# Patient Record
Sex: FEMALE | Race: WHITE | NOT HISPANIC OR LATINO | Employment: OTHER | URBAN - METROPOLITAN AREA
[De-identification: names, ages, dates, MRNs, and addresses within clinical notes are randomized per-mention and may not be internally consistent; named-entity substitution may affect disease eponyms.]

---

## 2023-10-17 ENCOUNTER — TELEPHONE (OUTPATIENT)
Dept: HEMATOLOGY/ONCOLOGY | Facility: CLINIC | Age: 63
End: 2023-10-17
Payer: COMMERCIAL

## 2023-10-17 DIAGNOSIS — K21.9 GASTROESOPHAGEAL REFLUX DISEASE WITHOUT ESOPHAGITIS: Primary | ICD-10-CM

## 2023-10-17 PROBLEM — Z85.3 PERSONAL HISTORY OF BREAST CANCER: Status: ACTIVE | Noted: 2023-10-17

## 2023-10-17 RX ORDER — FAMOTIDINE 20 MG/1
20 TABLET, FILM COATED ORAL 2 TIMES DAILY
Qty: 60 TABLET | Refills: 11 | Status: SHIPPED | OUTPATIENT
Start: 2023-10-17 | End: 2024-10-16

## 2023-10-25 DIAGNOSIS — G47.00 INSOMNIA, UNSPECIFIED TYPE: Primary | ICD-10-CM

## 2023-10-25 RX ORDER — TRAZODONE HYDROCHLORIDE 100 MG/1
100 TABLET ORAL NIGHTLY
Qty: 30 TABLET | Refills: 0 | Status: SHIPPED | OUTPATIENT
Start: 2023-10-25 | End: 2023-11-27

## 2023-11-26 DIAGNOSIS — G47.00 INSOMNIA, UNSPECIFIED TYPE: ICD-10-CM

## 2023-11-27 RX ORDER — TRAZODONE HYDROCHLORIDE 100 MG/1
100 TABLET ORAL NIGHTLY
Qty: 30 TABLET | Refills: 0 | Status: SHIPPED | OUTPATIENT
Start: 2023-11-27 | End: 2024-01-03

## 2023-12-07 ENCOUNTER — HOSPITAL ENCOUNTER (EMERGENCY)
Facility: HOSPITAL | Age: 63
Discharge: HOME | End: 2023-12-07
Attending: STUDENT IN AN ORGANIZED HEALTH CARE EDUCATION/TRAINING PROGRAM
Payer: COMMERCIAL

## 2023-12-07 ENCOUNTER — APPOINTMENT (OUTPATIENT)
Dept: RADIOLOGY | Facility: HOSPITAL | Age: 63
End: 2023-12-07
Payer: COMMERCIAL

## 2023-12-07 VITALS
BODY MASS INDEX: 28.71 KG/M2 | WEIGHT: 156 LBS | HEIGHT: 62 IN | DIASTOLIC BLOOD PRESSURE: 78 MMHG | HEART RATE: 85 BPM | SYSTOLIC BLOOD PRESSURE: 148 MMHG | TEMPERATURE: 98.1 F | RESPIRATION RATE: 18 BRPM | OXYGEN SATURATION: 95 %

## 2023-12-07 DIAGNOSIS — N30.00 ACUTE CYSTITIS WITHOUT HEMATURIA: ICD-10-CM

## 2023-12-07 DIAGNOSIS — R53.83 OTHER FATIGUE: Primary | ICD-10-CM

## 2023-12-07 PROBLEM — Z86.2 PERSONAL HISTORY OF DISEASES OF THE BLOOD AND BLOOD-FORMING ORGANS AND CERTAIN DISORDERS INVOLVING THE IMMUNE MECHANISM: Status: ACTIVE | Noted: 2023-12-07

## 2023-12-07 PROBLEM — E53.8 DEFICIENCY OF OTHER SPECIFIED B GROUP VITAMINS: Status: ACTIVE | Noted: 2023-12-07

## 2023-12-07 PROBLEM — Z86.73 PERSONAL HISTORY OF TRANSIENT ISCHEMIC ATTACK (TIA), AND CEREBRAL INFARCTION WITHOUT RESIDUAL DEFICITS: Status: ACTIVE | Noted: 2023-12-07

## 2023-12-07 PROBLEM — Z86.69 PERSONAL HISTORY OF OTHER DISEASES OF THE NERVOUS SYSTEM AND SENSE ORGANS: Status: ACTIVE | Noted: 2023-12-07

## 2023-12-07 PROBLEM — Z86.79 PERSONAL HISTORY OF OTHER DISEASES OF THE CIRCULATORY SYSTEM: Status: ACTIVE | Noted: 2023-12-07

## 2023-12-07 PROBLEM — Z86.39 PERSONAL HISTORY OF OTHER ENDOCRINE, NUTRITIONAL AND METABOLIC DISEASE: Status: ACTIVE | Noted: 2023-12-07

## 2023-12-07 LAB
ALBUMIN SERPL-MCNC: 3.5 G/DL (ref 3.5–5)
ALP BLD-CCNC: 133 U/L (ref 35–125)
ALT SERPL-CCNC: 19 U/L (ref 5–40)
ANION GAP SERPL CALC-SCNC: 11 MMOL/L
APPEARANCE UR: ABNORMAL
AST SERPL-CCNC: 34 U/L (ref 5–40)
BASOPHILS # BLD AUTO: 0.02 X10*3/UL (ref 0–0.1)
BASOPHILS NFR BLD AUTO: 0.3 %
BILIRUB SERPL-MCNC: 0.5 MG/DL (ref 0.1–1.2)
BILIRUB UR STRIP.AUTO-MCNC: NEGATIVE MG/DL
BUN SERPL-MCNC: 6 MG/DL (ref 8–25)
CALCIUM SERPL-MCNC: 9 MG/DL (ref 8.5–10.4)
CHLORIDE SERPL-SCNC: 95 MMOL/L (ref 97–107)
CO2 SERPL-SCNC: 32 MMOL/L (ref 24–31)
COLOR UR: YELLOW
CREAT SERPL-MCNC: 0.7 MG/DL (ref 0.4–1.6)
EOSINOPHIL # BLD AUTO: 0.09 X10*3/UL (ref 0–0.7)
EOSINOPHIL NFR BLD AUTO: 1.1 %
ERYTHROCYTE [DISTWIDTH] IN BLOOD BY AUTOMATED COUNT: 14.9 % (ref 11.5–14.5)
GFR SERPL CREATININE-BSD FRML MDRD: >90 ML/MIN/1.73M*2
GLUCOSE SERPL-MCNC: 101 MG/DL (ref 65–99)
GLUCOSE UR STRIP.AUTO-MCNC: NORMAL MG/DL
HCT VFR BLD AUTO: 46.6 % (ref 36–46)
HGB BLD-MCNC: 15.3 G/DL (ref 12–16)
HOLD SPECIMEN: NORMAL
HYALINE CASTS #/AREA URNS AUTO: ABNORMAL /LPF
IMM GRANULOCYTES # BLD AUTO: 0.04 X10*3/UL (ref 0–0.7)
IMM GRANULOCYTES NFR BLD AUTO: 0.5 % (ref 0–0.9)
KETONES UR STRIP.AUTO-MCNC: NEGATIVE MG/DL
LEUKOCYTE ESTERASE UR QL STRIP.AUTO: ABNORMAL
LYMPHOCYTES # BLD AUTO: 1.58 X10*3/UL (ref 1.2–4.8)
LYMPHOCYTES NFR BLD AUTO: 19.8 %
MAGNESIUM SERPL-MCNC: 1.9 MG/DL (ref 1.6–3.1)
MCH RBC QN AUTO: 35.5 PG (ref 26–34)
MCHC RBC AUTO-ENTMCNC: 32.8 G/DL (ref 32–36)
MCV RBC AUTO: 108 FL (ref 80–100)
MONOCYTES # BLD AUTO: 0.67 X10*3/UL (ref 0.1–1)
MONOCYTES NFR BLD AUTO: 8.4 %
MUCOUS THREADS #/AREA URNS AUTO: ABNORMAL /LPF
NEUTROPHILS # BLD AUTO: 5.57 X10*3/UL (ref 1.2–7.7)
NEUTROPHILS NFR BLD AUTO: 69.9 %
NITRITE UR QL STRIP.AUTO: NEGATIVE
NRBC BLD-RTO: 0 /100 WBCS (ref 0–0)
PH UR STRIP.AUTO: 8 [PH]
PLATELET # BLD AUTO: 170 X10*3/UL (ref 150–450)
POTASSIUM SERPL-SCNC: 4.2 MMOL/L (ref 3.4–5.1)
PROT SERPL-MCNC: 6.5 G/DL (ref 5.9–7.9)
PROT UR STRIP.AUTO-MCNC: ABNORMAL MG/DL
RBC # BLD AUTO: 4.31 X10*6/UL (ref 4–5.2)
RBC # UR STRIP.AUTO: NEGATIVE /UL
RBC #/AREA URNS AUTO: ABNORMAL /HPF
SODIUM SERPL-SCNC: 138 MMOL/L (ref 133–145)
SP GR UR STRIP.AUTO: 1.02
SQUAMOUS #/AREA URNS AUTO: ABNORMAL /HPF
UROBILINOGEN UR STRIP.AUTO-MCNC: ABNORMAL MG/DL
WBC # BLD AUTO: 8 X10*3/UL (ref 4.4–11.3)
WBC #/AREA URNS AUTO: ABNORMAL /HPF

## 2023-12-07 PROCEDURE — 83735 ASSAY OF MAGNESIUM: CPT

## 2023-12-07 PROCEDURE — 96361 HYDRATE IV INFUSION ADD-ON: CPT

## 2023-12-07 PROCEDURE — 96360 HYDRATION IV INFUSION INIT: CPT

## 2023-12-07 PROCEDURE — 2500000004 HC RX 250 GENERAL PHARMACY W/ HCPCS (ALT 636 FOR OP/ED)

## 2023-12-07 PROCEDURE — 70450 CT HEAD/BRAIN W/O DYE: CPT

## 2023-12-07 PROCEDURE — 84155 ASSAY OF PROTEIN SERUM: CPT

## 2023-12-07 PROCEDURE — 87086 URINE CULTURE/COLONY COUNT: CPT | Mod: WESLAB

## 2023-12-07 PROCEDURE — 81001 URINALYSIS AUTO W/SCOPE: CPT

## 2023-12-07 PROCEDURE — 99284 EMERGENCY DEPT VISIT MOD MDM: CPT | Performed by: STUDENT IN AN ORGANIZED HEALTH CARE EDUCATION/TRAINING PROGRAM

## 2023-12-07 PROCEDURE — 36415 COLL VENOUS BLD VENIPUNCTURE: CPT

## 2023-12-07 PROCEDURE — 71046 X-RAY EXAM CHEST 2 VIEWS: CPT

## 2023-12-07 PROCEDURE — 85025 COMPLETE CBC W/AUTO DIFF WBC: CPT

## 2023-12-07 RX ORDER — NITROFURANTOIN 25; 75 MG/1; MG/1
100 CAPSULE ORAL 2 TIMES DAILY
Qty: 10 CAPSULE | Refills: 0 | Status: SHIPPED | OUTPATIENT
Start: 2023-12-07 | End: 2023-12-12

## 2023-12-07 RX ADMIN — SODIUM CHLORIDE 500 ML: 9 INJECTION, SOLUTION INTRAVENOUS at 19:45

## 2023-12-07 ASSESSMENT — LIFESTYLE VARIABLES
HAVE YOU EVER FELT YOU SHOULD CUT DOWN ON YOUR DRINKING: NO
EVER FELT BAD OR GUILTY ABOUT YOUR DRINKING: NO
EVER HAD A DRINK FIRST THING IN THE MORNING TO STEADY YOUR NERVES TO GET RID OF A HANGOVER: NO
REASON UNABLE TO ASSESS: NO
HAVE PEOPLE ANNOYED YOU BY CRITICIZING YOUR DRINKING: NO

## 2023-12-07 ASSESSMENT — PAIN - FUNCTIONAL ASSESSMENT: PAIN_FUNCTIONAL_ASSESSMENT: 0-10

## 2023-12-07 ASSESSMENT — COLUMBIA-SUICIDE SEVERITY RATING SCALE - C-SSRS
6. HAVE YOU EVER DONE ANYTHING, STARTED TO DO ANYTHING, OR PREPARED TO DO ANYTHING TO END YOUR LIFE?: NO
2. HAVE YOU ACTUALLY HAD ANY THOUGHTS OF KILLING YOURSELF?: NO
1. IN THE PAST MONTH, HAVE YOU WISHED YOU WERE DEAD OR WISHED YOU COULD GO TO SLEEP AND NOT WAKE UP?: NO

## 2023-12-07 ASSESSMENT — PAIN SCALES - GENERAL
PAINLEVEL_OUTOF10: 0 - NO PAIN
PAINLEVEL_OUTOF10: 2

## 2023-12-07 ASSESSMENT — PAIN DESCRIPTION - PROGRESSION: CLINICAL_PROGRESSION: NOT CHANGED

## 2023-12-07 NOTE — ED TRIAGE NOTES
Pt complains of left sided weakness for about a few weeks. Pt has a hx of a stroke with left sided weakness. Pt states she has been feeling more weak lately and has not been able to do things as she normally does.

## 2023-12-08 NOTE — DISCHARGE INSTRUCTIONS
Please follow-up closely with your primary care provider in the following 1 to 2 days.    Take oral antibiotic as prescribed and complete entire course.    Continue Tylenol and ibuprofen as needed for aches and pains.

## 2023-12-08 NOTE — ED PROVIDER NOTES
HPI   Chief Complaint   Patient presents with   • Extremity Weakness       Patient is a 63-year-old female presents emergency department for evaluation of weakness.  Patient states that over the last several weeks she has been having increasing weakness.  She states that she has baseline left-sided deficits that wax and wane since her stroke 2 years ago.  She states some days are better than others, but she denies any difficulty speaking.  She states that she has been able to get up and walk around over the last 3 to 4 weeks without difficulty, but feels that she just feels weak.  She states that some days she feels very numb and other days she feels fine.  She has no specific complaints at this time.  She states that at home she does utilize a walker to get around and does have help at home with her  and brothers who live with her.  She does note history of breast cancer with double mastectomy and chemotherapy in remission over the last year. She denies any chest pain, lightheadedness, dizziness, nausea, vomiting, abdominal pain, shortness of breath, headache, vision changes, difficulty speaking.  She does note that she has a rash on her left foot that has been there for several months which is not painful or itchy.       History provided by:  Patient   used: No                        No data recorded                Patient History   Past Medical History:   Diagnosis Date   • Deficiency of other specified B group vitamins     Folate deficiency   • Personal history of diseases of the blood and blood-forming organs and certain disorders involving the immune mechanism     History of macrocytosis   • Personal history of other diseases of the circulatory system     History of hypertension   • Personal history of other diseases of the nervous system and sense organs     History of peripheral neuropathy   • Personal history of other endocrine, nutritional and metabolic disease     History of  hypokalemia   • Personal history of transient ischemic attack (TIA), and cerebral infarction without residual deficits     History of stroke     Past Surgical History:   Procedure Laterality Date   • BI STEREOTACTIC GUIDED BREAST LEFT LOCALIZATION AND BIOPSY Left 3/28/2022    BI STEREOTACTIC GUIDED BREAST LOCALIZATION AND BIOPSY LEFT [facility]   • BI US GUIDED BREAST LOCALIZATION AND BIOPSY RIGHT Right 3/28/2022    BI US GUIDED BREAST LOCALIZATION AND BIOPSY RIGHT [facility]   • BI US GUIDED BREAST LOCALIZATION RIGHT Right 9/26/2022    BI US GUIDED BREAST LOCALIZATION RIGHT [facility] INPATIENT LEGAC[facility]   • MR HEAD ANGIO WO IV CONTRAST  10/26/2021    MR HEAD ANGIO WO IV CONTRAST [facility] EMERGENCY [facility]GAC[facility]   • OTHER SURGICAL HISTORY  01/04/2022    Cataract surgery   • OTHER SURGICAL HISTORY  01/04/2022    Hysterectomy   • US GUIDED BIOPSY LYMPH NODE SUPERFICIAL  3/28/2022    US GUIDED BIOPSY LYMPH NODE SUPERFICIAL [facility] CL[facility]ICAL [facility]GAC[facility]     No family history on file.  Social History     Tobacco Use   • Smoking status: Not on file   • Smokeless tobacco: Not on file   Substance Use Topics   • Alcohol use: Not on file   • Drug use: Not on file       Physical Exam   ED Triage Vitals [12/07/23 1824]   Temp Heart Rate Resp BP   36.7 °C (98.1 °F) 85 18 148/78      SpO2 Temp src Heart Rate Source Patient Position   95 % -- -- --      BP Location FiO2 (%)     -- --       Physical Exam  Constitutional:       Appearance: Normal appearance.   HENT:      Head: Normocephalic and atraumatic.   Eyes:      Extraocular Movements: Extraocular movements intact.      Pupils: Pupils are equal, round, and reactive to light.   Cardiovascular:      Rate and Rhythm: Normal rate and regular rhythm.   Pulmonary:      Effort: Pulmonary effort is normal.      Breath sounds: Normal breath sounds.   Abdominal:      General: Abdomen is flat.      Palpations: Abdomen is soft.      Tenderness: There is no abdominal tenderness.   Musculoskeletal:          General: Normal range of motion.   Skin:     General: Skin is warm and dry.   Neurological:      General: No focal deficit present.      Mental Status: She is alert and oriented to person, place, and time.      Comments: NIHSS 0.   Psychiatric:         Mood and Affect: Mood normal.         Behavior: Behavior normal.         ED Course & MDM   Diagnoses as of 12/07/23 2231   Other fatigue   Acute cystitis without hematuria       Medical Decision Making  Patient is a 63-year-old female presents emergency department for evaluation of generalized weakness.    EKG was interpreted by attending physician.    Lab work done today included CMP, CBC, magnesium, urinalysis.  Lab work with mild electrolyte disturbances and otherwise unremarkable.  Urine shows some leukocyte esterase and white cells.    Scans done today were interpreted/confirmed by radiologist and also interpreted by me which included CT brain without contrast and chest x-ray.  CT brain shows no acute abnormality of the brain with sinus disease as described.  Chest x-ray shows no acute cardiopulmonary disease.    Medications given at today's visit include IV fluids     I saw this patient in conjunction with Dr. Rubio.  There was initial concern that patient was mildly hypoxic at 89% on room air and was placed on oxygen by nursing staff 2 L.  On evaluation of patient I removed oxygen and was able to observe oxygen saturations for long periods of times which maintain 96 to 97% even while conversing.  She was able to ambulate without difficulty and was not short of breath at any point.  She had no chest pain and otherwise unremarkable workup.  Given that she was able to ambulate with no acute abnormalities noted on imaging today or lab work patient is stable for discharge follow-up closely outpatient with primary care provider.  She has no acute neurologic deficits and no focal deficits concerning for acute or subacute stroke at this time.  Unclear etiology  of patient's generalized weakness, but likely related to gradual general decline rather than acute pathology.  She does have some leukocyte esterase in urine with white blood cells likely contamination, but given weakness and elderly we will treat with Macrobid for possible developing urinary tract infection.  Given that she is able to ambulate she will be discharged to follow close with primary care provider for referral for physical therapy and continued management of her symptoms at home.  She was given close return precautions should she develop any new or worsening symptoms.  Emergent pathologies were considered for this patient, although I have low suspicion for anything acutely emergent given patient's clinical presentation, history, physical exam, stable vital signs, and relatively unremarkable workup.  Discharging patient home is reasonable plan of care for outpatient management.    All labs, imaging, and diagnostic studies were reviewed by me and patient was counseled on clinical impression, expectations, and plan.  Patient was educated to follow-up with PCP in the following 1-2 days.  All questions from patient were answered. They elicited understanding and were agreeable to course of treatment.  Patient was discharged in stable condition and given strict return precautions.    ** Disclaimer:  Parts of this document were written utilizing a voice to text dictation software.  Note may contain minor transcription or typographical errors that were inadvertently transcribed by the computer software.        Procedure  Procedures     Gina Leonard PA-C  12/07/23 5603

## 2023-12-09 LAB — BACTERIA UR CULT: NORMAL

## 2023-12-11 ENCOUNTER — TELEPHONE (OUTPATIENT)
Dept: PRIMARY CARE | Facility: CLINIC | Age: 63
End: 2023-12-11
Payer: COMMERCIAL

## 2023-12-25 NOTE — PROGRESS NOTES
This is a 63 year old female for ER FU visit for WEAKNESS and HYPOXIA and while CXR in ER WNL there is a Hx of May 2023 ABN on CT CHEST in [facility] and needs 3 month FU CT as copied below:      IMPRESSION:  1. Several 2-3 mm nodular foci along the anti dependent, anterior aspect of  the upper trachea. These could indicate adherent secretions, but  endobronchial nodules not excluded. Therefore, follow-up chest CT in 3  months is recommended with instruction for vigorous expectoration prior to  the scan, to clear any secretions.     2. Additional new 2-3 mm pulmonary nodules are identified, as detailed  above. Follow-up chest CT in 12 months will also be necessary.     3. Incidentally noted skin thickening in the region of the left breast,  similarly to the most recent CT. Please correlate with physical examination.     RECOMMENDATIONS: Follow-up low-dose chest CT in 3 months with instruction  for vigorous expectoration prior to the scan, to clear any secretions and  evaluate for endobronchial nodules.     Category: Lung-RADS Category 4A --  Suspicious findings for which additional  diagnostic testing is recommended.  3 month LDCT recommended or PET/CT may  be used if nodule has >= 8mm solid component.    __________________________________________________________________________________________________________    COPY of ER notes:    I saw this patient in conjunction with Dr. Rubio.  There was initial concern that patient was mildly hypoxic at 89% on room air and was placed on oxygen by nursing staff 2 L.  On evaluation of patient I removed oxygen and was able to observe oxygen saturations for long periods of times which maintain 96 to 97% even while conversing.  She was able to ambulate without difficulty and was not short of breath at any point.  She had no chest pain and otherwise unremarkable workup.  Given that she was able to ambulate with no acute abnormalities noted on imaging today or lab work patient  is stable for discharge follow-up closely outpatient with primary care provider.  She has no acute neurologic deficits and no focal deficits concerning for acute or subacute stroke at this time.  Unclear etiology of patient's generalized weakness, but likely related to gradual general decline rather than acute pathology.  She does have some leukocyte esterase in urine with white blood cells likely contamination, but given weakness and elderly we will treat with Macrobid for possible developing urinary tract infection.  Given that she is able to ambulate she will be discharged to follow close with primary care provider for referral for physical therapy and continued management of her symptoms at home.  She was given close return precautions should she develop any new or worsening symptoms.  Emergent pathologies were considered for this patient, although I have low suspicion for anything acutely emergent given patient's clinical presentation, history, physical exam, stable vital signs, and relatively unremarkable workup.  Discharging patient home is reasonable plan of care for outpatient management.     All labs, imaging, and diagnostic studies were reviewed by me and patient was counseled on clinical impression, expectations, and plan.  Patient was educated to follow-up with PCP in the following 1-2 days.  All questions from patient were answered. They elicited understanding and were agreeable to course of treatment.  Patient was discharged in stable condition and given strict return precautions.    ROS is NEG for HEADACHE, NAUSEA, VOMITING, DIARRHEA, CHEST PAIN, SOB, and BLEEDING and as further REVIEWED BELOW.      Subjective   Julia Carrion is a 63 y.o. female who presents for ER FU VISIT  .    HPI:        Review of systems is essentially negative for all systems except for any identified issues in HPI above.    Objective     There were no vitals taken for this visit.     Physical Examination:       GENERAL            General Appearance: well-appearing, well-developed, well-hydrated, well-nourished, no acute distress.        HEENT           NECK supple, no masses or thyromegaly, no carotid bruit.        EYES           Extraocular Movements: normal, bilateral eyes TRISH, no conjunctival injection.        HEART           Rate and Rhythm regular rate and rhythm. Heart sounds: normal S1S2, no S3 or S4. Murmurs: none.        CHEST           Breath sounds: Clear to IPPA, RR<16 no use of accessory muscles.        ABDOMEN           General: Neg for LKKS or masses, no scleral icterus or jaundice.        MUSCULOSKELETAL           Joints Demonstration: Neg for erythema, swelling or joint deformities. gross abnormalities no gross abnormalities.        EXTREMITIES           Lower Extremities: Neg for cyanosis, clubbing or edema.       Assessment/Plan   Problem List Items Addressed This Visit    None      FOLLOW UP:  PRN and as specified above         Carolann Abrams M.D.

## 2024-01-02 DIAGNOSIS — G47.00 INSOMNIA, UNSPECIFIED TYPE: ICD-10-CM

## 2024-01-03 RX ORDER — TRAZODONE HYDROCHLORIDE 100 MG/1
100 TABLET ORAL NIGHTLY
Qty: 30 TABLET | Refills: 0 | Status: SHIPPED | OUTPATIENT
Start: 2024-01-03 | End: 2024-02-02

## 2024-01-04 ENCOUNTER — APPOINTMENT (OUTPATIENT)
Dept: PRIMARY CARE | Facility: CLINIC | Age: 64
End: 2024-01-04
Payer: COMMERCIAL

## 2024-01-09 DIAGNOSIS — Z76.0 PRESCRIPTION REFILL: ICD-10-CM

## 2024-01-09 PROBLEM — C50.911 MALIGNANT NEOPLASM OF RIGHT BREAST IN FEMALE, ESTROGEN RECEPTOR POSITIVE (MULTI): Status: ACTIVE | Noted: 2024-01-09

## 2024-01-09 PROBLEM — Z17.0 MALIGNANT NEOPLASM OF RIGHT BREAST IN FEMALE, ESTROGEN RECEPTOR POSITIVE (MULTI): Status: ACTIVE | Noted: 2024-01-09

## 2024-01-09 NOTE — PROGRESS NOTES
"Patient ID: Julia Carrion is a 63 y.o. female.    The patient presents to clinic today for her history of breast cancer.     Cancer Staging   Malignant neoplasm of right breast in female, estrogen receptor positive (CMS/HCC)  Staging form: Breast, AJCC 8th Edition  - Pathologic stage from 10/17/2022: No Stage Recommended (ypT2, pN2, cM0) - Unsigned        Diagnostic/Therapeutic History:  She felt a change in her right breast for at least 1 year.   BILATERAL BREAST CANCER     RIGHT  ER DE positive and HER2-negative.  Low genomic risk but high clinical risk.     MAMMAPRINT SUMMARY OF RESULTS:  LOW RISK LUMINAL-TYPE (A)                                 MAMMAPRINT INDEX:  +0.092                                                                                        Probability of pCR: 2%                                                                                                        Some delay in care related to CVA 10/2021.  Changes in the skin noted and at 2 areas of disease noted.  Clinically stage T3 N1.     Left Breast - DCIS      S/P 9/26/22 bilateral mastectomy--- yT4b N2 s/p 4 months of  neoadjuvant AI/palbociclib.  10/17 right ax dissection with 1/7 nodes involved.  - Attempted adjuvant therapy again with CDk4-6 inhib with very poor tolerance of Abemaciclib with transition to Palbociclib. This was also not tolerated.  - Maintained on anastrozole monotherapy.     Family History:  Sister with breast cancer in her 50's.    History of Present Illness (HPI)/Interval History:  Ms. Carrion presents for presents today for follow-up, treatment and surveillance. She is compliant on anastrozole. She resents today with abdominal pain. Primarily in the RUQ x 5-6 days. Described as sharp \"like a knife stabbing me\" and comes and goes but when present difficult to move and bend also worse with deep breathing. She recently stopping drinking hard liquor ~ 2 weeks ago. She went through withdrawals with nausea and " vomiting. Nausea now comes and goes. She did not get any relief with aleve. She noted that her diarrhea improved since      Her lymphedema has improved some. No breast cancer concerns.      She denies any chest pain or breathing issues.     She denies any vision changes or headache issues, dizziness. Uses walker for stability since stroke. Generalized weakness, went to ED for this 12/7 without acute pathology.      She has baseline pains. Nothing new.      She denies any skin lesions or masses.     She denies any issues with sleep, hot flashes or mood swings.      Continues to smoke a pack and a half a day. Recently quit drinking hard liquor.      Review of Systems:  14-point ROS otherwise negative, as per HPI.    Past Medical History:   Diagnosis Date    Deficiency of other specified B group vitamins     Folate deficiency    Personal history of diseases of the blood and blood-forming organs and certain disorders involving the immune mechanism     History of macrocytosis    Personal history of other diseases of the circulatory system     History of hypertension    Personal history of other diseases of the nervous system and sense organs     History of peripheral neuropathy    Personal history of other endocrine, nutritional and metabolic disease     History of hypokalemia    Personal history of transient ischemic attack (TIA), and cerebral infarction without residual deficits     History of stroke       Past Surgical History:   Procedure Laterality Date    BI STEREOTACTIC GUIDED BREAST LEFT LOCALIZATION AND BIOPSY Left 3/28/2022    BI STEREOTACTIC GUIDED BREAST LOCALIZATION AND BIOPSY LEFT [facility]    BI US GUIDED BREAST LOCALIZATION AND BIOPSY RIGHT Right 3/28/2022    BI US GUIDED BREAST LOCALIZATION AND BIOPSY RIGHT [facility] CL[facility]ICAL [facility]GAC[facility]    BI US GUIDED BREAST LOCALIZATION RIGHT Right 9/26/2022    BI US GUIDED BREAST LOCALIZATION RIGHT [facility] INPATIENT LEGACY    MR HEAD ANGIO WO IV CONTRAST  10/26/2021    MR  HEAD ANGIO WO IV CONTRAST [facility]    OTHER SURGICAL HISTORY  01/04/2022    Cataract surgery    OTHER SURGICAL HISTORY  01/04/2022    Hysterectomy    US GUIDED BIOPSY LYMPH NODE SUPERFICIAL  3/28/2022    US GUIDED BIOPSY LYMPH NODE SUPERFICIAL [facility]       Social History     Socioeconomic History    Marital status:      Spouse name: None    Number of children: None    Years of education: None    Highest education level: None   Occupational History    None   Tobacco Use    Smoking status: Every Day     Packs/day: 1.5     Types: Cigarettes     Passive exposure: Past    Smokeless tobacco: Never   Vaping Use    Vaping Use: Never used   Substance and Sexual Activity    Alcohol use: Yes     Alcohol/week: 8.0 standard drinks of alcohol     Types: 8 Cans of beer per week    Drug use: Never    Sexual activity: None   Other Topics Concern    None   Social History Narrative    None     Social Determinants of Health     Financial Resource Strain: Not on file   Food Insecurity: Not on file   Transportation Needs: Not on file   Physical Activity: Not on file   Stress: Not on file   Social Connections: Not on file   Intimate Partner Violence: Not on file   Housing Stability: Not on file       No Known Allergies      Current Outpatient Medications:     anastrozole (Arimidex) 1 mg tablet, Take 1 tablet (1 mg total) by mouth once daily.  Swallow whole with a drink of water., Disp: 30 tablet, Rfl: 11    atorvastatin (Lipitor) 40 mg tablet, Take 1 tablet by mouth once daily for 90 days, Disp: 90 tablet, Rfl: 0    famotidine (Pepcid) 20 mg tablet, Take 1 tablet (20 mg) by mouth 2 times a day., Disp: 60 tablet, Rfl: 11    traZODone (Desyrel) 100 mg tablet, TAKE 1 TABLET BY MOUTH ONCE DAILY AT BEDTIME, Disp: 30 tablet, Rfl: 0     Objective    BSA: 1.7 meters squared  /83 (BP Location: Right arm, Patient Position: Sitting, BP Cuff Size: Adult long)   Pulse 95   Temp 36.1 °C (97 °F) (Temporal)   Resp  18   Wt 66.2 kg (145 lb 13.4 oz)   SpO2 97%   BMI 26.67 kg/m²     Performance Status:  The ECOG performance scale today is ECO- Restricted in physically strenuous activity.  Carries out light duty.    Physical Exam  Vitals reviewed.   Constitutional:       General: She is awake. She is not in acute distress.     Appearance: Normal appearance. She is not ill-appearing.   HENT:      Mouth/Throat:      Pharynx: Oropharynx is clear. No oropharyngeal exudate.   Eyes:      General: No scleral icterus.     Conjunctiva/sclera: Conjunctivae normal.   Neck:      Trachea: Trachea and phonation normal. No tracheal tenderness.   Cardiovascular:      Rate and Rhythm: Normal rate and regular rhythm.      Heart sounds: No murmur heard.     No friction rub. No gallop.   Pulmonary:      Effort: Pulmonary effort is normal. No respiratory distress.      Breath sounds: Normal breath sounds. No stridor. No wheezing, rhonchi or rales.   Chest:      Chest wall: No mass, lacerations, deformity or tenderness.   Breasts:     Right: Absent.      Left: Absent.      Comments: S/p bilateral mastectomies   Abdominal:      General: Abdomen is flat. There is no distension.      Palpations: Abdomen is soft. There is no mass.      Tenderness: There is abdominal tenderness (RUQ). There is guarding (RUQ).   Lymphadenopathy:      Cervical: No cervical adenopathy.      Upper Body:      Right upper body: No supraclavicular, axillary or pectoral adenopathy.      Left upper body: No supraclavicular, axillary or pectoral adenopathy.   Skin:     General: Skin is warm and dry.      Coloration: Skin is not jaundiced.      Findings: No lesion or rash.   Neurological:      General: No focal deficit present.      Mental Status: She is alert and oriented to person, place, and time.      Motor: No weakness.      Gait: Gait normal.   Psychiatric:         Mood and Affect: Mood normal.         Thought Content: Thought content normal.         Judgment: Judgment  normal.         Laboratory Data:  Lab Results   Component Value Date    WBC 8.0 12/07/2023    HGB 15.3 12/07/2023    HCT 46.6 (H) 12/07/2023     (H) 12/07/2023     12/07/2023    ANC 2.20 08/08/2022       Chemistry    Lab Results   Component Value Date/Time     12/07/2023 1949    K 4.2 12/07/2023 1949    CL 95 (L) 12/07/2023 1949    CO2 32 (H) 12/07/2023 1949    BUN 6 (L) 12/07/2023 1949    CREATININE 0.70 12/07/2023 1949    Lab Results   Component Value Date/Time    CALCIUM 9.0 12/07/2023 1949    ALKPHOS 133 (H) 12/07/2023 1949    AST 34 12/07/2023 1949    ALT 19 12/07/2023 1949    BILITOT 0.5 12/07/2023 1949             Radiology:  CT head wo IV contrast  Narrative: Interpreted By:  Naeem Dewey,   STUDY:  CT HEAD WO IV CONTRAST; 12/7/2023 7:56 pm      INDICATION:  Signs/Symptoms: weakness.      COMPARISON:  None.      ACCESSION NUMBER(S):  [id number]      ORDERING CLINICIAN:  JOSE SHIPMAN      TECHNIQUE:  CT was performed with one or more of the following dose reduction  techniques: automated exposure control, adjustment of the mA and/or  kV according to patient size, or use of iterative reconstruction  technique.              PROCEDURE: 3.0 mm axial images were obtained through the brain, to  include the posterior fossa without intravenous contrast enhancement.      FINDINGS:  There is symmetric volume loss of the bilateral frontal lobes more  than the remainder of the cerebral hemispheres. Minimal decreased  attenuation in the bilateral periventricular white matter, consistent  with microangiopathy is noted.  There is no encephalomalacic change.  Brainstem is unremarkable. Cerebellar hemispheres are symmetric. No  intra- or extra-axial mass or abnormal blood products are  demonstrated.      Mild ethmoid mucosal thickening is seen with posterior right  maxillary mucosal thickening. The remainder of the paranasal sinuses  are unremarkable.      Impression: 1. No acute abnormality of the  brain.  2. Sinus disease as described.          This report has been produced using speech recognition.  This exam is available in DICOM format to non-affiliated healthcare  facilities on a secure media free searchable basis with prior patient  authorization. The patient exposure is reported to a radiation dose  index registry. All CT examinations are performed with one or more of  the following dose reduction techniques: Automated Exposure Control,  Adjustment of mA and/or KV according to patient size, or use of  iterative reconstruction techniques.      MACRO:  None      Signed by: Naeem Dewey 12/7/2023 8:26 PM  Dictation workstation:   [id number]  XR chest 2 views  Narrative: Interpreted By:  Naeem Dewey,   STUDY:  XR CHEST 2 VIEWS; 12/7/2023 8:00 pm      INDICATION:  Signs/Symptoms:weakness.      COMPARISON:  24 October 2021      ACCESSION NUMBER(S):  [id number]      ORDERING CLINICIAN:  JOSE SHIPMAN      TECHNIQUE:  AP erect and lateral views of the chest were performed.      FINDINGS:  The lungs are adequately inflated without air space disease or  effusion.  The heart and mediastinal structures are within normal  limits. There are no hilar, vascular, or pleural abnormalities.      Impression: No acute cardiopulmonary disease.      Signed by: Naeem Dewey 12/7/2023 8:16 PM  Dictation workstation:   [id number]       No results found for this or any previous visit from the past 365 days.          Assessment/Plan:    Julia Carrion is a 63 y.o. female with a history of right breast cancer, who presents today follow-up evaluation.    Locally advanced RIGHT breast cancer + Left DCIS.  Not deemed to be a candidate for chemotherapy. CDK4/6 inhibitor poorly tolerated in adjuvant setting.  - Continue anastrozole 1 mg daily, refill sent   - Continue with adj zometa 3/6/24 dose #2     Abdominal pain   Ddx: cholecystitis, acute hepatic pathology, cirrhosis (given drinking hx), other   Discussed with pt and her  spouse that patient should be evaluated in the ED. Imaging of the abdomen is likely needed and given my concern for cholecystitis she would likely be sent to the ED even if imaging showed acute pathology. Pt was very hesitant to go but  was understanding of further evaluation. Pt is HDS and afebrile. No need for EMS transport. Pt encouraged to call with any updates or questions.   - in Dec 2023 did have elevated alk phos of 133     Elevated Hemoglobin   Smoking history of > 40 pack years   Hgb on 8/31/23: 18.1 > 15.3   - Normalized in dec 2023 labs      Borderline osteopenia. Noted on baseline DEXA from 4/2023.  - Will initiate bisphosphonate therapy for breast cancer, as above.  - Zometa #2 on 3/6  - Ca/VitD supplementation recommended.     RUE lymphedema. Improved     H/o stroke with residual left-sided deficits.  - Follows with Neurology. On atorvastatin, aspirin, Plavix.      - RTC in ~ 4 months   - Zometa q6 month, next due 3/6/2024 #2  - No further CDK4/6 inhibitor to be given.  - Encouraged to call with concerns/questions in the interim.       There is no evidence of breast cancer recurrence based on her clinical exam today.      Assess/Plan SmartLinks:   Problem List Items Addressed This Visit             ICD-10-CM    Malignant neoplasm of right breast in female, estrogen receptor positive (CMS/HCC) - Primary C50.911, Z17.0    Relevant Medications    anastrozole (Arimidex) 1 mg tablet    Other Relevant Orders    Clinic Appointment Request DONAVON DORSEY; [id number]       Disposition.  - RTC 4 months with Donvaon Dorsey PA-C / ED recommended   - She was given our contact information and instructed to call with concerns/questions in the interim.    No orders of the defined types were placed in this encounter.            Donavon Dorsey PA-C  Hematology and Medical Oncology  [facility]

## 2024-01-10 ENCOUNTER — OFFICE VISIT (OUTPATIENT)
Dept: HEMATOLOGY/ONCOLOGY | Facility: CLINIC | Age: 64
End: 2024-01-10
Payer: COMMERCIAL

## 2024-01-10 VITALS
RESPIRATION RATE: 18 BRPM | BODY MASS INDEX: 26.67 KG/M2 | SYSTOLIC BLOOD PRESSURE: 134 MMHG | WEIGHT: 145.83 LBS | HEART RATE: 95 BPM | TEMPERATURE: 97 F | OXYGEN SATURATION: 97 % | DIASTOLIC BLOOD PRESSURE: 83 MMHG

## 2024-01-10 DIAGNOSIS — Z17.0 MALIGNANT NEOPLASM OF RIGHT BREAST IN FEMALE, ESTROGEN RECEPTOR POSITIVE, UNSPECIFIED SITE OF BREAST (MULTI): Primary | ICD-10-CM

## 2024-01-10 DIAGNOSIS — C50.911 MALIGNANT NEOPLASM OF RIGHT BREAST IN FEMALE, ESTROGEN RECEPTOR POSITIVE, UNSPECIFIED SITE OF BREAST (MULTI): Primary | ICD-10-CM

## 2024-01-10 PROCEDURE — 99215 OFFICE O/P EST HI 40 MIN: CPT

## 2024-01-10 RX ORDER — EPINEPHRINE 0.3 MG/.3ML
0.3 INJECTION SUBCUTANEOUS EVERY 5 MIN PRN
Status: CANCELLED | OUTPATIENT
Start: 2024-03-06

## 2024-01-10 RX ORDER — ANASTROZOLE 1 MG/1
1 TABLET ORAL DAILY
Qty: 30 TABLET | Refills: 11 | Status: SHIPPED | OUTPATIENT
Start: 2024-01-10

## 2024-01-10 RX ORDER — ALBUTEROL SULFATE 0.83 MG/ML
3 SOLUTION RESPIRATORY (INHALATION) AS NEEDED
Status: CANCELLED | OUTPATIENT
Start: 2024-03-06

## 2024-01-10 RX ORDER — FAMOTIDINE 10 MG/ML
20 INJECTION INTRAVENOUS ONCE AS NEEDED
Status: CANCELLED | OUTPATIENT
Start: 2024-03-06

## 2024-01-10 RX ORDER — ATORVASTATIN CALCIUM 40 MG/1
40 TABLET, FILM COATED ORAL DAILY
Qty: 90 TABLET | Refills: 0 | Status: SHIPPED | OUTPATIENT
Start: 2024-01-10 | End: 2024-04-12 | Stop reason: SDUPTHER

## 2024-01-10 RX ORDER — DIPHENHYDRAMINE HYDROCHLORIDE 50 MG/ML
50 INJECTION INTRAMUSCULAR; INTRAVENOUS AS NEEDED
Status: CANCELLED | OUTPATIENT
Start: 2024-03-06

## 2024-01-10 ASSESSMENT — COLUMBIA-SUICIDE SEVERITY RATING SCALE - C-SSRS
6. HAVE YOU EVER DONE ANYTHING, STARTED TO DO ANYTHING, OR PREPARED TO DO ANYTHING TO END YOUR LIFE?: NO
1. IN THE PAST MONTH, HAVE YOU WISHED YOU WERE DEAD OR WISHED YOU COULD GO TO SLEEP AND NOT WAKE UP?: NO
2. HAVE YOU ACTUALLY HAD ANY THOUGHTS OF KILLING YOURSELF?: NO

## 2024-01-10 ASSESSMENT — PAIN SCALES - GENERAL: PAINLEVEL: 7

## 2024-01-10 ASSESSMENT — PATIENT HEALTH QUESTIONNAIRE - PHQ9
10. IF YOU CHECKED OFF ANY PROBLEMS, HOW DIFFICULT HAVE THESE PROBLEMS MADE IT FOR YOU TO DO YOUR WORK, TAKE CARE OF THINGS AT HOME, OR GET ALONG WITH OTHER PEOPLE: NOT DIFFICULT AT ALL
2. FEELING DOWN, DEPRESSED OR HOPELESS: SEVERAL DAYS
1. LITTLE INTEREST OR PLEASURE IN DOING THINGS: NOT AT ALL
SUM OF ALL RESPONSES TO PHQ9 QUESTIONS 1 AND 2: 1

## 2024-01-10 ASSESSMENT — ENCOUNTER SYMPTOMS
DEPRESSION: 0
LOSS OF SENSATION IN FEET: 1
OCCASIONAL FEELINGS OF UNSTEADINESS: 1

## 2024-01-11 ENCOUNTER — TELEPHONE (OUTPATIENT)
Dept: HEMATOLOGY/ONCOLOGY | Facility: CLINIC | Age: 64
End: 2024-01-11
Payer: COMMERCIAL

## 2024-01-11 NOTE — TELEPHONE ENCOUNTER
I related Marimar Hutson's message to Julia and she stated understanding.  She did not commit to going to the ER or her PCP but I instructed her to do so.  Marimar Hutson notified.

## 2024-02-02 DIAGNOSIS — G47.00 INSOMNIA, UNSPECIFIED TYPE: ICD-10-CM

## 2024-02-02 RX ORDER — TRAZODONE HYDROCHLORIDE 100 MG/1
100 TABLET ORAL NIGHTLY
Qty: 30 TABLET | Refills: 0 | Status: SHIPPED | OUTPATIENT
Start: 2024-02-02 | End: 2024-03-04

## 2024-03-03 DIAGNOSIS — G47.00 INSOMNIA, UNSPECIFIED TYPE: ICD-10-CM

## 2024-03-04 RX ORDER — TRAZODONE HYDROCHLORIDE 100 MG/1
100 TABLET ORAL NIGHTLY
Qty: 30 TABLET | Refills: 0 | Status: SHIPPED | OUTPATIENT
Start: 2024-03-04 | End: 2024-04-01

## 2024-03-05 ENCOUNTER — TELEPHONE (OUTPATIENT)
Dept: HEMATOLOGY/ONCOLOGY | Facility: CLINIC | Age: 64
End: 2024-03-05
Payer: COMMERCIAL

## 2024-03-06 ENCOUNTER — APPOINTMENT (OUTPATIENT)
Dept: HEMATOLOGY/ONCOLOGY | Facility: CLINIC | Age: 64
End: 2024-03-06
Payer: COMMERCIAL

## 2024-03-12 ENCOUNTER — APPOINTMENT (OUTPATIENT)
Dept: HEMATOLOGY/ONCOLOGY | Facility: CLINIC | Age: 64
End: 2024-03-12
Payer: COMMERCIAL

## 2024-04-01 DIAGNOSIS — G47.00 INSOMNIA, UNSPECIFIED TYPE: ICD-10-CM

## 2024-04-01 RX ORDER — TRAZODONE HYDROCHLORIDE 100 MG/1
100 TABLET ORAL NIGHTLY
Qty: 30 TABLET | Refills: 0 | Status: SHIPPED | OUTPATIENT
Start: 2024-04-01 | End: 2024-05-15 | Stop reason: SDUPTHER

## 2024-04-02 ENCOUNTER — TELEPHONE (OUTPATIENT)
Dept: HEMATOLOGY/ONCOLOGY | Facility: CLINIC | Age: 64
End: 2024-04-02
Payer: COMMERCIAL

## 2024-04-02 DIAGNOSIS — C50.911 MALIGNANT NEOPLASM OF RIGHT BREAST IN FEMALE, ESTROGEN RECEPTOR POSITIVE, UNSPECIFIED SITE OF BREAST (MULTI): Primary | ICD-10-CM

## 2024-04-02 DIAGNOSIS — Z17.0 MALIGNANT NEOPLASM OF RIGHT BREAST IN FEMALE, ESTROGEN RECEPTOR POSITIVE, UNSPECIFIED SITE OF BREAST (MULTI): Primary | ICD-10-CM

## 2024-04-12 DIAGNOSIS — Z76.0 PRESCRIPTION REFILL: ICD-10-CM

## 2024-04-12 RX ORDER — ATORVASTATIN CALCIUM 40 MG/1
40 TABLET, FILM COATED ORAL DAILY
Qty: 90 TABLET | Refills: 1 | Status: SHIPPED | OUTPATIENT
Start: 2024-04-12 | End: 2024-06-04 | Stop reason: SDUPTHER

## 2024-05-13 RX ORDER — HEPARIN 100 UNIT/ML
500 SYRINGE INTRAVENOUS AS NEEDED
OUTPATIENT
Start: 2024-05-15

## 2024-05-13 RX ORDER — HEPARIN SODIUM,PORCINE/PF 10 UNIT/ML
50 SYRINGE (ML) INTRAVENOUS AS NEEDED
OUTPATIENT
Start: 2024-05-15

## 2024-05-13 NOTE — PROGRESS NOTES
Patient ID: Julia Carrion is a 64 y.o. female.    The patient presents to clinic today for her history of breast cancer.     Cancer Staging   Malignant neoplasm of right breast in female, estrogen receptor positive (Multi)  Staging form: Breast, AJCC 8th Edition  - Pathologic stage from 10/17/2022: No Stage Recommended (ypT2, pN2, cM0) - Unsigned        Diagnostic/Therapeutic History:  She felt a change in her right breast for at least 1 year.   BILATERAL BREAST CANCER     RIGHT  ER TX positive and HER2-negative.  Low genomic risk but high clinical risk.     MAMMAPRINT SUMMARY OF RESULTS:  LOW RISK LUMINAL-TYPE (A)                                 MAMMAPRINT INDEX:  +0.092                                                                                        Probability of pCR: 2%                                                                                                        Some delay in care related to CVA 10/2021.  Changes in the skin noted and at 2 areas of disease noted.  Clinically stage T3 N1.     Left Breast - DCIS      S/P 9/26/22 bilateral mastectomy--- yT4b N2 s/p 4 months of  neoadjuvant AI/palbociclib.  10/17 right ax dissection with 1/7 nodes involved.  - Attempted adjuvant therapy again with CDk4-6 inhib with very poor tolerance of Abemaciclib with transition to Palbociclib. This was also not tolerated.  - Maintained on anastrozole monotherapy.     Family History:  Sister with breast cancer in her 50's.    History of Present Illness (HPI)/Interval History:  Ms. Carrion presents for presents today for follow-up, treatment and surveillance. She is compliant on anastrozole.      No breast cancer concerns.      She denies any chest pain or breathing issues.     She denies any vision changes or headache issues, dizziness. Uses walker for stability since stroke.      She has baseline arthritic pain, no new pain.      She denies any skin lesions or masses.    She noted that her abdominal pain  resolved after our visit, never evaluated in ED. However for the last week has had a stabbing pain in abdomin. Started in the middle epigastric region and now more located in the left side; which is opposite of her last visit. Noted some intermittent nausea no vomiting, no diarrhea or constipation. Take pepcid for GERD but this feels different. Noted that movement makes it worse. Slightly better than last week but still annoying.      She admits sleep has been a bit worse since being out of trazodone. PCP did refill in April but was never picked up. Denies hot flashes or mood swings.      Continues to smoke a pack and a half a day. No thoughts of quitting. Continues to be off hard liquor.      Review of Systems:  14-point ROS otherwise negative, as per HPI.    Past Medical History:   Diagnosis Date    Deficiency of other specified B group vitamins     Folate deficiency    Personal history of diseases of the blood and blood-forming organs and certain disorders involving the immune mechanism     History of macrocytosis    Personal history of other diseases of the circulatory system     History of hypertension    Personal history of other diseases of the nervous system and sense organs     History of peripheral neuropathy    Personal history of other endocrine, nutritional and metabolic disease     History of hypokalemia    Personal history of transient ischemic attack (TIA), and cerebral infarction without residual deficits     History of stroke       Past Surgical History:   Procedure Laterality Date    BI STEREOTACTIC GUIDED BREAST LEFT LOCALIZATION AND BIOPSY Left 3/28/2022    BI STEREOTACTIC GUIDED BREAST LOCALIZATION AND BIOPSY LEFT [facility]    BI US GUIDED BREAST LOCALIZATION AND BIOPSY RIGHT Right 3/28/2022    BI US GUIDED BREAST LOCALIZATION AND BIOPSY RIGHT [facility]    BI US GUIDED BREAST LOCALIZATION RIGHT Right 9/26/2022    BI US GUIDED BREAST LOCALIZATION RIGHT [facility]     MR HEAD ANGIO WO IV CONTRAST  10/26/2021    MR HEAD ANGIO WO IV CONTRAST [facility]    OTHER SURGICAL HISTORY  01/04/2022    Cataract surgery    OTHER SURGICAL HISTORY  01/04/2022    Hysterectomy    US GUIDED BIOPSY LYMPH NODE SUPERFICIAL  3/28/2022    US GUIDED BIOPSY LYMPH NODE SUPERFICIAL [facility]       Social History     Socioeconomic History    Marital status:      Spouse name: Not on file    Number of children: Not on file    Years of education: Not on file    Highest education level: Not on file   Occupational History    Not on file   Tobacco Use    Smoking status: Every Day     Current packs/day: 1.50     Types: Cigarettes     Passive exposure: Past    Smokeless tobacco: Never   Vaping Use    Vaping status: Never Used   Substance and Sexual Activity    Alcohol use: Yes     Alcohol/week: 8.0 standard drinks of alcohol     Types: 8 Cans of beer per week    Drug use: Never    Sexual activity: Not on file   Other Topics Concern    Not on file   Social History Narrative    Not on file     Social Determinants of Health     Financial Resource Strain: Not on file   Food Insecurity: Not on file   Transportation Needs: Not on file   Physical Activity: Not on file   Stress: Not on file   Social Connections: Not on file   Intimate Partner Violence: Not on file   Housing Stability: Not on file       No Known Allergies      Current Outpatient Medications:     anastrozole (Arimidex) 1 mg tablet, Take 1 tablet (1 mg total) by mouth once daily.  Swallow whole with a drink of water., Disp: 30 tablet, Rfl: 11    atorvastatin (Lipitor) 40 mg tablet, Take 1 tablet (40 mg) by mouth once daily., Disp: 90 tablet, Rfl: 1    famotidine (Pepcid) 20 mg tablet, Take 1 tablet (20 mg) by mouth 2 times a day., Disp: 60 tablet, Rfl: 11    traZODone (Desyrel) 100 mg tablet, Take 1 tablet (100 mg) by mouth once daily at bedtime., Disp: 30 tablet, Rfl: 0     Objective    BSA: 1.71 meters squared  /82 (BP  "Location: Right arm, Patient Position: Sitting, BP Cuff Size: Adult long)   Pulse 94   Temp 36.4 °C (97.5 °F) (Temporal)   Resp 18   Ht (S) 1.565 m (5' 1.61\")   Wt 67 kg (147 lb 11.3 oz)   SpO2 94%   BMI 27.36 kg/m²     Performance Status:  The ECOG performance scale today is ECO- Restricted in physically strenuous activity.  Carries out light duty.    Physical Exam  Vitals reviewed.   Constitutional:       General: She is awake. She is not in acute distress.     Appearance: Normal appearance. She is not ill-appearing.   HENT:      Mouth/Throat:      Pharynx: Oropharynx is clear. No oropharyngeal exudate.   Eyes:      General: No scleral icterus.     Conjunctiva/sclera: Conjunctivae normal.   Neck:      Trachea: Trachea and phonation normal. No tracheal tenderness.   Cardiovascular:      Rate and Rhythm: Normal rate and regular rhythm.      Heart sounds: No murmur heard.     No friction rub. No gallop.   Pulmonary:      Effort: Pulmonary effort is normal. No respiratory distress.      Breath sounds: Normal breath sounds. No stridor. No wheezing, rhonchi or rales.   Chest:      Chest wall: No mass, lacerations, deformity or tenderness.   Breasts:     Right: Absent.      Left: Absent.      Comments: S/p bilateral mastectomies   Abdominal:      General: Abdomen is flat. There is no distension.      Palpations: Abdomen is soft. There is no mass.      Tenderness: There is abdominal tenderness (Epigastric and LUQ). There is guarding (LUQ).   Lymphadenopathy:      Cervical: No cervical adenopathy.      Upper Body:      Right upper body: No supraclavicular, axillary or pectoral adenopathy.      Left upper body: No supraclavicular, axillary or pectoral adenopathy.   Skin:     General: Skin is warm and dry.      Coloration: Skin is not jaundiced.      Findings: No lesion or rash.   Neurological:      General: No focal deficit present.      Mental Status: She is alert and oriented to person, place, and time.      " Motor: No weakness.      Gait: Gait normal.   Psychiatric:         Mood and Affect: Mood normal.         Thought Content: Thought content normal.         Judgment: Judgment normal.         Laboratory Data:  Lab Results   Component Value Date    WBC 8.0 12/07/2023    HGB 15.3 12/07/2023    HCT 46.6 (H) 12/07/2023     (H) 12/07/2023     12/07/2023    ANC 2.20 08/08/2022       Chemistry    Lab Results   Component Value Date/Time     05/15/2024 0905    K 4.1 05/15/2024 0905    CL 98 05/15/2024 0905    CO2 32 05/15/2024 0905    BUN 8 05/15/2024 0905    CREATININE 0.69 05/15/2024 0905    Lab Results   Component Value Date/Time    CALCIUM 10.1 05/15/2024 0905    ALKPHOS 83 05/15/2024 0905    AST 15 05/15/2024 0905    ALT 11 05/15/2024 0905    BILITOT 0.6 05/15/2024 0905             Radiology:  CT head wo IV contrast  Narrative: Interpreted By:  Naeem Dewey,   STUDY:  CT HEAD WO IV CONTRAST; 12/7/2023 7:56 pm      INDICATION:  Signs/Symptoms: weakness.      COMPARISON:  None.      ACCESSION NUMBER(S):  [id number]      ORDERING CLINICIAN:  JOSE SHIPMAN      TECHNIQUE:  CT was performed with one or more of the following dose reduction  techniques: automated exposure control, adjustment of the mA and/or  kV according to patient size, or use of iterative reconstruction  technique.              PROCEDURE: 3.0 mm axial images were obtained through the brain, to  include the posterior fossa without intravenous contrast enhancement.      FINDINGS:  There is symmetric volume loss of the bilateral frontal lobes more  than the remainder of the cerebral hemispheres. Minimal decreased  attenuation in the bilateral periventricular white matter, consistent  with microangiopathy is noted.  There is no encephalomalacic change.  Brainstem is unremarkable. Cerebellar hemispheres are symmetric. No  intra- or extra-axial mass or abnormal blood products are  demonstrated.      Mild ethmoid mucosal thickening is seen with  posterior right  maxillary mucosal thickening. The remainder of the paranasal sinuses  are unremarkable.      Impression: 1. No acute abnormality of the brain.  2. Sinus disease as described.          This report has been produced using speech recognition.  This exam is available in DICOM format to non-affiliated healthcare  facilities on a secure media free searchable basis with prior patient  authorization. The patient exposure is reported to a radiation dose  index registry. All CT examinations are performed with one or more of  the following dose reduction techniques: Automated Exposure Control,  Adjustment of mA and/or KV according to patient size, or use of  iterative reconstruction techniques.      MACRO:  None      Signed by: Naeem Dewey 12/7/2023 8:26 PM  Dictation workstation:   [id number]  XR chest 2 views  Narrative: Interpreted By:  Naeem Dewey,   STUDY:  XR CHEST 2 VIEWS; 12/7/2023 8:00 pm      INDICATION:  Signs/Symptoms:weakness.      COMPARISON:  24 October 2021      ACCESSION NUMBER(S):  [id number]      ORDERING CLINICIAN:  JOSE SHIPMAN      TECHNIQUE:  AP erect and lateral views of the chest were performed.      FINDINGS:  The lungs are adequately inflated without air space disease or  effusion.  The heart and mediastinal structures are within normal  limits. There are no hilar, vascular, or pleural abnormalities.      Impression: No acute cardiopulmonary disease.      Signed by: Naeem Dewey 12/7/2023 8:16 PM  Dictation workstation:   [id number]       No results found for this or any previous visit from the past 365 days.          Assessment/Plan:    Julia Carrion is a 64 y.o. female with a history of right breast cancer, who presents today follow-up evaluation.    Locally advanced RIGHT breast cancer + Left DCIS.  Not deemed to be a candidate for chemotherapy. CDK4/6 inhibitor poorly tolerated in adjuvant setting.  - Continue anastrozole 1 mg daily, refill sent   - Continue with adj  zometa 5/15/2024 dose #2, #3 dose in Nov     Abdominal pain - now LUQ  Ddx: cholecystitis, acute hepatic pathology, cirrhosis (given drinking hx), other   - Never seen in ED, slightly better at this time   - CT CAP ordered given ongoing intermittent sharp pain along with advance breast cancer diagnosis   - Will call with results       Elevated Hemoglobin   Smoking history of > 40 pack years   Hgb on 8/31/23: 18.1 > 15.3   - Normalized in dec 2023 labs      Borderline osteopenia. Noted on baseline DEXA from 4/2023.  - Will initiate bisphosphonate therapy for breast cancer, as above.  - Zometa #2 given today   - Ca/VitD supplementation recommended.     RUE lymphedema. Improved     H/o stroke with residual left-sided deficits.  - Follows with Neurology. On atorvastatin, aspirin, Plavix.      Disposition   - RTC in 6 months   - Zometa q6 month, next due 11/13/2024 #3  - No further CDK4/6 inhibitor to be given.  - Encouraged to call with concerns/questions in the interim.       There is no evidence of breast cancer recurrence based on her clinical exam today.      Assess/Plan SmartLinks:   Problem List Items Addressed This Visit             ICD-10-CM    Malignant neoplasm of right breast in female, estrogen receptor positive (Multi) C50.911, Z17.0    Relevant Orders    CT chest abdomen pelvis w IV contrast    Infusion Appointment Request [facility]    Clinic Appointment Request [facility]; [facility]     Other Visit Diagnoses         Codes    Left upper quadrant abdominal pain    -  Primary R10.12    Relevant Orders    CT chest abdomen pelvis w IV contrast    Insomnia, unspecified type     G47.00    Relevant Medications    traZODone (Desyrel) 100 mg tablet    Abdominal pain, epigastric     R10.13    Relevant Orders    CT chest abdomen pelvis w IV contrast          Orders Placed This Encounter   Procedures    CT chest abdomen pelvis w IV contrast     Standing Status:   Future     Standing  Expiration Date:   5/15/2025     Order Specific Question:   Reason for exam:     Answer:   intermittent abdominal / tenderness to palpation of epigastric and LUQ / hx of breast cancer     Order Specific Question:   Radiologist to Determine Optimal Study     Answer:   Yes     Order Specific Question:   Release result to [facility]     Answer:   Immediate     Order Specific Question:   Is this exam part of a Research Study? If Yes, link this order to the research study     Answer:   No             Marimar Hutson PA-C  Hematology and Medical Oncology  [facility]

## 2024-05-15 ENCOUNTER — LAB (OUTPATIENT)
Dept: LAB | Facility: CLINIC | Age: 64
End: 2024-05-15
Payer: MEDICARE

## 2024-05-15 ENCOUNTER — INFUSION (OUTPATIENT)
Dept: HEMATOLOGY/ONCOLOGY | Facility: CLINIC | Age: 64
End: 2024-05-15
Payer: MEDICARE

## 2024-05-15 ENCOUNTER — OFFICE VISIT (OUTPATIENT)
Dept: HEMATOLOGY/ONCOLOGY | Facility: CLINIC | Age: 64
End: 2024-05-15
Payer: MEDICARE

## 2024-05-15 VITALS
WEIGHT: 147.71 LBS | RESPIRATION RATE: 18 BRPM | HEIGHT: 62 IN | BODY MASS INDEX: 27.18 KG/M2 | DIASTOLIC BLOOD PRESSURE: 82 MMHG | TEMPERATURE: 97.5 F | HEART RATE: 94 BPM | SYSTOLIC BLOOD PRESSURE: 139 MMHG | OXYGEN SATURATION: 94 %

## 2024-05-15 DIAGNOSIS — C50.911 MALIGNANT NEOPLASM OF RIGHT BREAST IN FEMALE, ESTROGEN RECEPTOR POSITIVE, UNSPECIFIED SITE OF BREAST (MULTI): ICD-10-CM

## 2024-05-15 DIAGNOSIS — Z17.0 MALIGNANT NEOPLASM OF RIGHT BREAST IN FEMALE, ESTROGEN RECEPTOR POSITIVE, UNSPECIFIED SITE OF BREAST (MULTI): ICD-10-CM

## 2024-05-15 DIAGNOSIS — G47.00 INSOMNIA, UNSPECIFIED TYPE: ICD-10-CM

## 2024-05-15 DIAGNOSIS — R10.12 LEFT UPPER QUADRANT ABDOMINAL PAIN: Primary | ICD-10-CM

## 2024-05-15 DIAGNOSIS — R10.13 ABDOMINAL PAIN, EPIGASTRIC: ICD-10-CM

## 2024-05-15 LAB
ALBUMIN SERPL BCP-MCNC: 4.5 G/DL (ref 3.4–5)
ALP SERPL-CCNC: 83 U/L (ref 33–136)
ALT SERPL W P-5'-P-CCNC: 11 U/L (ref 7–45)
ANION GAP SERPL CALC-SCNC: 13 MMOL/L (ref 10–20)
AST SERPL W P-5'-P-CCNC: 15 U/L (ref 9–39)
BILIRUB SERPL-MCNC: 0.6 MG/DL (ref 0–1.2)
BUN SERPL-MCNC: 8 MG/DL (ref 6–23)
CALCIUM SERPL-MCNC: 10.1 MG/DL (ref 8.6–10.3)
CHLORIDE SERPL-SCNC: 98 MMOL/L (ref 98–107)
CO2 SERPL-SCNC: 32 MMOL/L (ref 21–32)
CREAT SERPL-MCNC: 0.69 MG/DL (ref 0.5–1.05)
EGFRCR SERPLBLD CKD-EPI 2021: >90 ML/MIN/1.73M*2
GLUCOSE SERPL-MCNC: 125 MG/DL (ref 74–99)
MAGNESIUM SERPL-MCNC: 2.03 MG/DL (ref 1.6–2.4)
PHOSPHATE SERPL-MCNC: 4 MG/DL (ref 2.5–4.9)
POTASSIUM SERPL-SCNC: 4.1 MMOL/L (ref 3.5–5.3)
PROT SERPL-MCNC: 7.7 G/DL (ref 6.4–8.2)
SODIUM SERPL-SCNC: 139 MMOL/L (ref 136–145)

## 2024-05-15 PROCEDURE — 36415 COLL VENOUS BLD VENIPUNCTURE: CPT

## 2024-05-15 PROCEDURE — 99214 OFFICE O/P EST MOD 30 MIN: CPT

## 2024-05-15 PROCEDURE — 96374 THER/PROPH/DIAG INJ IV PUSH: CPT | Mod: INF

## 2024-05-15 PROCEDURE — 84100 ASSAY OF PHOSPHORUS: CPT

## 2024-05-15 PROCEDURE — 84075 ASSAY ALKALINE PHOSPHATASE: CPT

## 2024-05-15 PROCEDURE — 2500000004 HC RX 250 GENERAL PHARMACY W/ HCPCS (ALT 636 FOR OP/ED)

## 2024-05-15 PROCEDURE — 83735 ASSAY OF MAGNESIUM: CPT

## 2024-05-15 RX ORDER — DIPHENHYDRAMINE HYDROCHLORIDE 50 MG/ML
50 INJECTION INTRAMUSCULAR; INTRAVENOUS AS NEEDED
OUTPATIENT
Start: 2024-10-30

## 2024-05-15 RX ORDER — FAMOTIDINE 10 MG/ML
20 INJECTION INTRAVENOUS ONCE AS NEEDED
OUTPATIENT
Start: 2024-10-30

## 2024-05-15 RX ORDER — ZOLEDRONIC ACID 0.04 MG/ML
4 INJECTION, SOLUTION INTRAVENOUS ONCE
Status: COMPLETED | OUTPATIENT
Start: 2024-05-15 | End: 2024-05-15

## 2024-05-15 RX ORDER — TRAZODONE HYDROCHLORIDE 100 MG/1
100 TABLET ORAL NIGHTLY
Qty: 30 TABLET | Refills: 0 | Status: SHIPPED | OUTPATIENT
Start: 2024-05-15 | End: 2024-06-04 | Stop reason: SDUPTHER

## 2024-05-15 RX ORDER — EPINEPHRINE 0.3 MG/.3ML
0.3 INJECTION SUBCUTANEOUS EVERY 5 MIN PRN
OUTPATIENT
Start: 2024-10-30

## 2024-05-15 RX ORDER — ALBUTEROL SULFATE 0.83 MG/ML
3 SOLUTION RESPIRATORY (INHALATION) AS NEEDED
OUTPATIENT
Start: 2024-10-30

## 2024-05-15 RX ADMIN — ZOLEDRONIC ACID 4 MG: 0.04 INJECTION, SOLUTION INTRAVENOUS at 10:30

## 2024-05-15 RX ADMIN — SODIUM CHLORIDE 500 ML: 9 INJECTION, SOLUTION INTRAVENOUS at 10:30

## 2024-05-15 ASSESSMENT — PAIN SCALES - GENERAL: PAINLEVEL: 8

## 2024-05-29 ENCOUNTER — HOSPITAL ENCOUNTER (OUTPATIENT)
Dept: RADIOLOGY | Facility: HOSPITAL | Age: 64
Discharge: HOME | End: 2024-05-29
Payer: MEDICARE

## 2024-05-29 DIAGNOSIS — Z17.0 MALIGNANT NEOPLASM OF RIGHT BREAST IN FEMALE, ESTROGEN RECEPTOR POSITIVE, UNSPECIFIED SITE OF BREAST (MULTI): ICD-10-CM

## 2024-05-29 DIAGNOSIS — R10.12 LEFT UPPER QUADRANT ABDOMINAL PAIN: ICD-10-CM

## 2024-05-29 DIAGNOSIS — R10.13 ABDOMINAL PAIN, EPIGASTRIC: ICD-10-CM

## 2024-05-29 DIAGNOSIS — C50.911 MALIGNANT NEOPLASM OF RIGHT BREAST IN FEMALE, ESTROGEN RECEPTOR POSITIVE, UNSPECIFIED SITE OF BREAST (MULTI): ICD-10-CM

## 2024-05-29 PROCEDURE — 74177 CT ABD & PELVIS W/CONTRAST: CPT

## 2024-05-29 PROCEDURE — 71260 CT THORAX DX C+: CPT | Performed by: RADIOLOGY

## 2024-05-29 PROCEDURE — 74177 CT ABD & PELVIS W/CONTRAST: CPT | Performed by: RADIOLOGY

## 2024-05-29 PROCEDURE — 2550000001 HC RX 255 CONTRASTS

## 2024-05-29 RX ADMIN — IOHEXOL 75 ML: 350 INJECTION, SOLUTION INTRAVENOUS at 13:46

## 2024-06-03 ENCOUNTER — TELEPHONE (OUTPATIENT)
Dept: HEMATOLOGY/ONCOLOGY | Facility: CLINIC | Age: 64
End: 2024-06-03
Payer: MEDICARE

## 2024-06-03 NOTE — TELEPHONE ENCOUNTER
LVM for pt regarding CT CAP is negative for any acute pathologies particularly in the abdomen. Does note to have some  ground-glass appearance in bilateral lung bases could be inflammatory vs atelectasis vs possible PNA. She did not endorse any respiratory symptoms at our last visit but instructed to call if she is having any respiratory or infectious symptoms. Left # to call back for any questions or concerns. Will send information over my chart message as well.

## 2024-06-04 DIAGNOSIS — Z76.0 PRESCRIPTION REFILL: ICD-10-CM

## 2024-06-04 DIAGNOSIS — G47.00 INSOMNIA, UNSPECIFIED TYPE: ICD-10-CM

## 2024-06-04 RX ORDER — TRAZODONE HYDROCHLORIDE 100 MG/1
100 TABLET ORAL NIGHTLY
Qty: 90 TABLET | Refills: 1 | Status: SHIPPED | OUTPATIENT
Start: 2024-06-04

## 2024-06-04 RX ORDER — ATORVASTATIN CALCIUM 40 MG/1
40 TABLET, FILM COATED ORAL DAILY
Qty: 90 TABLET | Refills: 1 | Status: SHIPPED | OUTPATIENT
Start: 2024-06-04 | End: 2024-12-01

## 2024-06-04 NOTE — TELEPHONE ENCOUNTER
Pt left voicemail requesting refill(s) of the populated rx(s) and wants them sent to [facility]. Pt last seen 6/13/23.

## 2024-06-13 ENCOUNTER — TELEPHONE (OUTPATIENT)
Dept: HEMATOLOGY/ONCOLOGY | Facility: CLINIC | Age: 64
End: 2024-06-13
Payer: MEDICARE

## 2024-06-13 DIAGNOSIS — Z17.0 MALIGNANT NEOPLASM OF RIGHT BREAST IN FEMALE, ESTROGEN RECEPTOR POSITIVE, UNSPECIFIED SITE OF BREAST (MULTI): ICD-10-CM

## 2024-06-13 DIAGNOSIS — K21.9 GASTROESOPHAGEAL REFLUX DISEASE WITHOUT ESOPHAGITIS: ICD-10-CM

## 2024-06-13 DIAGNOSIS — C50.911 MALIGNANT NEOPLASM OF RIGHT BREAST IN FEMALE, ESTROGEN RECEPTOR POSITIVE, UNSPECIFIED SITE OF BREAST (MULTI): ICD-10-CM

## 2024-06-13 RX ORDER — FAMOTIDINE 20 MG/1
20 TABLET, FILM COATED ORAL 2 TIMES DAILY
Qty: 60 TABLET | Refills: 11 | Status: SHIPPED | OUTPATIENT
Start: 2024-06-13 | End: 2025-06-13

## 2024-06-13 RX ORDER — ANASTROZOLE 1 MG/1
1 TABLET ORAL DAILY
Qty: 30 TABLET | Refills: 11 | Status: SHIPPED | OUTPATIENT
Start: 2024-06-13

## 2024-06-13 NOTE — PROGRESS NOTES
"This is a 64 year old female for EVAL of \"LEG PAIN\" and has had CT CHEST ABDO pelvis May 2024 showing POSSIBLE INFLAMMATORY issues / pneumonia by HEME / ONC: in care of HEME/ONC for RIGHT BREAST CANCER so at RISK for DVT/PE so may do CT ANGIO to rule out PE due to risks    CURRENT RISKS:    AGE 64  BREAST CANCER on anastrozole though minimal has a constellation of risks and HAD CT CHEST abdo/PELVIS showing CHEST INFLAMMATORY issues in May this year  SEDENTARY  LEG PAIN (not typical for DVT)--edema with blotchy rash  SMOKER              Marimar Hutson PA-C  Hematology and Oncology     All Conversations: CT scan  (Oldest Message First)  Fouzia 3, 2024  Marimar Hutson PA-C   to Julia Carrion         6/3/24 12:14 PM  Mario Dumont,      I left you a voicemail regarding your CT scan. It is negative for any acute pathologies particularly in the abdomen. Does note to have some ground-glass appearance in bilateral lung bases could be inflammatory vs atelectasis vs possible pneumonia. Also notes stable pulmonary nodules which are quite small and not suspicious at this time.  Please let me know if you are having any issues with breathing or a cough or if you have any other concerns or questions.      All the best,   Marimar Hutson PA-C     This [employer] message has not been read.  This encounter is not signed. The conversation may still be ongoing. [employer] messages from related encounters have been included in this conversation summary.      ROS is NEG for HEADACHE, NAUSEA, VOMITING, DIARRHEA, CHEST PAIN, SOB, and BLEEDING and as further REVIEWED BELOW.      Subjective   Julia Quinterorosemarielarissa is a 64 y.o. female who presents for Leg Pain.    HPI:    Per nursing intake, pt here for Leg Pain       Review of systems is essentially negative for all systems except for any identified issues in HPI above.    Objective     There were no vitals taken for this visit.     Physical Examination:       GENERAL           General " Appearance: well-appearing, well-developed, well-hydrated, well-nourished, no acute distress.        HEENT           NECK supple, no masses or thyromegaly, no carotid bruit.        EYES           Extraocular Movements: normal, bilateral eyes TRISH, no conjunctival injection.        HEART           Rate and Rhythm regular rate and rhythm. Heart sounds: normal S1S2, no S3 or S4. Murmurs: none.        CHEST           Breath sounds: Clear to IPPA, RR<16 no use of accessory muscles.        ABDOMEN           General: Neg for LKKS or masses, no scleral icterus or jaundice.        MUSCULOSKELETAL           Joints Demonstration: Neg for erythema, swelling or joint deformities. gross abnormalities no gross abnormalities.        EXTREMITIES           Lower Extremities: Neg for cyanosis, clubbing or edema.       Assessment/Plan   Problem List Items Addressed This Visit    None  Visit Diagnoses       Pain of lower extremity, unspecified laterality    -  Primary            FOLLOW UP:  PRN and as specified above         Carolann Abrams M.D.

## 2024-06-20 ENCOUNTER — HOSPITAL ENCOUNTER (OUTPATIENT)
Dept: RADIOLOGY | Facility: HOSPITAL | Age: 64
Discharge: HOME | End: 2024-06-20
Payer: MEDICARE

## 2024-06-20 ENCOUNTER — OFFICE VISIT (OUTPATIENT)
Dept: PRIMARY CARE | Facility: CLINIC | Age: 64
End: 2024-06-20
Payer: MEDICARE

## 2024-06-20 VITALS
WEIGHT: 148 LBS | TEMPERATURE: 97.1 F | DIASTOLIC BLOOD PRESSURE: 80 MMHG | OXYGEN SATURATION: 97 % | BODY MASS INDEX: 27.41 KG/M2 | SYSTOLIC BLOOD PRESSURE: 134 MMHG | HEART RATE: 85 BPM

## 2024-06-20 DIAGNOSIS — Z91.89 AT HIGH RISK FOR PULMONARY EMBOLISM: ICD-10-CM

## 2024-06-20 DIAGNOSIS — I51.7 CARDIOMEGALY: ICD-10-CM

## 2024-06-20 DIAGNOSIS — R91.8 ABNORMAL CT SCAN OF LUNG: ICD-10-CM

## 2024-06-20 DIAGNOSIS — R21 RASH: ICD-10-CM

## 2024-06-20 DIAGNOSIS — R60.9 EDEMA, UNSPECIFIED TYPE: ICD-10-CM

## 2024-06-20 DIAGNOSIS — F17.200 TOBACCO USE DISORDER: ICD-10-CM

## 2024-06-20 DIAGNOSIS — M79.606 PAIN OF LOWER EXTREMITY, UNSPECIFIED LATERALITY: Primary | ICD-10-CM

## 2024-06-20 PROCEDURE — 71275 CT ANGIOGRAPHY CHEST: CPT

## 2024-06-20 PROCEDURE — 71275 CT ANGIOGRAPHY CHEST: CPT | Performed by: RADIOLOGY

## 2024-06-20 PROCEDURE — 99214 OFFICE O/P EST MOD 30 MIN: CPT | Performed by: FAMILY MEDICINE

## 2024-06-20 PROCEDURE — 2550000001 HC RX 255 CONTRASTS: Performed by: FAMILY MEDICINE

## 2024-06-20 ASSESSMENT — PAIN SCALES - GENERAL: PAINLEVEL: 6

## 2024-06-20 ASSESSMENT — PATIENT HEALTH QUESTIONNAIRE - PHQ9
SUM OF ALL RESPONSES TO PHQ9 QUESTIONS 1 AND 2: 0
2. FEELING DOWN, DEPRESSED OR HOPELESS: NOT AT ALL
1. LITTLE INTEREST OR PLEASURE IN DOING THINGS: NOT AT ALL

## 2024-06-23 NOTE — PROGRESS NOTES
"This is a 64 year old female for FU CT results:    REVIEWED WITH HER TODAY and referred (again) to BOTH PULM and CARDIO  ProBNP added as lab to help rule out HF    REFERRAL TO DERM requested by pt is obliged entered last visit and  provided [place] pt wants [facility] re referred         No CT evidence of pulmonary embolism in the current exam.      Mild hazy ground-glass densities in the lungs as described.  Atelectasis versus edema versus mild pneumonia.      Mild stable thoracic adenopathy as described, most likely reactive.      Stable cardiomegaly.      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      MACRO:  None      Signed by: Cas Nicole 6/20/2024 2:31 PM  Dictation workstation:   [id number]    SEE COPY of my last note details below:     Carolann Silva MD  Physician  Primary Care     Progress Notes     Signed     Encounter Date: 6/20/2024     Signed       Expand All Collapse All    This is a 64 year old female for EVAL of \"LEG PAIN\" and has had CT CHEST ABDO pelvis May 2024 showing POSSIBLE INFLAMMATORY issues / pneumonia by HEME / ONC: in care of HEME/ONC for RIGHT BREAST CANCER so at RISK for DVT/PE so may do CT ANGIO to rule out PE due to risks     CURRENT RISKS:     AGE 64  BREAST CANCER on anastrozole though minimal has a constellation of risks and HAD CT CHEST abdo/PELVIS showing CHEST INFLAMMATORY issues in May this year  SEDENTARY  LEG PAIN (not typical for DVT)--edema with blotchy rash  SMOKER                    CT angio chest for pulmonary embolism: Result Notes     Gisele Smallwood MA  6/21/2024  9:15 AM EDT       Informed and gave scheduling number and schedule her FU for next week.    Carolann Silva MD  6/20/2024  2:49 PM EDT       STABLE CT no PE but pos for ABN appearing post inflammatory and also ENLARGED HEART so will enter referral to CARDIOLOGY and PULM gonzalez CARDS due to the edema of legs today is suggestive of possible HR.  Please adv her of this and offer FU " visit with me next week.         ROS is NEG for HEADACHE, NAUSEA, VOMITING, DIARRHEA, CHEST PAIN, SOB, and BLEEDING and as further REVIEWED BELOW.      Subjective   Julia Carrion is a 64 y.o. female who presents for Results (FU CT).    HPI:    Per nursing intake, pt here for Results (FU CT)       Review of systems is essentially negative for all systems except for any identified issues in HPI above.    Objective     /78   Pulse 65   Temp 36.7 °C (98 °F)   Wt 68.9 kg (152 lb)   SpO2 96%   BMI 28.15 kg/m²      Physical Examination:       GENERAL           General Appearance: well-appearing, well-developed, well-hydrated, well-nourished, no acute distress.        HEENT           NECK supple, no masses or thyromegaly, no carotid bruit.        EYES           Extraocular Movements: normal, bilateral eyes TRISH, no conjunctival injection.        HEART           Rate and Rhythm regular rate and rhythm. Heart sounds: normal S1S2, no S3 or S4. Murmurs: none.        CHEST           Breath sounds: Clear to IPPA, RR<16 no use of accessory muscles.        ABDOMEN           General: Neg for LKKS or masses, no scleral icterus or jaundice.        MUSCULOSKELETAL           Joints Demonstration: Neg for erythema, swelling or joint deformities. gross abnormalities no gross abnormalities.        EXTREMITIES           Lower Extremities: Neg for cyanosis, clubbing BUT IMPROVED EDEMA and VASCULAR INSUFFICIENCY rashes Les       Assessment/Plan   Problem List Items Addressed This Visit    None  Visit Diagnoses       Cardiomegaly    -  Primary    Relevant Orders    Referral to Cardiology    B-type natriuretic peptide    Pain of lower extremity, unspecified laterality        At high risk for pulmonary embolism        Edema, unspecified type        Relevant Orders    Referral to Cardiology    B-type natriuretic peptide    Abnormal CT scan of lung        Relevant Orders    Referral to Cardiology    B-type natriuretic peptide     Referral to Pulmonology    History of aspiration pneumonia        History of breast cancer        Heart failure, unspecified HF chronicity, unspecified heart failure type (Multi)        Relevant Orders    B-type natriuretic peptide            FOLLOW UP:  PRN and as specified above         Carolann Abrams M.D.

## 2024-06-25 ENCOUNTER — TELEPHONE (OUTPATIENT)
Dept: HEMATOLOGY/ONCOLOGY | Facility: CLINIC | Age: 64
End: 2024-06-25
Payer: MEDICARE

## 2024-06-25 DIAGNOSIS — K21.9 GASTROESOPHAGEAL REFLUX DISEASE WITHOUT ESOPHAGITIS: ICD-10-CM

## 2024-06-25 DIAGNOSIS — C50.911 MALIGNANT NEOPLASM OF RIGHT BREAST IN FEMALE, ESTROGEN RECEPTOR POSITIVE, UNSPECIFIED SITE OF BREAST (MULTI): ICD-10-CM

## 2024-06-25 DIAGNOSIS — Z17.0 MALIGNANT NEOPLASM OF RIGHT BREAST IN FEMALE, ESTROGEN RECEPTOR POSITIVE, UNSPECIFIED SITE OF BREAST (MULTI): ICD-10-CM

## 2024-06-25 RX ORDER — FAMOTIDINE 20 MG/1
20 TABLET, FILM COATED ORAL 2 TIMES DAILY
Qty: 60 TABLET | Refills: 11 | Status: SHIPPED | OUTPATIENT
Start: 2024-06-25 | End: 2025-06-25

## 2024-06-25 RX ORDER — ANASTROZOLE 1 MG/1
1 TABLET ORAL DAILY
Qty: 30 TABLET | Refills: 11 | Status: SHIPPED | OUTPATIENT
Start: 2024-06-25

## 2024-06-25 NOTE — TELEPHONE ENCOUNTER
Marimar please re send these to the [facility] per patient request.  Thank you.

## 2024-06-27 ENCOUNTER — OFFICE VISIT (OUTPATIENT)
Dept: PRIMARY CARE | Facility: CLINIC | Age: 64
End: 2024-06-27
Payer: MEDICARE

## 2024-06-27 VITALS
DIASTOLIC BLOOD PRESSURE: 78 MMHG | TEMPERATURE: 98 F | OXYGEN SATURATION: 96 % | WEIGHT: 152 LBS | BODY MASS INDEX: 28.15 KG/M2 | HEART RATE: 65 BPM | SYSTOLIC BLOOD PRESSURE: 118 MMHG

## 2024-06-27 DIAGNOSIS — M79.606 PAIN OF LOWER EXTREMITY, UNSPECIFIED LATERALITY: ICD-10-CM

## 2024-06-27 DIAGNOSIS — R60.9 EDEMA, UNSPECIFIED TYPE: ICD-10-CM

## 2024-06-27 DIAGNOSIS — Z87.01 HISTORY OF ASPIRATION PNEUMONIA: ICD-10-CM

## 2024-06-27 DIAGNOSIS — Z85.3 HISTORY OF BREAST CANCER: ICD-10-CM

## 2024-06-27 DIAGNOSIS — R21 RASH: ICD-10-CM

## 2024-06-27 DIAGNOSIS — Z91.89 AT HIGH RISK FOR PULMONARY EMBOLISM: ICD-10-CM

## 2024-06-27 DIAGNOSIS — R91.8 ABNORMAL CT SCAN OF LUNG: ICD-10-CM

## 2024-06-27 DIAGNOSIS — I50.9 HEART FAILURE, UNSPECIFIED HF CHRONICITY, UNSPECIFIED HEART FAILURE TYPE (MULTI): ICD-10-CM

## 2024-06-27 DIAGNOSIS — I51.7 CARDIOMEGALY: Primary | ICD-10-CM

## 2024-06-27 PROCEDURE — 99214 OFFICE O/P EST MOD 30 MIN: CPT | Performed by: FAMILY MEDICINE

## 2024-06-27 ASSESSMENT — PATIENT HEALTH QUESTIONNAIRE - PHQ9
1. LITTLE INTEREST OR PLEASURE IN DOING THINGS: NOT AT ALL
SUM OF ALL RESPONSES TO PHQ9 QUESTIONS 1 AND 2: 0
2. FEELING DOWN, DEPRESSED OR HOPELESS: NOT AT ALL

## 2024-06-27 ASSESSMENT — ENCOUNTER SYMPTOMS
OCCASIONAL FEELINGS OF UNSTEADINESS: 1
LOSS OF SENSATION IN FEET: 0
DEPRESSION: 0

## 2024-06-27 ASSESSMENT — PAIN SCALES - GENERAL: PAINLEVEL: 7

## 2024-08-01 ENCOUNTER — APPOINTMENT (OUTPATIENT)
Dept: CARDIOLOGY | Facility: CLINIC | Age: 64
End: 2024-08-01
Payer: MEDICARE

## 2024-08-19 ENCOUNTER — OFFICE VISIT (OUTPATIENT)
Dept: CARDIOLOGY | Facility: CLINIC | Age: 64
End: 2024-08-19
Payer: MEDICARE

## 2024-08-19 ENCOUNTER — APPOINTMENT (OUTPATIENT)
Dept: HEMATOLOGY/ONCOLOGY | Facility: CLINIC | Age: 64
End: 2024-08-19
Payer: MEDICARE

## 2024-08-19 VITALS
OXYGEN SATURATION: 93 % | DIASTOLIC BLOOD PRESSURE: 72 MMHG | HEART RATE: 76 BPM | WEIGHT: 154 LBS | BODY MASS INDEX: 28.52 KG/M2 | SYSTOLIC BLOOD PRESSURE: 111 MMHG

## 2024-08-19 DIAGNOSIS — R60.9 EDEMA, UNSPECIFIED TYPE: ICD-10-CM

## 2024-08-19 DIAGNOSIS — I51.7 CARDIOMEGALY: ICD-10-CM

## 2024-08-19 DIAGNOSIS — F17.200 TOBACCO USE DISORDER: ICD-10-CM

## 2024-08-19 DIAGNOSIS — Z86.79 PERSONAL HISTORY OF OTHER DISEASES OF THE CIRCULATORY SYSTEM: Primary | ICD-10-CM

## 2024-08-19 PROCEDURE — 99203 OFFICE O/P NEW LOW 30 MIN: CPT | Performed by: INTERNAL MEDICINE

## 2024-08-19 PROCEDURE — 93005 ELECTROCARDIOGRAM TRACING: CPT | Performed by: INTERNAL MEDICINE

## 2024-08-19 PROCEDURE — 99213 OFFICE O/P EST LOW 20 MIN: CPT | Performed by: INTERNAL MEDICINE

## 2024-08-19 PROCEDURE — 93010 ELECTROCARDIOGRAM REPORT: CPT | Performed by: INTERNAL MEDICINE

## 2024-08-19 RX ORDER — ASPIRIN 81 MG/1
81 TABLET ORAL DAILY
COMMUNITY

## 2024-08-19 RX ORDER — LANOLIN ALCOHOL/MO/W.PET/CERES
1000 CREAM (GRAM) TOPICAL DAILY
COMMUNITY

## 2024-08-19 RX ORDER — CALCIUM CARBONATE/VITAMIN D3 500-10/5ML
LIQUID (ML) ORAL
COMMUNITY

## 2024-08-19 RX ORDER — BISMUTH SUBSALICYLATE 262 MG
1 TABLET,CHEWABLE ORAL DAILY
COMMUNITY

## 2024-08-19 ASSESSMENT — PAIN SCALES - GENERAL: PAINLEVEL: 6

## 2024-08-19 ASSESSMENT — ENCOUNTER SYMPTOMS
GASTROINTESTINAL NEGATIVE: 1
LOSS OF SENSATION IN FEET: 0
RESPIRATORY NEGATIVE: 1
COUGH: 0
CONSTITUTIONAL NEGATIVE: 1
OCCASIONAL FEELINGS OF UNSTEADINESS: 1
DEPRESSION: 0
MUSCULOSKELETAL NEGATIVE: 1
EYES NEGATIVE: 1
FEVER: 0
NEUROLOGICAL NEGATIVE: 1
CHILLS: 0
ENDOCRINE NEGATIVE: 1
FALLS: 0

## 2024-08-19 ASSESSMENT — PATIENT HEALTH QUESTIONNAIRE - PHQ9
1. LITTLE INTEREST OR PLEASURE IN DOING THINGS: NOT AT ALL
2. FEELING DOWN, DEPRESSED OR HOPELESS: NOT AT ALL
SUM OF ALL RESPONSES TO PHQ9 QUESTIONS 1 AND 2: 0

## 2024-08-19 NOTE — PROGRESS NOTES
Subjective      Chief Complaint   Patient presents with    Dr Jose Alberto robles cardiac eval           This 65-year-old female who comes in for cardiac evaluation.  She had a CT of the chest and was told her heart was enlarged.  She is not sure why the CT was done.  She does not complain of any chest pain chest pressures or discomforts.  She has never had a myocardial infarction.  There is no history rheumatic fever nor heart murmur.  There is no history of hypertension diabetes mellitus family's coronary artery disease.  She does have a history of smoking as well as hypercholesterolemia.  Her EKG today shows a normal sinus rhythm with left axis deviation and poor progress across precordium.  She is never had a myocardial infarction that she is aware of.  Of note the CT scan did not show congestive heart failure.  She otherwise remains active without problems.           Review of Systems   Constitutional: Negative. Negative for chills and fever.   HENT: Negative.     Eyes: Negative.    Respiratory: Negative.  Negative for cough.    Endocrine: Negative.    Skin: Negative.    Musculoskeletal: Negative.  Negative for falls.   Gastrointestinal: Negative.    Genitourinary: Negative.    Neurological: Negative.    All other systems reviewed and are negative.       Past Surgical History:   Procedure Laterality Date    BI STEREOTACTIC GUIDED BREAST LEFT LOCALIZATION AND BIOPSY Left 3/28/2022    BI STEREOTACTIC GUIDED BREAST LOCALIZATION AND BIOPSY LEFT [facility]ICAL [facility]    BI US GUIDED BREAST LOCALIZATION AND BIOPSY RIGHT Right 3/28/2022    BI US GUIDED BREAST LOCALIZATION AND BIOPSY RIGHT [facility]    BI US GUIDED BREAST LOCALIZATION RIGHT Right 9/26/2022    BI US GUIDED BREAST LOCALIZATION RIGHT [facility]    MR HEAD ANGIO WO IV CONTRAST  10/26/2021    MR HEAD ANGIO WO IV CONTRAST [facility]    OTHER SURGICAL HISTORY  01/04/2022    Cataract surgery    OTHER SURGICAL HISTORY  01/04/2022     Hysterectomy    US GUIDED BIOPSY LYMPH NODE SUPERFICIAL  3/28/2022    US GUIDED BIOPSY LYMPH NODE SUPERFICIAL [facility]        Active Ambulatory Problems     Diagnosis Date Noted    Personal history of breast cancer 10/17/2023    Acid reflux 10/17/2023    Deficiency of other specified B group vitamins 12/07/2023    Personal history of diseases of the blood and blood-forming organs and certain disorders involving the immune mechanism 12/07/2023    Personal history of other diseases of the circulatory system 12/07/2023    Personal history of other diseases of the nervous system and sense organs 12/07/2023    Personal history of other endocrine, nutritional and metabolic disease 12/07/2023    Personal history of transient ischemic attack (TIA), and cerebral infarction without residual deficits 12/07/2023    Malignant neoplasm of right breast in female, estrogen receptor positive (Multi) 01/09/2024     Resolved Ambulatory Problems     Diagnosis Date Noted    No Resolved Ambulatory Problems     Past Medical History:   Diagnosis Date    Cancer (Multi)     Sleep apnea     Stroke (Multi)         Visit Vitals  /72   Pulse 76   Wt 69.9 kg (154 lb)   SpO2 93%   BMI 28.52 kg/m²   Smoking Status Every Day   BSA 1.74 m²        Objective     Constitutional:       Appearance: Healthy appearance.   Neck:      Vascular: No JVR.   Pulmonary:      Effort: Pulmonary effort is normal.      Breath sounds: Normal breath sounds.   Cardiovascular:      PMI at left midclavicular line. Normal rate. Regular rhythm. Normal S1. Normal S2.       Murmurs: There is no murmur.      No gallop.  No click. No rub.   Pulses:     Intact distal pulses.   Abdominal:      Palpations: Abdomen is soft.   Musculoskeletal: Normal range of motion. Skin:     General: Skin is warm and dry.   Neurological:      General: No focal deficit present.            Lab Review:         Lab Results   Component Value Date    CHOL 161 10/26/2021     Lab Results  "  Component Value Date    HDL 42 (L) 10/26/2021     Lab Results   Component Value Date    LDLCALC 83 10/26/2021     Lab Results   Component Value Date    TRIG 182 (H) 10/26/2021     No components found for: \"CHOLHDL\"     Assessment/Plan     Personal history of other diseases of the circulatory system  This is a 64-year-old white female who comes in with a CT scan showing enlarged heart.  EKG shows poor progression was precordium and left axis deviation.  She has no history of ever having myocardial infarction nor she complaining of chest pains or discomforts.  No symptoms of congestive heart failure.  Would like to get an echocardiogram to look at the left ventricle.  Have reassured her doubt that the enlarged heart by CT scan is significant.  Will call her with results     "

## 2024-08-19 NOTE — ASSESSMENT & PLAN NOTE
This is a 64-year-old white female who comes in with a CT scan showing enlarged heart.  EKG shows poor progression was precordium and left axis deviation.  She has no history of ever having myocardial infarction nor she complaining of chest pains or discomforts.  No symptoms of congestive heart failure.  Would like to get an echocardiogram to look at the left ventricle.  Have reassured her doubt that the enlarged heart by CT scan is significant.  Will call her with results

## 2024-08-23 ENCOUNTER — APPOINTMENT (OUTPATIENT)
Dept: PULMONOLOGY | Facility: CLINIC | Age: 64
End: 2024-08-23
Payer: MEDICARE

## 2024-09-12 ENCOUNTER — APPOINTMENT (OUTPATIENT)
Dept: CARDIOLOGY | Facility: HOSPITAL | Age: 64
End: 2024-09-12
Payer: MEDICARE

## 2024-09-26 ENCOUNTER — OFFICE VISIT (OUTPATIENT)
Dept: URGENT CARE | Age: 64
End: 2024-09-26
Payer: MEDICARE

## 2024-09-26 VITALS
OXYGEN SATURATION: 93 % | TEMPERATURE: 98.3 F | SYSTOLIC BLOOD PRESSURE: 141 MMHG | HEART RATE: 94 BPM | RESPIRATION RATE: 18 BRPM | WEIGHT: 150 LBS | BODY MASS INDEX: 27.6 KG/M2 | HEIGHT: 62 IN | DIASTOLIC BLOOD PRESSURE: 78 MMHG

## 2024-09-26 DIAGNOSIS — M54.50 LEFT LOW BACK PAIN, UNSPECIFIED CHRONICITY, UNSPECIFIED WHETHER SCIATICA PRESENT: Primary | ICD-10-CM

## 2024-09-26 RX ORDER — METHYLPREDNISOLONE 4 MG/1
TABLET ORAL
Qty: 21 TABLET | Refills: 0 | Status: SHIPPED | OUTPATIENT
Start: 2024-09-26

## 2024-09-26 ASSESSMENT — ENCOUNTER SYMPTOMS
FATIGUE: 0
CHILLS: 0
BACK PAIN: 1
FEVER: 0

## 2024-09-26 NOTE — PATIENT INSTRUCTIONS
take tylenol, 1000mg every 8 hours.    take medrol dose pack with food.    ice 20 min on and 20 off, do not apply directly to skin.    after 48 hours alternate or switch to heat.    Use lidocaine pain patches, sold over the counter.     follow up with ortho/sports medicine if no improvement in 3 - 4 days, contact information provided.

## 2024-09-26 NOTE — PROGRESS NOTES
Subjective   Patient ID: Julia Carrion is a 64 y.o. female. They present today with a chief complaint of Back Pain (No injury ).    History of Present Illness  Left low back pain started Sun radiates down leg, doesn't remember doing anything to it    Hx of stroke, uses walker.     Denies trauma, loss bowel/bladder control.          Back Pain  Pertinent negatives include no fever.       Past Medical History  Allergies as of 09/26/2024    (No Known Allergies)       (Not in a hospital admission)       Past Medical History:   Diagnosis Date    Cancer (Multi)     Deficiency of other specified B group vitamins     Folate deficiency    Personal history of diseases of the blood and blood-forming organs and certain disorders involving the immune mechanism     History of macrocytosis    Personal history of other diseases of the circulatory system     History of hypertension    Personal history of other diseases of the nervous system and sense organs     History of peripheral neuropathy    Personal history of other endocrine, nutritional and metabolic disease     History of hypokalemia    Personal history of transient ischemic attack (TIA), and cerebral infarction without residual deficits     History of stroke    Sleep apnea     Stroke (Multi)        Past Surgical History:   Procedure Laterality Date    BI STEREOTACTIC GUIDED BREAST LEFT LOCALIZATION AND BIOPSY Left 3/28/2022    BI STEREOTACTIC GUIDED BREAST LOCALIZATION AND BIOPSY LEFT [facility]    BI US GUIDED BREAST LOCALIZATION AND BIOPSY RIGHT Right 3/28/2022    BI US GUIDED BREAST LOCALIZATION AND BIOPSY RIGHT [facility]    BI US GUIDED BREAST LOCALIZATION RIGHT Right 9/26/2022    BI US GUIDED BREAST LOCALIZATION RIGHT [facility]    MR HEAD ANGIO WO IV CONTRAST  10/26/2021    MR HEAD ANGIO WO IV CONTRAST [facility]    OTHER SURGICAL HISTORY  01/04/2022    Cataract surgery    OTHER SURGICAL HISTORY  01/04/2022    Hysterectomy     "US GUIDED BIOPSY LYMPH NODE SUPERFICIAL  3/28/2022    US GUIDED BIOPSY LYMPH NODE SUPERFICIAL [facility]        reports that she has been smoking cigarettes. She has a 22.5 pack-year smoking history. She has been exposed to tobacco smoke. She has never used smokeless tobacco. She reports current alcohol use of about 21.0 standard drinks of alcohol per week. She reports that she does not use drugs.    Review of Systems  Review of Systems   Constitutional:  Negative for chills, fatigue and fever.   Musculoskeletal:  Positive for back pain.   All other systems reviewed and are negative.                                 Objective    Vitals:    09/26/24 1238   BP: 141/78   Pulse: 94   Resp: 18   Temp: 36.8 °C (98.3 °F)   TempSrc: Oral   SpO2: 93%   Weight: 68 kg (150 lb)   Height: 1.575 m (5' 2\")     No LMP recorded (lmp unknown).    Physical Exam  Vitals and nursing note reviewed.   Constitutional:       General: She is not in acute distress.  Cardiovascular:      Rate and Rhythm: Normal rate and regular rhythm.      Heart sounds: Normal heart sounds.   Pulmonary:      Effort: Pulmonary effort is normal.      Breath sounds: Normal breath sounds.   Musculoskeletal:      Cervical back: Normal range of motion.      Comments: Neck full ROM. No midline vertebral tenderness. Tender left low lateral back.    LE bilat: strength 5/5, sensation intact.   Neurological:      Mental Status: She is alert.         Procedures    Point of Care Test & Imaging Results from this visit  No results found for this visit on 09/26/24.   No results found.    Diagnostic study results (if any) were reviewed by Latha Higgins PA-C.    Assessment/Plan   Allergies, medications, history, and pertinent labs/EKGs/Imaging reviewed by Latha Higgins PA-C.       Orders and Diagnoses  There are no diagnoses linked to this encounter.    Medical Admin Record      Patient disposition: Home    Electronically signed by Latha Higgins PA-C  1:13 PM      "

## 2024-11-13 ENCOUNTER — INFUSION (OUTPATIENT)
Dept: HEMATOLOGY/ONCOLOGY | Facility: CLINIC | Age: 64
End: 2024-11-13
Payer: MEDICARE

## 2024-11-13 ENCOUNTER — OFFICE VISIT (OUTPATIENT)
Dept: HEMATOLOGY/ONCOLOGY | Facility: CLINIC | Age: 64
End: 2024-11-13
Payer: MEDICARE

## 2024-11-13 ENCOUNTER — APPOINTMENT (OUTPATIENT)
Dept: HEMATOLOGY/ONCOLOGY | Facility: CLINIC | Age: 64
End: 2024-11-13
Payer: MEDICARE

## 2024-11-13 VITALS
WEIGHT: 158.29 LBS | HEART RATE: 74 BPM | TEMPERATURE: 97.1 F | RESPIRATION RATE: 18 BRPM | BODY MASS INDEX: 28.95 KG/M2 | SYSTOLIC BLOOD PRESSURE: 147 MMHG | DIASTOLIC BLOOD PRESSURE: 80 MMHG | OXYGEN SATURATION: 94 %

## 2024-11-13 DIAGNOSIS — Z78.0 MENOPAUSE: ICD-10-CM

## 2024-11-13 DIAGNOSIS — C50.911 MALIGNANT NEOPLASM OF RIGHT BREAST IN FEMALE, ESTROGEN RECEPTOR POSITIVE, UNSPECIFIED SITE OF BREAST: ICD-10-CM

## 2024-11-13 DIAGNOSIS — Z17.0 MALIGNANT NEOPLASM OF RIGHT BREAST IN FEMALE, ESTROGEN RECEPTOR POSITIVE, UNSPECIFIED SITE OF BREAST: ICD-10-CM

## 2024-11-13 LAB
ALBUMIN SERPL BCP-MCNC: 4.3 G/DL (ref 3.4–5)
ALP SERPL-CCNC: 88 U/L (ref 33–136)
ALT SERPL W P-5'-P-CCNC: 11 U/L (ref 7–45)
ANION GAP SERPL CALC-SCNC: 11 MMOL/L (ref 10–20)
AST SERPL W P-5'-P-CCNC: 14 U/L (ref 9–39)
BILIRUB SERPL-MCNC: 0.3 MG/DL (ref 0–1.2)
BUN SERPL-MCNC: 13 MG/DL (ref 6–23)
CALCIUM SERPL-MCNC: 10 MG/DL (ref 8.6–10.3)
CHLORIDE SERPL-SCNC: 104 MMOL/L (ref 98–107)
CO2 SERPL-SCNC: 29 MMOL/L (ref 21–32)
CREAT SERPL-MCNC: 0.6 MG/DL (ref 0.5–1.05)
EGFRCR SERPLBLD CKD-EPI 2021: >90 ML/MIN/1.73M*2
GLUCOSE SERPL-MCNC: 78 MG/DL (ref 74–99)
MAGNESIUM SERPL-MCNC: 1.98 MG/DL (ref 1.6–2.4)
POTASSIUM SERPL-SCNC: 4.1 MMOL/L (ref 3.5–5.3)
PROT SERPL-MCNC: 7.4 G/DL (ref 6.4–8.2)
SODIUM SERPL-SCNC: 140 MMOL/L (ref 136–145)

## 2024-11-13 PROCEDURE — 99214 OFFICE O/P EST MOD 30 MIN: CPT | Mod: 25

## 2024-11-13 PROCEDURE — 96365 THER/PROPH/DIAG IV INF INIT: CPT | Mod: INF

## 2024-11-13 PROCEDURE — 2500000004 HC RX 250 GENERAL PHARMACY W/ HCPCS (ALT 636 FOR OP/ED)

## 2024-11-13 PROCEDURE — 83735 ASSAY OF MAGNESIUM: CPT

## 2024-11-13 PROCEDURE — 99214 OFFICE O/P EST MOD 30 MIN: CPT

## 2024-11-13 PROCEDURE — 80053 COMPREHEN METABOLIC PANEL: CPT

## 2024-11-13 PROCEDURE — 84100 ASSAY OF PHOSPHORUS: CPT

## 2024-11-13 RX ORDER — DIPHENHYDRAMINE HYDROCHLORIDE 50 MG/ML
50 INJECTION INTRAMUSCULAR; INTRAVENOUS AS NEEDED
OUTPATIENT
Start: 2025-04-16

## 2024-11-13 RX ORDER — EPINEPHRINE 0.3 MG/.3ML
0.3 INJECTION SUBCUTANEOUS EVERY 5 MIN PRN
OUTPATIENT
Start: 2025-04-16

## 2024-11-13 RX ORDER — ALBUTEROL SULFATE 0.83 MG/ML
3 SOLUTION RESPIRATORY (INHALATION) AS NEEDED
Status: DISCONTINUED | OUTPATIENT
Start: 2024-11-13 | End: 2024-11-13 | Stop reason: HOSPADM

## 2024-11-13 RX ORDER — FAMOTIDINE 10 MG/ML
20 INJECTION INTRAVENOUS ONCE AS NEEDED
Status: DISCONTINUED | OUTPATIENT
Start: 2024-11-13 | End: 2024-11-13 | Stop reason: HOSPADM

## 2024-11-13 RX ORDER — ZOLEDRONIC ACID 0.04 MG/ML
4 INJECTION, SOLUTION INTRAVENOUS ONCE
OUTPATIENT
Start: 2025-04-16

## 2024-11-13 RX ORDER — FAMOTIDINE 10 MG/ML
20 INJECTION INTRAVENOUS ONCE AS NEEDED
OUTPATIENT
Start: 2025-04-16

## 2024-11-13 RX ORDER — EPINEPHRINE 0.3 MG/.3ML
0.3 INJECTION SUBCUTANEOUS EVERY 5 MIN PRN
Status: DISCONTINUED | OUTPATIENT
Start: 2024-11-13 | End: 2024-11-13 | Stop reason: HOSPADM

## 2024-11-13 RX ORDER — ALBUTEROL SULFATE 0.83 MG/ML
3 SOLUTION RESPIRATORY (INHALATION) AS NEEDED
OUTPATIENT
Start: 2025-04-16

## 2024-11-13 RX ORDER — ZOLEDRONIC ACID 0.04 MG/ML
4 INJECTION, SOLUTION INTRAVENOUS ONCE
Status: COMPLETED | OUTPATIENT
Start: 2024-11-13 | End: 2024-11-13

## 2024-11-13 RX ORDER — DIPHENHYDRAMINE HYDROCHLORIDE 50 MG/ML
50 INJECTION INTRAMUSCULAR; INTRAVENOUS AS NEEDED
Status: DISCONTINUED | OUTPATIENT
Start: 2024-11-13 | End: 2024-11-13 | Stop reason: HOSPADM

## 2024-11-13 ASSESSMENT — PAIN SCALES - GENERAL: PAINLEVEL_OUTOF10: 7

## 2024-11-13 NOTE — PROGRESS NOTES
Pt states she had teeth pulled, she can't remember exactly when - but she states that she's certain it has been more than a month ago. RN asked Marimar HERRERA if she's ok with giving zometa today despite the uncertainty of the tooth extraction date, she states it's ok to proceed. Pt educated about the risk of jaw necrosis if she were to have invasive dental work within 4 weeks before or after zometa.

## 2024-11-13 NOTE — PROGRESS NOTES
Patient ID: Julia Carrion is a 64 y.o. female.    The patient presents to clinic today for her history of breast cancer.     Cancer Staging   Malignant neoplasm of right breast in female, estrogen receptor positive  Staging form: Breast, AJCC 8th Edition  - Pathologic stage from 10/17/2022: No Stage Recommended (ypT2, pN2, cM0) - Unsigned        Diagnostic/Therapeutic History:  She felt a change in her right breast for at least 1 year.   BILATERAL BREAST CANCER     RIGHT  ER NH positive and HER2-negative.  Low genomic risk but high clinical risk.     MAMMAPRINT SUMMARY OF RESULTS:  LOW RISK LUMINAL-TYPE (A)                                 MAMMAPRINT INDEX:  +0.092                                                                                        Probability of pCR: 2%                                                                                                        Some delay in care related to CVA 10/2021.  Changes in the skin noted and at 2 areas of disease noted.  Clinically stage T3 N1.     Left Breast - DCIS      S/P 9/26/22 bilateral mastectomy--- yT4b N2 s/p 4 months of  neoadjuvant AI/palbociclib.  10/17 right ax dissection with 1/7 nodes involved.  - Attempted adjuvant therapy again with CDk4-6 inhib with very poor tolerance of Abemaciclib with transition to Palbociclib. This was also not tolerated.  - Maintained on anastrozole monotherapy.     Family History:  Sister with breast cancer in her 50's.    History of Present Illness (HPI)/Interval History:  Ms. Carrion presents for presents today for follow-up, treatment and surveillance. She is compliant on anastrozole.      No breast cancer concerns.      She denies any chest pain or breathing issues.     She denies any vision changes or headache issues, dizziness. Uses walker for stability since stroke.      She has baseline arthritic pain, no new pain.      She denies any skin lesions or masses.    She notes her abdominal pain is better.      She notes having back pain that improved with steroid taper. She does have some mild returned pain  but not as bad. Has been having this for a month and a half. Located low back and maybe 6/10. Okay without activity. Takes tyelnol as needed with okay relief.      She admits sleep has been better with CPAP. Denies hot flashes or mood swings.      Continues to smoke a pack and a half a day. No thoughts of quitting. Continues to be off hard liquor.      Review of Systems:  14-point ROS otherwise negative, as per HPI.    Past Medical History:   Diagnosis Date    Cancer (Multi)     Deficiency of other specified B group vitamins     Folate deficiency    Personal history of diseases of the blood and blood-forming organs and certain disorders involving the immune mechanism     History of macrocytosis    Personal history of other diseases of the circulatory system     History of hypertension    Personal history of other diseases of the nervous system and sense organs     History of peripheral neuropathy    Personal history of other endocrine, nutritional and metabolic disease     History of hypokalemia    Personal history of transient ischemic attack (TIA), and cerebral infarction without residual deficits     History of stroke    Sleep apnea     Stroke (Multi)        Past Surgical History:   Procedure Laterality Date    BI STEREOTACTIC GUIDED BREAST LEFT LOCALIZATION AND BIOPSY Left 3/28/2022    BI STEREOTACTIC GUIDED BREAST LOCALIZATION AND BIOPSY LEFT [facility]ICAL [facility]GAC[facility]    BI US GUIDED BREAST LOCALIZATION AND BIOPSY RIGHT Right 3/28/2022    BI US GUIDED BREAST LOCALIZATION AND BIOPSY RIGHT [facility]    BI US GUIDED BREAST LOCALIZATION RIGHT Right 9/26/2022    BI US GUIDED BREAST LOCALIZATION RIGHT [facility]    MR HEAD ANGIO WO IV CONTRAST  10/26/2021    MR HEAD ANGIO WO IV CONTRAST [facility]    OTHER SURGICAL HISTORY  01/04/2022    Cataract surgery    OTHER SURGICAL HISTORY  01/04/2022     Hysterectomy    US GUIDED BIOPSY LYMPH NODE SUPERFICIAL  3/28/2022    US GUIDED BIOPSY LYMPH NODE SUPERFICIAL [facility]       Social History     Socioeconomic History    Marital status:    Tobacco Use    Smoking status: Every Day     Current packs/day: 1.50     Average packs/day: 1.5 packs/day for 15.0 years (22.5 ttl pk-yrs)     Types: Cigarettes     Passive exposure: Current    Smokeless tobacco: Never   Vaping Use    Vaping status: Never Used   Substance and Sexual Activity    Alcohol use: Yes     Alcohol/week: 21.0 standard drinks of alcohol     Types: 21 Cans of beer per week     Comment: daily    Drug use: Never    Sexual activity: Defer       No Known Allergies      Current Outpatient Medications:     anastrozole (Arimidex) 1 mg tablet, Take 1 tablet (1 mg total) by mouth once daily.  Swallow whole with a drink of water., Disp: 30 tablet, Rfl: 11    aspirin 81 mg EC tablet, Take 1 tablet (81 mg) by mouth once daily., Disp: , Rfl:     atorvastatin (Lipitor) 40 mg tablet, Take 1 tablet (40 mg) by mouth once daily., Disp: 90 tablet, Rfl: 1    cyanocobalamin (Vitamin B-12) 1,000 mcg tablet, Take 1 tablet (1,000 mcg) by mouth once daily., Disp: , Rfl:     famotidine (Pepcid) 20 mg tablet, Take 1 tablet (20 mg) by mouth 2 times a day., Disp: 60 tablet, Rfl: 11    magnesium oxide 400 mg magnesium capsule, Take by mouth., Disp: , Rfl:     methylPREDNISolone (Medrol Dospak) 4 mg tablets, Take as directed on package., Disp: 21 tablet, Rfl: 0    multivitamin tablet, Take 1 tablet by mouth once daily., Disp: , Rfl:     NON FORMULARY, Folic acid 1mg tab daily, Disp: , Rfl:     traZODone (Desyrel) 100 mg tablet, Take 1 tablet (100 mg) by mouth once daily at bedtime., Disp: 90 tablet, Rfl: 1     Objective    BSA: There is no height or weight on file to calculate BSA.  There were no vitals taken for this visit.    Performance Status:  The ECOG performance scale today is ECO- Restricted in physically  strenuous activity.  Carries out light duty.    Physical Exam  Vitals reviewed.   Constitutional:       General: She is awake. She is not in acute distress.     Appearance: Normal appearance. She is not ill-appearing.   HENT:      Mouth/Throat:      Pharynx: Oropharynx is clear. No oropharyngeal exudate.   Eyes:      General: No scleral icterus.     Conjunctiva/sclera: Conjunctivae normal.   Neck:      Trachea: Trachea and phonation normal. No tracheal tenderness.   Cardiovascular:      Rate and Rhythm: Normal rate and regular rhythm.      Heart sounds: No murmur heard.     No friction rub. No gallop.   Pulmonary:      Effort: Pulmonary effort is normal. No respiratory distress.      Breath sounds: Normal breath sounds. No stridor. No wheezing, rhonchi or rales.   Chest:      Chest wall: No mass, lacerations, deformity or tenderness.   Breasts:     Right: Absent.      Left: Absent.      Comments: S/p bilateral mastectomies   Abdominal:      General: Abdomen is flat. There is no distension.      Palpations: Abdomen is soft. There is no mass.      Tenderness: There is no abdominal tenderness.   Lymphadenopathy:      Cervical: No cervical adenopathy.      Upper Body:      Right upper body: No supraclavicular, axillary or pectoral adenopathy.      Left upper body: No supraclavicular, axillary or pectoral adenopathy.   Skin:     General: Skin is warm and dry.      Coloration: Skin is not jaundiced.      Findings: No lesion or rash.   Neurological:      General: No focal deficit present.      Mental Status: She is alert and oriented to person, place, and time.      Motor: No weakness.      Gait: Gait normal.   Psychiatric:         Mood and Affect: Mood normal.         Thought Content: Thought content normal.         Judgment: Judgment normal.         Laboratory Data:  Lab Results   Component Value Date    WBC 8.0 12/07/2023    HGB 15.3 12/07/2023    HCT 46.6 (H) 12/07/2023     (H) 12/07/2023     12/07/2023     ANC 2.20 08/08/2022       Chemistry    Lab Results   Component Value Date/Time     05/15/2024 0905    K 4.1 05/15/2024 0905    CL 98 05/15/2024 0905    CO2 32 05/15/2024 0905    BUN 8 05/15/2024 0905    CREATININE 0.69 05/15/2024 0905    Lab Results   Component Value Date/Time    CALCIUM 10.1 05/15/2024 0905    ALKPHOS 83 05/15/2024 0905    AST 15 05/15/2024 0905    ALT 11 05/15/2024 0905    BILITOT 0.6 05/15/2024 0905             Radiology:  ECG 12 Lead  NSR poor R wave across the precordium  LAD       No results found for this or any previous visit from the past 365 days.          Assessment/Plan:    Julia Carrion is a 64 y.o. female with a history of right breast cancer, who presents today follow-up evaluation.    Locally advanced RIGHT breast cancer + Left DCIS.  Not deemed to be a candidate for chemotherapy. CDK4/6 inhibitor poorly tolerated in adjuvant setting.  - Continue anastrozole 1 mg daily  - Continue with adj zometa today dose #3, #4 dose in May   - Monitor back pain - does sound MSK in nature. Will monitor     Elevated Hemoglobin   Smoking history of > 40 pack years   Hgb on 8/31/23: 18.1 > 15.3   - Normalized in dec 2023 labs      Borderline osteopenia. Noted on baseline DEXA from 4/2023.  - Will initiate bisphosphonate therapy for breast cancer, as above.  - Zometa #2 given today   - Ca/VitD supplementation recommended.     RUE lymphedema. Improved     H/o stroke with residual left-sided deficits.  - Follows with Neurology. On atorvastatin, aspirin, Plavix.      Disposition   - RTC in 6 months   - Zometa q6 month, next due 5/2025 #4  - No further CDK4/6 inhibitor to be given.  - Encouraged to call with concerns/questions in the interim.       There is no evidence of breast cancer recurrence based on her clinical exam today.      Assess/Plan SmartLinks:   Problem List Items Addressed This Visit    None      No orders of the defined types were placed in this encounter.            Marimar  ISRAEL Hutson PA-C  Hematology and Medical Oncology  [facility]

## 2024-11-14 LAB — PHOSPHATE SERPL-MCNC: 3.7 MG/DL (ref 2.5–4.9)

## 2024-11-18 DIAGNOSIS — E78.5 HYPERLIPIDEMIA, UNSPECIFIED HYPERLIPIDEMIA TYPE: ICD-10-CM

## 2024-11-18 DIAGNOSIS — Z76.0 PRESCRIPTION REFILL: ICD-10-CM

## 2024-11-18 DIAGNOSIS — G47.00 INSOMNIA, UNSPECIFIED TYPE: ICD-10-CM

## 2024-11-18 RX ORDER — ATORVASTATIN CALCIUM 40 MG/1
40 TABLET, FILM COATED ORAL DAILY
Qty: 90 TABLET | Refills: 0 | Status: SHIPPED | OUTPATIENT
Start: 2024-11-18 | End: 2025-02-16

## 2024-11-18 RX ORDER — TRAZODONE HYDROCHLORIDE 100 MG/1
100 TABLET ORAL NIGHTLY
Qty: 90 TABLET | Refills: 0 | Status: SHIPPED | OUTPATIENT
Start: 2024-11-18

## 2024-11-18 NOTE — TELEPHONE ENCOUNTER
New Patient appointment scheduled with Dr. Johnston 12/27/2024-PT requesting refills be sent to [facility] order pharmacy. PT no longer uses [facility] in [place]-Pharmacy updated in chart.

## 2024-11-18 NOTE — TELEPHONE ENCOUNTER
Pt left voicemail requesting refills of Trazodone and Atorvastatin. Trazodone sent on 4/1/24. Atorvastatin rx populated. Pt last seen 6/13/23.   
no

## 2024-12-09 NOTE — PROGRESS NOTES
MEDICARE WELLNESS        HPI:  Julia Carrion is a 64 y.o. female SMOKER (1.5 ppd x 45 years) here TO ESTABLISH PCP as a TRANSFER OF CARE FROM Dr. ABRAMS  for a  Medicare Wellness Visit and FOLLOW UP VISIT FOR DLD, GERD, and OTHER CHRONIC CONDITIONS.      EMR/EPIC records reviewed    Last saw Dr. Abrams 6/27/24 to discuss and review CT chest angio results.    PMHx:  Hyperlipidemia  GERD  Malignant neoplasm of right breast in female, estrogen receptor positive  Deficiency of other specified B group vitamins  Personal history of diseases of the blood and blood-forming organs and certain disorders involving the immune mechanism  Personal history of other diseases of the circulatory system  Personal history of other diseases of the nervous system and sense organs  Personal history of other endocrine, nutritional and metabolic disease  Personal history of transient ischemic attack (TIA), and cerebral infarction without residual deficits  DEVYN on CPAP  S/p hysterectomy    CT chest angio (6/20/24):  Interpreted By: Cas Nicole,   IMPRESSION:  No CT evidence of pulmonary embolism in the current exam.      Mild hazy ground-glass densities in the lungs as described.  Atelectasis versus edema versus mild pneumonia.      Mild stable thoracic adenopathy as described, most likely reactive.      Stable cardiomegaly.      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          Healthcare Providers:  Oncology: Marimar Hutson PA-C ; breast cancer  Cardiology: Bert Alfred MD; evaluated cardiomegaly  [facility] pulmonology: KATHERINE Toledo.CNP  Dermatology: Dr. Ledesma   Prior PCP: DR. Abrams        Preventive Health Services:  -Last physical: ?  -last pap smear:  no longer indicated s/p hysterectomy  -last mammogram: 2022==> NOW DUE  -last colonoscopy/cologuard: ?==> NOW DUE  -last STI screening: ?  -Hep C screening: ?       Immunizations:  -Childhood vaccines: completed per patient  -updated COVID spike vaccine: NOW DUE  -TDAP: NOW  DUE  -Flu vaccine: DUE NOW  -shingles: NOW DUE  -RSV: NOW DUE  -Prevnar 20: DUE NOW      There is no immunization history on file for this patient.               Today  Julia reports:     - ongoing chromic lower back pain x 1+ years, rated in severity 5-7/10, not worsening over time. She reports in the past was seen in ED and given steroids that was helpful and is requesting medrol dose pack. She denies fevers/chills, neck pain, saddle anesthesia, urinary or fecal incontinence or retention, numbness/tingling, weakness, difficulty ambulating  or history of back trauma. She reports that she uses a  rollator walker. She expressed interest in referral to PT and orthopedic spine for evaluation.    -otherwise doing and feeling well     -taking all medications as prescribed with no reported adverse medication side effects     -has scheduled appt with oncology 5/2025     Today  she  has no other reported complaints, issues, or problems.  ROS is NEG for HEADACHE, NAUSEA, VOMITING, DIARRHEA, CHEST PAIN, SOB, and BLEEDING.  Review of systems (12) is negative for all systems except for any identified issues in HPI above.         Health Maintenance    Social History:  Tobacco: CURRENT SMOKER (1.5 ppd x 45 years)  EtOH: denies  Patient reports routine vision checks and dental cleanings, and regular exercise.     Advanced Directives:  Patient DOES NOT have advanced directives in place.  Counseled and discussed importance with patient during visit today.  Provided assistance as necessary.    Opioid Medications:  Does the patient use opioid medications: NO    Overall Health:  How does the patient rate their health status today:  Good    Cognitive Screen:  AAAx3  to person, place and time: Yes  3 word recall: Banana, Chair, Sunrise  - Immediate recall: Yes  - 5 minutes recall: Yes  Impression: No cognitive deficiency observed during screening or encounter today     Vision/Hearing Screen:  Vision (required for WELCOME TO MEDICARE  VISIT ONLY):  NA  Hearing screen: reports no difficulty with hearing and passes finger rub test bilaterally      Immunizations:  -Childhood vaccines: completed per patient  -updated COVID spike vaccine: NOW DUE  -TDAP: NOW DUE  -Flu vaccine: DUE NOW  -shingles: NOW DUE  -RSV: NOW DUE      There is no immunization history on file for this patient.         Preventive Health Services and Screenings:  -Last physical/medicare wellness visit: 12/12/24  -last pap smear: ?  -last mammogram: 2022==> NOW DUE  -last colonoscopy/cologuard: ?==> NOW DUE  -last STI screening: ?  -Hep C screening: ?  -DEXA:  4/4/23 normal    Screening Pap due 21-65, Q3, Q5 with nml HPV after 29 y/o  Screening Mammogram :  yearly ages 40-75, shared decision making age >75  Screening Colonoscopy:  age 45-75, shared decision making age >75  DEXA scan 65 or 60 with risks, [id number] after  AAA screen men 65-75 men with ANY smoking history  Low dose CT of lungs:  yearly for 50-80 with at least 20 year pack history.  Current smokers and those who quit <15 years ago.  Coronary Ca score men >45, women >55,   Hep C screen:  one time all adults age 18-79        Reviewed:   Past Medical History/Allergies:  Yes  Family History:  Yes  Social History:  Yes  Current Medications:  Yes  Vital Signs:  Yes  Advanced Directives:  discussed  Immunizations:  reviewed today  Home Safety:                    Up & Go test > 30 seconds?  No                   Home have rugs; lack grab bars in bathroom; lack handrail on stairs; have poor lighting?  No                   Hearing difficulties?  No  Geriatric Assessment           ADL areas requiring assistance:  Does not need help with , Dressing, Eating, Ambulating, Toileting, Grooming, Hygiene.            IADL areas requiring assistance:  Does not need help with , Shopping, Housework, Accounting, Transportation, Driving.   Medications reviewed           Current supplements  Reviewed and recorded.   Other providers            Reviewed and recorded  Current providers and suppliers:     PHQ9/GAD7:         Current Medications  Current Outpatient Medications   Medication Instructions    anastrozole (ARIMIDEX) 1 mg, oral, Daily, Swallow whole with a drink of water.    aspirin 81 mg, Daily    atorvastatin (LIPITOR) 40 mg, oral, Daily    cyanocobalamin (VITAMIN B-12) 1,000 mcg, Daily    famotidine (PEPCID) 20 mg, oral, 2 times daily    magnesium oxide 400 mg magnesium capsule Take by mouth.    methylPREDNISolone (Medrol Dospak) 4 mg tablets Take as directed on package.    multivitamin tablet 1 tablet, Daily    NON FORMULARY Folic acid 1mg tab daily    traZODone (DESYREL) 100 mg, oral, Nightly        History  No Known Allergies   Past Medical History:   Diagnosis Date    Cancer (Multi)     Deficiency of other specified B group vitamins     Folate deficiency    Personal history of diseases of the blood and blood-forming organs and certain disorders involving the immune mechanism     History of macrocytosis    Personal history of other diseases of the circulatory system     History of hypertension    Personal history of other diseases of the nervous system and sense organs     History of peripheral neuropathy    Personal history of other endocrine, nutritional and metabolic disease     History of hypokalemia    Personal history of transient ischemic attack (TIA), and cerebral infarction without residual deficits     History of stroke    Sleep apnea     Stroke (Multi)       Past Surgical History:   Procedure Laterality Date    BI STEREOTACTIC GUIDED BREAST LEFT LOCALIZATION AND BIOPSY Left 3/28/2022    BI STEREOTACTIC GUIDED BREAST LOCALIZATION AND BIOPSY LEFT [facility]    BI US GUIDED BREAST LOCALIZATION AND BIOPSY RIGHT Right 3/28/2022    BI US GUIDED BREAST LOCALIZATION AND BIOPSY RIGHT [facility] CL[facility]ICAL LEGAC[facility]    BI US GUIDED BREAST LOCALIZATION RIGHT Right 9/26/2022    BI US GUIDED BREAST LOCALIZATION RIGHT [facility] INPATIENT LEGACY    MR HEAD  ANGIO WO IV CONTRAST  10/26/2021    MR HEAD ANGIO WO IV CONTRAST [facility]    OTHER SURGICAL HISTORY  01/04/2022    Cataract surgery    OTHER SURGICAL HISTORY  01/04/2022    Hysterectomy    US GUIDED BIOPSY LYMPH NODE SUPERFICIAL  3/28/2022    US GUIDED BIOPSY LYMPH NODE SUPERFICIAL [facility]     Family History   Problem Relation Name Age of Onset    Cancer Sister La      Social History     Tobacco Use    Smoking status: Every Day     Current packs/day: 1.50     Average packs/day: 1.5 packs/day for 15.0 years (22.5 ttl pk-yrs)     Types: Cigarettes     Passive exposure: Current    Smokeless tobacco: Never   Vaping Use    Vaping status: Never Used   Substance Use Topics    Alcohol use: Yes     Alcohol/week: 21.0 standard drinks of alcohol     Types: 21 Cans of beer per week     Comment: daily    Drug use: Never     Tobacco Use: High Risk (12/12/2024)    Patient History     Smoking Tobacco Use: Every Day     Smokeless Tobacco Use: Never     Passive Exposure: Current        ROS  All pertinent positive symptoms are included in the history of present illness.  All other systems have been reviewed and are negative and noncontributory to this patient's current ailments.    VITAL SIGNS  Vitals:    12/12/24 1026   BP: 126/68   Pulse: 92   Temp: 36.2 °C (97.1 °F)   SpO2: 98%     Vitals:    12/12/24 1026   Weight: 69.4 kg (153 lb)      Body mass index is 27.98 kg/m².     PHYSICAL EXAM    GENERAL  Well-appearing, pleasant and cooperative.  No acute distress. Ambulates with rollator.    HEENT  HEAD:   Normocephalic.  Atraumatic.  EYES:  PERRLA.  No scleral icterus or conjunctival injection.  EARS:  Tympanic membranes visualized bilaterally without erythema, fluid, or bulging.  NECK:  No adenopathy.  No palpable thyroid enlargement or nodules.    THROAT:  Moist oropharynx without tonsillar enlargement or exudates.    LUNGS:    Clear to auscultation bilaterally.  No wheezes, rales,  rhonchi.    CARDIAC:  Regular rate and rhythm.  Normal S1S2.  No murmurs/rubs/gallops.    ABDOMEN:  Soft, non-tender, non-distended.  No hepatosplenomegaly.  Normoactive bowel sounds.    MUSCULOSKELETAL:  No gross abnormalities.   No joint swelling or erythema,.  No spinal or paraspinal tenderness to palpation. SPINE (cervical==> coccyx): non-tender to palpation. Somewhat limited ROM of spine due to pain. + TTP of paraspinal lumbar muscles.    EXTREMITIES:  No LE edema or cyanosis.      NEURO           Alert and oriented x3. No focal deficits.    PSYCH:          Affect appropriate.   SKIN: No rashes. No lesions.    Preventative Services reviewed with patient, instructions provided    Assessment/Plan   Problem List Items Addressed This Visit       Hyperlipidemia    Relevant Orders    Lipid Panel     Other Visit Diagnoses       Medicare annual wellness visit, subsequent    -  Primary    Depression screen        Advanced care planning/counseling discussion        Cardiac risk counseling        History of breast cancer        Diabetes mellitus screening        Relevant Orders    Hemoglobin A1C    Vitamin D deficiency        Relevant Orders    Vitamin D 25-Hydroxy,Total (for eval of Vitamin D levels)    Routine screening for STI (sexually transmitted infection)        Relevant Orders    Syphilis Screen with Reflex    HIV 1/2 Antigen/Antibody Screen with Reflex to Confirmation    Breast cancer screening by mammogram        Relevant Orders    BI mammo bilateral screening tomosynthesis    Cervical cancer screening        Relevant Orders    Referral to Gynecology    Colon cancer screening        Relevant Orders    CBC and Auto Differential    Colonoscopy Screening; Average Risk Patient    Cologuard® colon cancer screening    Immunization counseling        Encounter for hepatitis C screening test for low risk patient        Relevant Orders    Hepatitis C Antibody    Annual physical exam        Relevant Orders    TSH with  reflex to Free T4 if abnormal    Urinalysis with Reflex Microscopic    Comprehensive Metabolic Panel    Other fatigue        Relevant Orders    CBC and Auto Differential    Hyperglycemia        Relevant Orders    Hemoglobin A1C          Medicare wellness Visit and Annual Physical: completed today     Chronic lower back pain: no red flag signs/sxs  -lumbar spine XR ordered  -medrol dose pack  -lidocaine patches as needed  -referral to PT and orthopedic spine ordered  -Emergency Dept precautions discussed and reviwed with patient    hyperlipidemia  -lipid panel ordered  -cont statin  - low cholesterol diet, regularly exercise, and limit alcohol intake    History of breast cancer: followed by oncology  -cont medications and management by oncology      Diabetes Screening  -HgBA1c ordered     Vitamin D deficiency  -Vit D levels ordered     Hep C screening  -Hep C antibody ordered     STI Screening: declined HIV, syphilis screening     Cervical Cancer Screening  -GYN referral ordered for well woman visit and pap smear     Breast Cancer Screening  -mammogram ordered    Smoking dependence: precontemplative  -encouraged patient to cut back and quit smoking  -patient declined smoking cessation resources       Colon cancer Screening  -colonoscopy and cologuard ordered; patient advised to only complete 1 of these screenings    Depression Screening  Depression screening completed using the PHQ-2 questions with results documented in the chart/encounter (15min)  (See Rooming Screening section for documentation, and/or progress note for additional information)     Cardiac Risk Assessment:  Cardiovascular risk was discussed and ,if needed, lifestyle modifications recommended, including nutritional choices, exercise, and elimination of habits contributing to risk.  We agreed on a plan to reduce the current cardiovascular risk based on above discussion as needed.  Aspirin use/disuse was discussed and documented in the Problem List of  the medical record (under Cardiac Risk Counseling) after reviewing the updated guidelines below:  Consider low dose Aspirin ( mg) use if the benefit for cardiovascular disease prevention outweighs risk for bleeding complications.  In general, low dose ASA should be considered:  In patients WITHOUT prior MI/stroke/PAD (primary prevention):  a.Age <60: Use if 10-year cardiovascular disease risk >20%, with discussion of risks and benefits with patient  b.Age 60-<70: Use if 10-year cardiovascular disease risk >20% and low bleeding (e.g., gastrointestinal) risk, with discussion of risks and benefits with patient  c.Age >=70: Do not use  In patients WITH prior MI/stroke/PAD (secondary prevention):  Generally use unless extremely high bleeding (e.g., gastrointestinal) risk, with discussion of risks and benefits with patient  (~16 min spent discussing above)     Advance Directives Discussion  Advanced Care Planning (including a Living Will, Healthcare POA, as well as specific end of life choices and/or directives), was discussed with the patient and/or surrogate, voluntarily, and details of that discussion documented in the Problem List (under Advanced Directives Discussion) of the medical record.  (~16 min spent discussing above)       Counseling:      Medication education:        Education:  The patient is counseled regarding potential side-effects of all new medications        Understanding:  Patient expressed understanding        Adherence:  No barriers to adherence identified        Immunizations Counseling  -flu and TDAP vaccine now due==> declined  -recommend updated COVID spike vaccine, shingles, and RSV that can be obtained at local pharmacy     FOLLOW-UP: 4 weeks to discuss and review test results    I have personally reviewed all available pertinent labs, imaging, and consult notes with the patient.      Discussed recommended plan of care with patient. Patient expressed understanding and agreement with plan  of care. All of patient's questions were answered at the time. Patient had no additional questions at the time.         India Johnston MD, PhD

## 2024-12-12 ENCOUNTER — OFFICE VISIT (OUTPATIENT)
Dept: PRIMARY CARE | Facility: CLINIC | Age: 64
End: 2024-12-12
Payer: MEDICARE

## 2024-12-12 VITALS
SYSTOLIC BLOOD PRESSURE: 126 MMHG | BODY MASS INDEX: 27.98 KG/M2 | OXYGEN SATURATION: 98 % | DIASTOLIC BLOOD PRESSURE: 68 MMHG | WEIGHT: 153 LBS | TEMPERATURE: 97.1 F | HEART RATE: 92 BPM

## 2024-12-12 DIAGNOSIS — Z11.3 ROUTINE SCREENING FOR STI (SEXUALLY TRANSMITTED INFECTION): ICD-10-CM

## 2024-12-12 DIAGNOSIS — Z12.11 COLON CANCER SCREENING: ICD-10-CM

## 2024-12-12 DIAGNOSIS — Z71.89 ADVANCED CARE PLANNING/COUNSELING DISCUSSION: ICD-10-CM

## 2024-12-12 DIAGNOSIS — Z12.31 BREAST CANCER SCREENING BY MAMMOGRAM: ICD-10-CM

## 2024-12-12 DIAGNOSIS — Z12.4 CERVICAL CANCER SCREENING: ICD-10-CM

## 2024-12-12 DIAGNOSIS — Z85.3 HISTORY OF BREAST CANCER: ICD-10-CM

## 2024-12-12 DIAGNOSIS — R53.83 OTHER FATIGUE: ICD-10-CM

## 2024-12-12 DIAGNOSIS — Z71.89 CARDIAC RISK COUNSELING: ICD-10-CM

## 2024-12-12 DIAGNOSIS — Z00.00 MEDICARE ANNUAL WELLNESS VISIT, SUBSEQUENT: Primary | ICD-10-CM

## 2024-12-12 DIAGNOSIS — R73.9 HYPERGLYCEMIA: ICD-10-CM

## 2024-12-12 DIAGNOSIS — Z00.00 ANNUAL PHYSICAL EXAM: ICD-10-CM

## 2024-12-12 DIAGNOSIS — Z11.59 ENCOUNTER FOR HEPATITIS C SCREENING TEST FOR LOW RISK PATIENT: ICD-10-CM

## 2024-12-12 DIAGNOSIS — Z13.1 DIABETES MELLITUS SCREENING: ICD-10-CM

## 2024-12-12 DIAGNOSIS — M54.50 LOW BACK PAIN, UNSPECIFIED BACK PAIN LATERALITY, UNSPECIFIED CHRONICITY, UNSPECIFIED WHETHER SCIATICA PRESENT: ICD-10-CM

## 2024-12-12 DIAGNOSIS — Z13.31 DEPRESSION SCREEN: ICD-10-CM

## 2024-12-12 DIAGNOSIS — E55.9 VITAMIN D DEFICIENCY: ICD-10-CM

## 2024-12-12 DIAGNOSIS — Z71.85 IMMUNIZATION COUNSELING: ICD-10-CM

## 2024-12-12 DIAGNOSIS — E78.2 MIXED HYPERLIPIDEMIA: ICD-10-CM

## 2024-12-12 PROCEDURE — G0446 INTENS BEHAVE THER CARDIO DX: HCPCS | Performed by: FAMILY MEDICINE

## 2024-12-12 PROCEDURE — 99497 ADVNCD CARE PLAN 30 MIN: CPT | Mod: 33,25 | Performed by: FAMILY MEDICINE

## 2024-12-12 PROCEDURE — 99396 PREV VISIT EST AGE 40-64: CPT | Performed by: FAMILY MEDICINE

## 2024-12-12 PROCEDURE — G0444 DEPRESSION SCREEN ANNUAL: HCPCS | Performed by: FAMILY MEDICINE

## 2024-12-12 PROCEDURE — 99214 OFFICE O/P EST MOD 30 MIN: CPT | Mod: 25 | Performed by: FAMILY MEDICINE

## 2024-12-12 PROCEDURE — 99214 OFFICE O/P EST MOD 30 MIN: CPT | Performed by: FAMILY MEDICINE

## 2024-12-12 PROCEDURE — 99497 ADVNCD CARE PLAN 30 MIN: CPT | Performed by: FAMILY MEDICINE

## 2024-12-12 PROCEDURE — 4004F PT TOBACCO SCREEN RCVD TLK: CPT | Performed by: FAMILY MEDICINE

## 2024-12-12 PROCEDURE — 99397 PER PM REEVAL EST PAT 65+ YR: CPT | Mod: 25 | Performed by: FAMILY MEDICINE

## 2024-12-12 PROCEDURE — G0439 PPPS, SUBSEQ VISIT: HCPCS | Performed by: FAMILY MEDICINE

## 2024-12-12 PROCEDURE — 99215 OFFICE O/P EST HI 40 MIN: CPT | Performed by: FAMILY MEDICINE

## 2024-12-12 RX ORDER — METHYLPREDNISOLONE 4 MG/1
TABLET ORAL
Qty: 21 TABLET | Refills: 0 | Status: SHIPPED | OUTPATIENT
Start: 2024-12-12 | End: 2024-12-18

## 2024-12-12 RX ORDER — LIDOCAINE 50 MG/G
1 PATCH TOPICAL DAILY
Qty: 30 PATCH | Refills: 11 | Status: SHIPPED | OUTPATIENT
Start: 2024-12-12 | End: 2025-12-12

## 2024-12-12 ASSESSMENT — PATIENT HEALTH QUESTIONNAIRE - PHQ9
SUM OF ALL RESPONSES TO PHQ9 QUESTIONS 1 AND 2: 0
1. LITTLE INTEREST OR PLEASURE IN DOING THINGS: NOT AT ALL
2. FEELING DOWN, DEPRESSED OR HOPELESS: NOT AT ALL

## 2024-12-12 ASSESSMENT — COLUMBIA-SUICIDE SEVERITY RATING SCALE - C-SSRS
2. HAVE YOU ACTUALLY HAD ANY THOUGHTS OF KILLING YOURSELF?: NO
6. HAVE YOU EVER DONE ANYTHING, STARTED TO DO ANYTHING, OR PREPARED TO DO ANYTHING TO END YOUR LIFE?: NO
1. IN THE PAST MONTH, HAVE YOU WISHED YOU WERE DEAD OR WISHED YOU COULD GO TO SLEEP AND NOT WAKE UP?: NO

## 2024-12-12 ASSESSMENT — PAIN SCALES - GENERAL: PAINLEVEL_OUTOF10: 7

## 2024-12-23 DIAGNOSIS — Z17.0 MALIGNANT NEOPLASM OF RIGHT BREAST IN FEMALE, ESTROGEN RECEPTOR POSITIVE, UNSPECIFIED SITE OF BREAST: Primary | ICD-10-CM

## 2024-12-23 DIAGNOSIS — C50.911 MALIGNANT NEOPLASM OF RIGHT BREAST IN FEMALE, ESTROGEN RECEPTOR POSITIVE, UNSPECIFIED SITE OF BREAST: Primary | ICD-10-CM

## 2024-12-27 ENCOUNTER — APPOINTMENT (OUTPATIENT)
Dept: PRIMARY CARE | Facility: CLINIC | Age: 64
End: 2024-12-27
Payer: MEDICARE

## 2025-01-14 ENCOUNTER — TRANSCRIBE ORDERS (OUTPATIENT)
Dept: ORTHOPEDIC SURGERY | Facility: HOSPITAL | Age: 65
End: 2025-01-14
Payer: MEDICARE

## 2025-01-14 DIAGNOSIS — M54.50 LOW BACK PAIN, UNSPECIFIED BACK PAIN LATERALITY, UNSPECIFIED CHRONICITY, UNSPECIFIED WHETHER SCIATICA PRESENT: ICD-10-CM

## 2025-01-14 DIAGNOSIS — G47.00 INSOMNIA, UNSPECIFIED TYPE: ICD-10-CM

## 2025-01-14 DIAGNOSIS — E78.5 HYPERLIPIDEMIA, UNSPECIFIED HYPERLIPIDEMIA TYPE: ICD-10-CM

## 2025-01-15 RX ORDER — ATORVASTATIN CALCIUM 40 MG/1
40 TABLET, FILM COATED ORAL DAILY
Qty: 90 TABLET | Refills: 0 | Status: SHIPPED | OUTPATIENT
Start: 2025-01-15

## 2025-01-15 RX ORDER — TRAZODONE HYDROCHLORIDE 100 MG/1
100 TABLET ORAL NIGHTLY
Qty: 90 TABLET | Refills: 0 | Status: SHIPPED | OUTPATIENT
Start: 2025-01-15

## 2025-01-16 ENCOUNTER — EVALUATION (OUTPATIENT)
Dept: PHYSICAL THERAPY | Facility: CLINIC | Age: 65
End: 2025-01-16
Payer: MEDICARE

## 2025-01-16 DIAGNOSIS — M54.50 LOW BACK PAIN, UNSPECIFIED BACK PAIN LATERALITY, UNSPECIFIED CHRONICITY, UNSPECIFIED WHETHER SCIATICA PRESENT: ICD-10-CM

## 2025-01-16 LAB — NONINV COLON CA DNA+OCC BLD SCRN STL QL: POSITIVE

## 2025-01-16 PROCEDURE — 97162 PT EVAL MOD COMPLEX 30 MIN: CPT | Mod: GP | Performed by: PHYSICAL THERAPIST

## 2025-01-16 PROCEDURE — 97110 THERAPEUTIC EXERCISES: CPT | Mod: GP | Performed by: PHYSICAL THERAPIST

## 2025-01-16 ASSESSMENT — ENCOUNTER SYMPTOMS
LOSS OF SENSATION IN FEET: 1
DEPRESSION: 0
OCCASIONAL FEELINGS OF UNSTEADINESS: 1

## 2025-01-16 ASSESSMENT — PATIENT HEALTH QUESTIONNAIRE - PHQ9
2. FEELING DOWN, DEPRESSED OR HOPELESS: NOT AT ALL
1. LITTLE INTEREST OR PLEASURE IN DOING THINGS: NOT AT ALL
SUM OF ALL RESPONSES TO PHQ9 QUESTIONS 1 AND 2: 0

## 2025-01-16 ASSESSMENT — PAIN - FUNCTIONAL ASSESSMENT: PAIN_FUNCTIONAL_ASSESSMENT: 0-10

## 2025-01-16 ASSESSMENT — PAIN SCALES - GENERAL: PAINLEVEL_OUTOF10: 3

## 2025-01-16 NOTE — PROGRESS NOTES
Physical Therapy  Physical Therapy Orthopedic Evaluation    Patient Name: Julia Carrion  MRN: [id number]  Today's Date: 1/16/2025  Time Calculation  Start Time: 1005  Stop Time: 1051  Time Calculation (min): 46 min  PT Evaluation Time Entry  PT Evaluation (Moderate) Time Entry: 32  PT Therapeutic Procedures Time Entry  Therapeutic Exercise Time Entry: 13       Insurance:  Payor: [employer] MEDICARE / Plan: [facility] MEDICARE ADVANTAGE / Product Type: *No Product type* /   Number of Treatments Authorized: 1/?  Certification Period Start Date: 01/16/25  Certification Period End Date: 04/16/25 (??)    Current Problem  Problem List Items Addressed This Visit             ICD-10-CM       Musculoskeletal and Injuries    Low back pain M54.50    Relevant Orders    Follow Up In Physical Therapy        General:  General  Reason for Referral: LBP/Gait  Referred By: YOJANA Johnston  Past Medical History Relevant to Rehab: transient ischemic attack (TIA), cerebral infarction with left sided weakness, breast cancer with chemo/radiation/double mastectomy    Precautions:   Precautions  STEADI Fall Risk Score (The score of 4 or more indicates an increased risk of falling): 7  Precautions Comment: moderate fall risk    Medical History Form: Reviewed (scanned into chart)    Subjective:   Subjective   Chief Complaint:   Over the past 6 months, has had shooting/sharp pains in L low back . L sided weakness from stroke.  Walking around the house without device but uses walls/furniture. Notes she tries to keep moving but is hard especially with the weather. Able to dress and perform ADLs without assistance but it is very hard and exhausting. L LE numbness (and L UE) since stroke.   Stairs at home ~5-6 up/down with B HR, mainly step to pattern.   Hx of stroke in 10/2021 - rehab x 6 weeks following. Has been using rollator since. Had HHPT and was doing well until breast cancer dx in 3/2022. Chemo/radiation with double mastectomy.   Sleeps well with  CPAP.     Pain:  Pain Assessment: 0-10  0-10 (Numeric) Pain Score: 3 (7/10)  Pain Type: Chronic pain  Pain Location: Back  Pain Orientation: Left      Relevant Information (PMH & Previous Tests/Imaging): none recent    Prior Level of Function (PLOF)  Patient previously independent with all ADLs  Exercise/Physical Activity: none  Work/School: retired     Patients Living Environment: Reviewed and no concern    Primary Language: English    Patient's Goal(s) for Therapy: Less back and leg pain to move around better    Personal factors/comorbidities affecting outcomes: see PMH above    Red Flags: Do you have any of the following? No  Fever/chills, unexplained weight changes, dizziness/fainting, unexplained change in bowel or bladder functions, unexplained malaise or muscle weakness, night pain/sweats, numbness or tingling    Objective:  Pt presents with rollator, L hip steppage pattern with good foot clearance     AROM Lumbar in % of normal   Flexion 50% pain   Extension 33% pain    R lateral flexion 50%  L lateral flexion 50%     PROM Hip NT    Hip MMT  R Hip flex 4+/5     ER 4+/5     IR 4/5     Abd 4/5     Add 5/5    L Hip flex 4-/5     ER 4/5     IR 4/5     Abd 4/5     Add 4/5     TUG with rollator: 15.3 sec, 14.3 sec    L/R SLS unable  Narrow CANDACE ~20 sec - fearful - CGA     Outcome Measures:  Other Measures  5x Sit to Stand: 16.85  Oswestry Disablity Index (MARNI): 48     EDUCATION: Home exercise program, plan of care, activity modifications, pain management, and injury pathology  Outpatient Education  Individual(s) Educated: Patient  Education Provided: Home Exercise Program, Anatomy, POC  Patient/Caregiver Demonstrated Understanding: yes  Plan of Care Discussed and Agreed Upon: yes  Patient Response to Education: Patient/Caregiver Verbalized Understanding of Information  Education Comment: Access Code: [email or web address]  URL: [email or web address]  Date: 01/16/2025  Prepared by: Shayla Perez     Exercises  - Sit to Stand Without Arm Support  - 2 x daily - 7 x weekly - 1 sets - 10 reps  - Hooklying Single Knee to Chest  - 2 x daily - 7 x weekly - 1 sets - 5 reps - 5 hold  - Supine Lower Trunk Rotation  - 2 x daily - 7 x weekly - 1 sets - 10 reps  - Supine Double Knee to Chest  - 2 x daily - 7 x weekly - 1 sets - 10 reps - 5 sec hold  - Supine March  - 2 x daily - 7 x weekly - 2 sets - 10 reps    Treatment Performed:  Therapeutic Exercise  Therapeutic Exercise Performed: Yes  Therapeutic Exercise Activity 1: HEP performed 1x through    Assessment: Patient is 64 year old who presents to physical therapy with signs and symptoms consistent with chronic LBP and L sided weakness since CVA in 2021. Patient has decreased lumbar ROM with pain and L>R strength deficits limiting functional mobility and ADLs. Pt would benefit from skilled physical therapy in order to address the stated deficits and return to daily tasks with reduced pain and improved function.    Clinical Presentation: Evolving with changing characteristics  Personal Factors: Comorbidities - hx of stroke and breast cancer    Plan:  Treatment/Interventions: Cryotherapy, Education/ Instruction, Electrical stimulation, Gait training, Hot pack, Manual therapy, Neuromuscular re-education, Self care/ home management, Taping techniques, Therapeutic activities, Therapeutic exercises  PT Plan: Skilled PT (response to flexion, LQ strengthening (L sided weakness from stroke))  PT Frequency: 1 time per week  Duration: 8-10 weeks, reducing frequency as goals are met  Onset Date: 07/16/24  Certification Period Start Date: 01/16/25  Certification Period End Date: 04/16/25 (??)  Number of Treatments Authorized: 1/?  Rehab Potential: Good  Plan of Care Agreement: Patient       Screening  Frequency  Date Last Completed   Spiritual and Cultural Beliefs   Screening each visit or episode of care 1/16/2025   Falls Risk Screening every ambulatory visit 1/16/2025 10:23 AM    Pain Screening annually at primary care visit  12/12/2024   Domestic Violence screening annually at primary care visit 12/12/2024   Depression Screening annually in the primary care setting 1/16/2025   Suicide Risk Screening annually in the primary care setting 12/12/2024   Nutrition and Food Insecurity   Screening at least annually at primary care visit     Key Learner annually in the primary care setting 1/16/2025   Drug Screen  12/12/2024 10:31 AM   Alcohol Screen  12/12/2024 10:31 AM   Advance Directive  12/12/2024       Goals: Set and discussed today  Active       LBP/Gait       STG/LTG       Start:  01/16/25    Expected End:  03/27/25       STG  1)  Patient will improve Oswestry Low Back Disability Questionnaire by 10% in order to indicate a measurable improvement in daily function and ability to complete daily tasks in 4 weeks.  2) Patient will be able to complete ADLs with pain less than 4/10 in back in 4 weeks.  3) Patient will be able to ambulate x 150' with LRAD for short community ambulation and to reduce fall risk.   4) Patient will be independent with HEP to allow for continued improvement in daily tasks at home and in the community in 3 visits.   LTG  1) Patient will have >/=4+/5 strength in lateral hip musculature to aid in stability with ambulation on varied surfaces in community in 8 weeks  2) Patient will be able to perform proper squatting technique in order to prevent increased pain with daily tasks in 8 weeks.  3) Patient will be able to perform >2 seconds of L SLS on even ground in order to allow for safe ambulation and to demonstrate reduced fall risk within the community in 8 weeks.   4) Patient will be able to ambulate x 300' with LRAD for improved ability to navigate community ambulation.   5)  Patient will improve Oswestry Low Back Disability Questionnaire <20% in order to indicate a measurable improvement in daily function and ability to complete daily tasks in 4 weeks.                  Plan of care was developed with input and agreement by the patient      Shayla Perez, PT

## 2025-01-17 ENCOUNTER — HOSPITAL ENCOUNTER (OUTPATIENT)
Dept: RADIOLOGY | Facility: CLINIC | Age: 65
Discharge: HOME | End: 2025-01-17
Payer: MEDICARE

## 2025-01-17 ENCOUNTER — APPOINTMENT (OUTPATIENT)
Dept: ORTHOPEDIC SURGERY | Facility: CLINIC | Age: 65
End: 2025-01-17
Payer: MEDICARE

## 2025-01-17 VITALS — BODY MASS INDEX: 28.16 KG/M2 | WEIGHT: 153 LBS | HEIGHT: 62 IN

## 2025-01-17 DIAGNOSIS — M54.50 LOW BACK PAIN, UNSPECIFIED BACK PAIN LATERALITY, UNSPECIFIED CHRONICITY, UNSPECIFIED WHETHER SCIATICA PRESENT: ICD-10-CM

## 2025-01-17 DIAGNOSIS — M51.360 DEGENERATION OF INTERVERTEBRAL DISC OF LUMBAR REGION WITH DISCOGENIC BACK PAIN: Primary | ICD-10-CM

## 2025-01-17 PROCEDURE — 3008F BODY MASS INDEX DOCD: CPT | Performed by: ORTHOPAEDIC SURGERY

## 2025-01-17 PROCEDURE — 72110 X-RAY EXAM L-2 SPINE 4/>VWS: CPT

## 2025-01-17 PROCEDURE — 99203 OFFICE O/P NEW LOW 30 MIN: CPT | Performed by: ORTHOPAEDIC SURGERY

## 2025-01-17 NOTE — LETTER
January 20, 2025     India Johnston MD PhD  [address] [facility] [address]    Patient: Julia Carrion   YOB: 1960   Date of Visit: 1/17/2025       Dear Dr. India Johnston MD PhD:    Thank you for referring Julia Carrion to me for evaluation. Below are my notes for this consultation.  If you have questions, please do not hesitate to call me. I look forward to following your patient along with you.       Sincerely,     Nader Courtney MD      CC: No Recipients  ______________________________________________________________________________________    HPI:Julia Carrion is a pleasant 64-year-old woman who comes in with complaints of years of low back pain.  At times the pain is shooting in nature.  It was bad enough that she went to the urgent care and was given some oral steroids.  She has not started physical therapy.  She denies constitutional symptoms.      ROS:  Reviewed on EMR and patient intake sheet.    PMH/SH:  Reviewed on EMR and patient intake sheet.    Exam:  Physical Exam    Constitutional: Well appearing; no acute distress  Eyes: pupils are equal and round  Psych: normal affect  Respiratory: non-labored breathing  Cardiovascular: regular rate and rhythm  GI: non-distended abdomen  Musculoskeletal: no pain with range of motion of the hips bilaterally  Neurologic: [4+]/5 strength in the lower extremities bilaterally]; [negative] straight leg raise    Radiology:     X-rays demonstrate moderate disc degeneration L4-L5    Diagnosis:    Acute low back pain; degenerative disc disease    Assessment and Plan:   64-year-old woman with acute low back pain in the setting of degenerative disc disease.  The natural history of disc degeneration was discussed at length.  I stressed that the natural history of disc degeneration is usually favorable, with pain and disability decreasing over time when treated with a multi-modal, focused rehabilitation program.  I encouraged, therefore,  continued non-operative care, focusing on core strengthening, regular cardiovascular exercise, abstaining from nicotine products, and maintaining a healthy weight.    At this time, I have recommended physical therapy.  I can see her back as needed.    The patient was in agreement with the plan. At the end of the visit today, the patient felt that all questions had been answered satisfactorily.  The patient was pleased with the visit and very appreciative for the care rendered.     Thank you very much for the kind referral.  It is a privilege, and a pleasure, to partner with you in the care of your patients.  I would be delighted to assist you with any further consultations as needed.          Nader Courtney MD    Chief of Spine Surgery, [facility]  Director of Spine Service, [facility]  , Department of Orthopaedics  [facility] [employer] [facility]  [address] [place] [address]  P: [phone]  [email or web address]    This note was dictated with voice recognition software.  It has not been proofread for grammatical errors, typographical mistakes or other semantic inconsistencies.

## 2025-01-18 DIAGNOSIS — R19.5 POSITIVE COLORECTAL CANCER SCREENING USING COLOGUARD TEST: Primary | ICD-10-CM

## 2025-01-20 NOTE — PROGRESS NOTES
HPI:Julia Carrion is a pleasant 64-year-old woman who comes in with complaints of years of low back pain.  At times the pain is shooting in nature.  It was bad enough that she went to the urgent care and was given some oral steroids.  She has not started physical therapy.  She denies constitutional symptoms.      ROS:  Reviewed on EMR and patient intake sheet.    PMH/SH:  Reviewed on EMR and patient intake sheet.    Exam:  Physical Exam    Constitutional: Well appearing; no acute distress  Eyes: pupils are equal and round  Psych: normal affect  Respiratory: non-labored breathing  Cardiovascular: regular rate and rhythm  GI: non-distended abdomen  Musculoskeletal: no pain with range of motion of the hips bilaterally  Neurologic: [4+]/5 strength in the lower extremities bilaterally]; [negative] straight leg raise    Radiology:     X-rays demonstrate moderate disc degeneration L4-L5    Diagnosis:    Acute low back pain; degenerative disc disease    Assessment and Plan:   64-year-old woman with acute low back pain in the setting of degenerative disc disease.  The natural history of disc degeneration was discussed at length.  I stressed that the natural history of disc degeneration is usually favorable, with pain and disability decreasing over time when treated with a multi-modal, focused rehabilitation program.  I encouraged, therefore, continued non-operative care, focusing on core strengthening, regular cardiovascular exercise, abstaining from nicotine products, and maintaining a healthy weight.    At this time, I have recommended physical therapy.  I can see her back as needed.    The patient was in agreement with the plan. At the end of the visit today, the patient felt that all questions had been answered satisfactorily.  The patient was pleased with the visit and very appreciative for the care rendered.     Thank you very much for the kind referral.  It is a privilege, and a pleasure, to partner with you in  the care of your patients.  I would be delighted to assist you with any further consultations as needed.          Nader Courtney MD    Chief of Spine Surgery, [facility]  Director of Spine Service, [facility]  , Department of Orthopaedics  [facility] [employer] [facility]  [address] [place] [address]  P: [phone]  [email or web address]    This note was dictated with voice recognition software.  It has not been proofread for grammatical errors, typographical mistakes or other semantic inconsistencies.

## 2025-01-21 ENCOUNTER — APPOINTMENT (OUTPATIENT)
Dept: PHYSICAL THERAPY | Facility: CLINIC | Age: 65
End: 2025-01-21
Payer: MEDICARE

## 2025-01-30 ENCOUNTER — TREATMENT (OUTPATIENT)
Dept: PHYSICAL THERAPY | Facility: CLINIC | Age: 65
End: 2025-01-30
Payer: MEDICARE

## 2025-01-30 DIAGNOSIS — M54.50 LOW BACK PAIN, UNSPECIFIED BACK PAIN LATERALITY, UNSPECIFIED CHRONICITY, UNSPECIFIED WHETHER SCIATICA PRESENT: Primary | ICD-10-CM

## 2025-01-30 PROCEDURE — 97110 THERAPEUTIC EXERCISES: CPT | Mod: GP,CQ

## 2025-01-30 PROCEDURE — 97530 THERAPEUTIC ACTIVITIES: CPT | Mod: GP,CQ

## 2025-01-30 ASSESSMENT — PAIN - FUNCTIONAL ASSESSMENT: PAIN_FUNCTIONAL_ASSESSMENT: 0-10

## 2025-01-30 ASSESSMENT — PAIN SCALES - GENERAL: PAINLEVEL_OUTOF10: 5 - MODERATE PAIN

## 2025-01-30 NOTE — PROGRESS NOTES
"  Physical Therapy Treatment    Patient Name: Julia Carrion  MRN: [id number]  Today's Date: 1/30/2025  Time Calculation  Start Time: 1014  Stop Time: 1058  Time Calculation (min): 44 min  PT Therapeutic Procedures Time Entry  Manual Therapy Time Entry: 6  Therapeutic Exercise Time Entry: 30  Therapeutic Activity Time Entry: 8,      Current Problem  Problem List Items Addressed This Visit             ICD-10-CM    Low back pain - Primary M54.50       Insurance:  Number of Treatments Authorized: 2/5  Certification Period Start Date: 01/16/25  Certification Period End Date: 03/16/25      Subjective   General  Reason for Referral: LBP/Gait  Referred By: YOJANA Johnston  Past Medical History Relevant to Rehab: transient ischemic attack (TIA), cerebral infarction with left sided weakness, breast cancer with chemo/radiation/double mastectomy  General Comment: Patient saw her physician who advised she continue with PT. Patient feels like the exercises make her legs \"jello-ie\".    Performing HEP?: Yes    Precautions  Precautions  STEADI Fall Risk Score (The score of 4 or more indicates an increased risk of falling): 7  Precautions Comment: moderate fall risk  Pain  Pain Assessment: 0-10  0-10 (Numeric) Pain Score: 5 - Moderate pain  Pain Type: Chronic pain  Pain Location: Back  Pain Orientation: Left    Objective       Treatments:  standing march x 1 min   supine alt BKFO x 1 min   supine SB DKTC x 1 min   supine SB TR x 1 min   PROM R/L hip   PPT x 1 min   HL add ball squeeze 3\" x 10   alt SAQ x 1 min   iso glut with low bridge x 1 min   RTB R/L iso clam x 10 ea   seated ankle pumps x 1 min   seated LAQ with ankle circled cw/ccw x 5 ea dir            Manual Therapy  Manual Therapy Activity 1: stretch R/L ADD, gluts, HS, piriformis    Therapeutic Activity  Therapeutic Activity 1: sit to stand from standard chair without UE - focus on ecc control x 10  Therapeutic Activity 2: lateral walking along plinth x 1 min                OP " EDUCATION:  Outpatient Education  Education Comment: Access Code: [email or web address]  URL: [email or web address]  Date: 01/30/2025  Prepared by: Lashell Carreon    Exercises  - Seated Heel Raise  - 3 x daily - 7 x weekly - 20 reps  - Seated Toe Raise  - 3 x daily - 7 x weekly - 20 reps  - Seated Ankle Circles  - 3 x daily - 7 x weekly - 20 reps    Assessment:  PT Assessment  Assessment Comment: Patient presents with generalized muscle weakness and fatigue.  Focus on strengthening all major muscle groups of the lower body.  Cues to work on eccentric control.  B LE edema present and affects the sensation in her feet. No increased pain during today's session.    Plan:  OP PT Plan  Treatment/Interventions: Cryotherapy, Education/ Instruction, Electrical stimulation, Gait training, Hot pack, Manual therapy, Neuromuscular re-education, Self care/ home management, Taping techniques, Therapeutic activities, Therapeutic exercises  PT Plan: Skilled PT (response to flexion, LQ strengthening (L sided weakness from stroke))  PT Frequency: 1 time per week  Duration: 8-10 weeks, reducing frequency as goals are met  Onset Date: 07/16/24  Certification Period Start Date: 01/16/25  Certification Period End Date: 03/16/25  Number of Treatments Authorized: 2/5  Rehab Potential: Good  Plan of Care Agreement: Patient    Goals:  Active       LBP/Gait       STG/LTG       Start:  01/16/25    Expected End:  03/27/25       STG  1)  Patient will improve Oswestry Low Back Disability Questionnaire by 10% in order to indicate a measurable improvement in daily function and ability to complete daily tasks in 4 weeks.  2) Patient will be able to complete ADLs with pain less than 4/10 in back in 4 weeks.  3) Patient will be able to ambulate x 150' with LRAD for short community ambulation and to reduce fall risk.   4) Patient will be independent with HEP to allow for continued improvement in daily tasks at home and in the community in 3  visits.   LTG  1) Patient will have >/=4+/5 strength in lateral hip musculature to aid in stability with ambulation on varied surfaces in community in 8 weeks  2) Patient will be able to perform proper squatting technique in order to prevent increased pain with daily tasks in 8 weeks.  3) Patient will be able to perform >2 seconds of L SLS on even ground in order to allow for safe ambulation and to demonstrate reduced fall risk within the community in 8 weeks.   4) Patient will be able to ambulate x 300' with LRAD for improved ability to navigate community ambulation.   5)  Patient will improve Oswestry Low Back Disability Questionnaire <20% in order to indicate a measurable improvement in daily function and ability to complete daily tasks in 4 weeks.                  Tatum Carreon, PTA

## 2025-02-04 ENCOUNTER — TREATMENT (OUTPATIENT)
Dept: PHYSICAL THERAPY | Facility: CLINIC | Age: 65
End: 2025-02-04
Payer: MEDICARE

## 2025-02-04 DIAGNOSIS — M54.50 LOW BACK PAIN, UNSPECIFIED BACK PAIN LATERALITY, UNSPECIFIED CHRONICITY, UNSPECIFIED WHETHER SCIATICA PRESENT: ICD-10-CM

## 2025-02-04 PROCEDURE — 97110 THERAPEUTIC EXERCISES: CPT | Mod: GP | Performed by: PHYSICAL THERAPIST

## 2025-02-04 ASSESSMENT — PAIN - FUNCTIONAL ASSESSMENT: PAIN_FUNCTIONAL_ASSESSMENT: 0-10

## 2025-02-04 ASSESSMENT — PAIN SCALES - GENERAL: PAINLEVEL_OUTOF10: 6

## 2025-02-04 NOTE — PROGRESS NOTES
"  Physical Therapy Treatment    Patient Name: Julia Carrion  MRN: [id number]  Today's Date: 2/4/2025  Time Calculation  Start Time: 1002  Stop Time: 1044  Time Calculation (min): 42 min  PT Therapeutic Procedures Time Entry  Therapeutic Exercise Time Entry: 36  Therapeutic Activity Time Entry: 5       Current Problem  Problem List Items Addressed This Visit             ICD-10-CM       Musculoskeletal and Injuries    Low back pain M54.50       Insurance:  Number of Treatments Authorized: 3/5  Certification Period Start Date: 01/16/25  Certification Period End Date: 03/16/25    Subjective   General  Reason for Referral: LBP/Gait  Referred By: YOJANA Johnston  Past Medical History Relevant to Rehab: transient ischemic attack (TIA), cerebral infarction with left sided weakness, breast cancer with chemo/radiation/double mastectomy  General Comment: Pt reports her legs continue to feel fatigued following exercises. Back pain still about the same. Doing exercises at home 2x/day - working on controlling the sit<>stand.    Performing HEP?: Yes    Precautions  Precautions  STEADI Fall Risk Score (The score of 4 or more indicates an increased risk of falling): 7  Precautions Comment: moderate fall risk  Pain  Pain Assessment: 0-10  0-10 (Numeric) Pain Score: 6  Pain Type: Chronic pain  Pain Location: Back  Pain Orientation: Left    Objective    Rollator on arrival and L LE steppage pattern      Treatments:  Therapeutic Exercise  Therapeutic Exercise Activity 1: Seated during subjective and exercise  Therapeutic Exercise Activity 2: Standing at HR: R,L hip abduction x 30 sec each  Therapeutic Exercise Activity 3: Standing at HR: R/L alt hip extension x 1 min  Therapeutic Exercise Activity 4: Heel raises standing small motion x 10 reps  Therapeutic Exercise Activity 5: Seated hip add squeeze yellow ball 3\" x 1 min  Therapeutic Exercise Activity 6: Seated march x 1 min  Therapeutic Exercise Activity 7: R/L alt march standing x 1 " "min  Therapeutic Exercise Activity 8: H/L glute sets 3\" x 1 min  Therapeutic Exercise Activity 9: GS + small bridge x 1 min  Therapeutic Exercise Activity 10: GTB R/L iso clam x 10 ea  Therapeutic Exercise Activity 11: GTB B H/L clams x 1 min  Therapeutic Exercise Activity 12: R/L alt ankle DF x 1 min    Therapeutic Activity  Therapeutic Activity 1: R/L lateral stepping at rail x 90 sec  Therapeutic Activity 2: sit to stand from standard chair without UE - focus on ecc control, 5 reps; low plinth x 5 reps    OP EDUCATION:  Outpatient Education  Education Comment: Access Code: [email or web address]  URL: [email or web address]  Date: 02/04/2025  Prepared by: Shayla Perez    Program Notes  *Chair behind for safety!     Exercises  - Standing Hip Abduction with Counter Support  - 1 x daily - 5 x weekly - 1-2 sets - 10 reps  - Standing Hip Extension with Counter Support  - 1 x daily - 5 x weekly - 1-2 sets - 10 reps  - Standing March with Counter Support  - 1 x daily - 5 x weekly - 1-2 sets - 10 reps  - Hooklying Isometric Clamshell  - 1 x daily - 5 x weekly - 1-2 sets - 10 reps    Assessment:  PT Assessment  Assessment Comment: Pt able to tolerate light standing exercises this date with rest breaks. Challenged with L hip flexion exercises. Improved eccentric control with sit<>stands, warned on post tx soreness. Updated HEP with good understanding.    Plan:  OP PT Plan  Treatment/Interventions: Cryotherapy, Education/ Instruction, Electrical stimulation, Gait training, Hot pack, Manual therapy, Neuromuscular re-education, Self care/ home management, Taping techniques, Therapeutic activities, Therapeutic exercises  PT Plan: Skilled PT (response to flexion, LQ strengthening (L sided weakness from stroke))  PT Frequency: 1 time per week  Duration: 8-10 weeks, reducing frequency as goals are met  Onset Date: 07/16/24  Certification Period Start Date: 01/16/25  Certification Period End Date: 03/16/25  Number of " Treatments Authorized: 3/5  Rehab Potential: Good  Plan of Care Agreement: Patient    Goals:  Active       LBP/Gait       STG/LTG       Start:  01/16/25    Expected End:  03/27/25       STG  1)  Patient will improve Oswestry Low Back Disability Questionnaire by 10% in order to indicate a measurable improvement in daily function and ability to complete daily tasks in 4 weeks.  2) Patient will be able to complete ADLs with pain less than 4/10 in back in 4 weeks.  3) Patient will be able to ambulate x 150' with LRAD for short community ambulation and to reduce fall risk.   4) Patient will be independent with HEP to allow for continued improvement in daily tasks at home and in the community in 3 visits.   LTG  1) Patient will have >/=4+/5 strength in lateral hip musculature to aid in stability with ambulation on varied surfaces in community in 8 weeks  2) Patient will be able to perform proper squatting technique in order to prevent increased pain with daily tasks in 8 weeks.  3) Patient will be able to perform >2 seconds of L SLS on even ground in order to allow for safe ambulation and to demonstrate reduced fall risk within the community in 8 weeks.   4) Patient will be able to ambulate x 300' with LRAD for improved ability to navigate community ambulation.   5)  Patient will improve Oswestry Low Back Disability Questionnaire <20% in order to indicate a measurable improvement in daily function and ability to complete daily tasks in 4 weeks.                  Shayla Perez, PT

## 2025-02-11 ENCOUNTER — TREATMENT (OUTPATIENT)
Dept: PHYSICAL THERAPY | Facility: CLINIC | Age: 65
End: 2025-02-11
Payer: MEDICARE

## 2025-02-11 DIAGNOSIS — M54.50 LOW BACK PAIN, UNSPECIFIED BACK PAIN LATERALITY, UNSPECIFIED CHRONICITY, UNSPECIFIED WHETHER SCIATICA PRESENT: Primary | ICD-10-CM

## 2025-02-11 PROCEDURE — 97110 THERAPEUTIC EXERCISES: CPT | Mod: GP,CQ

## 2025-02-11 PROCEDURE — 97112 NEUROMUSCULAR REEDUCATION: CPT | Mod: GP,CQ

## 2025-02-11 ASSESSMENT — PAIN - FUNCTIONAL ASSESSMENT: PAIN_FUNCTIONAL_ASSESSMENT: 0-10

## 2025-02-11 ASSESSMENT — PAIN SCALES - GENERAL: PAINLEVEL_OUTOF10: 6

## 2025-02-11 NOTE — PROGRESS NOTES
"  Physical Therapy Treatment    Patient Name: Julia Carrion  MRN: [id number]  Today's Date: 2/11/2025  Time Calculation  Start Time: 0920  Stop Time: 1002  Time Calculation (min): 42 min  PT Therapeutic Procedures Time Entry  Neuromuscular Re-Education Time Entry: 8  Therapeutic Exercise Time Entry: 34,      Current Problem  Problem List Items Addressed This Visit             ICD-10-CM    Low back pain - Primary M54.50       Insurance:  Number of Treatments Authorized: 4/5  Certification Period Start Date: 01/16/25  Certification Period End Date: 03/16/25      Subjective   General  Reason for Referral: LBP/Gait  Referred By: YOJANA Johnston  Past Medical History Relevant to Rehab: transient ischemic attack (TIA), cerebral infarction with left sided weakness, breast cancer with chemo/radiation/double mastectomy  General Comment: Patient c/o L LBP. Likes the sitting and standing exercises the best.    Performing HEP?: Yes    Precautions  Precautions  STEADI Fall Risk Score (The score of 4 or more indicates an increased risk of falling): 7  Precautions Comment: moderate fall risk  Pain  Pain Assessment: 0-10  0-10 (Numeric) Pain Score: 6  Pain Type: Chronic pain  Pain Location: Back  Pain Orientation: Left    Objective   Quite standing, weight shift right    Treatments:    Therapeutic Exercise  Therapeutic Exercise Activity 1: low incline gastroc stretch x 1 min  Therapeutic Exercise Activity 2: incline low heel raises x 10  Therapeutic Exercise Activity 3: seated SB RO x 10  Therapeutic Exercise Activity 4: seated SB RO side to side x 5 ea  Therapeutic Exercise Activity 5: sitting with lumbar roll - gentle thoracic extension x 10  Therapeutic Exercise Activity 6: seated GTB R/L iso hip abd x 10 ea  Therapeutic Exercise Activity 7: seated GTB B hip ABD 5\" x 10  Therapeutic Exercise Activity 8: seated ADD ball squeeze 5\" x 10  Therapeutic Exercise Activity 9: laretal walking along // bars x 1 min  Therapeutic Exercise " "Activity 10: standing hip hiking R/L with assist x 5 ea  Therapeutic Exercise Activity 11: HL LTR x 2 min  Therapeutic Exercise Activity 12: PPT x 1 min  Therapeutic Exercise Activity 13: glut set w/ low bridge x 10    Balance/Neuromuscular Re-Education  Balance/Neuromuscular Re-Education Activity 1: standing L QS with weight shift 5\" x 10  Balance/Neuromuscular Re-Education Activity 2: standing march with foot tap to 8\" stool with focus on L QS x 10                          OP EDUCATION:  Outpatient Education  Education Comment: consider making or purchasing a lumbar roll to use in your favorite chair.    Assessment:  PT Assessment  Assessment Comment: Encouraged patient to increase WB through her L LE with active quad engagement.  Performed sitting exercise with lumbar roll.  Patient felt sitting with the lumbar roll felt good.    Plan:  OP PT Plan  Treatment/Interventions: Cryotherapy, Education/ Instruction, Electrical stimulation, Gait training, Hot pack, Manual therapy, Neuromuscular re-education, Self care/ home management, Taping techniques, Therapeutic activities, Therapeutic exercises  PT Plan: Skilled PT (response to flexion, LQ strengthening (L sided weakness from stroke))  PT Frequency: 1 time per week  Duration: 8-10 weeks, reducing frequency as goals are met  Onset Date: 07/16/24  Certification Period Start Date: 01/16/25  Certification Period End Date: 03/16/25  Number of Treatments Authorized: 4/5  Rehab Potential: Good  Plan of Care Agreement: Patient    Goals:  Active       LBP/Gait       STG/LTG       Start:  01/16/25    Expected End:  03/27/25       STG  1)  Patient will improve Oswestry Low Back Disability Questionnaire by 10% in order to indicate a measurable improvement in daily function and ability to complete daily tasks in 4 weeks.  2) Patient will be able to complete ADLs with pain less than 4/10 in back in 4 weeks.  3) Patient will be able to ambulate x 150' with LRAD for short community " ambulation and to reduce fall risk.   4) Patient will be independent with HEP to allow for continued improvement in daily tasks at home and in the community in 3 visits.   LTG  1) Patient will have >/=4+/5 strength in lateral hip musculature to aid in stability with ambulation on varied surfaces in community in 8 weeks  2) Patient will be able to perform proper squatting technique in order to prevent increased pain with daily tasks in 8 weeks.  3) Patient will be able to perform >2 seconds of L SLS on even ground in order to allow for safe ambulation and to demonstrate reduced fall risk within the community in 8 weeks.   4) Patient will be able to ambulate x 300' with LRAD for improved ability to navigate community ambulation.   5)  Patient will improve Oswestry Low Back Disability Questionnaire <20% in order to indicate a measurable improvement in daily function and ability to complete daily tasks in 4 weeks.                  Tatum Carreon, PTA

## 2025-02-19 ENCOUNTER — TREATMENT (OUTPATIENT)
Dept: PHYSICAL THERAPY | Facility: CLINIC | Age: 65
End: 2025-02-19
Payer: MEDICARE

## 2025-02-19 DIAGNOSIS — M54.50 LOW BACK PAIN, UNSPECIFIED BACK PAIN LATERALITY, UNSPECIFIED CHRONICITY, UNSPECIFIED WHETHER SCIATICA PRESENT: ICD-10-CM

## 2025-02-19 PROCEDURE — 97530 THERAPEUTIC ACTIVITIES: CPT | Mod: GP | Performed by: PHYSICAL THERAPIST

## 2025-02-19 PROCEDURE — 97110 THERAPEUTIC EXERCISES: CPT | Mod: GP | Performed by: PHYSICAL THERAPIST

## 2025-02-19 PROCEDURE — 97112 NEUROMUSCULAR REEDUCATION: CPT | Mod: GP | Performed by: PHYSICAL THERAPIST

## 2025-02-19 ASSESSMENT — PAIN SCALES - GENERAL: PAINLEVEL_OUTOF10: 7

## 2025-02-19 ASSESSMENT — PAIN - FUNCTIONAL ASSESSMENT: PAIN_FUNCTIONAL_ASSESSMENT: 0-10

## 2025-02-19 NOTE — PROGRESS NOTES
Physical Therapy Progress Note    Patient Name: Julia Carrion  MRN: [id number]  Today's Date: 2/19/2025  Time Calculation  Start Time: 1202  Stop Time: 1244  Time Calculation (min): 42 min  PT Therapeutic Procedures Time Entry  Neuromuscular Re-Education Time Entry: 21  Therapeutic Exercise Time Entry: 11  Therapeutic Activity Time Entry: 8       Current Problem  Problem List Items Addressed This Visit             ICD-10-CM       Musculoskeletal and Injuries    Low back pain M54.50       Insurance:  Number of Treatments Authorized: 5/?  Certification Period Start Date: 01/16/25  Certification Period End Date:  (??)    Subjective   General  Reason for Referral: LBP/Gait  Referred By: YOJANA Johnston  Past Medical History Relevant to Rehab: transient ischemic attack (TIA), cerebral infarction with left sided weakness, breast cancer with chemo/radiation/double mastectomy  General Comment: Pt reports she bought lumbar roll and has been using it all the time. Thinks it is making her back sore but feels really good while she's doing it. Very compliant with HEP.    Performing HEP?: Yes    Precautions  Precautions  STEADI Fall Risk Score (The score of 4 or more indicates an increased risk of falling): 7  Precautions Comment: moderate fall risk  Pain  Pain Assessment: 0-10  0-10 (Numeric) Pain Score: 7  Pain Type: Chronic pain  Pain Location: Back  Pain Orientation: Left    Objective   Pt presents with rollator, L hip steppage pattern with good foot clearance      AROM Lumbar in % of normal   Flexion 66% pain   Extension 50% pain    R lateral flexion 75%  L lateral flexion 66%     R Hip flex 5/5     ER 4+/5     IR 4+/5     Abd 4/5     Add 5/5     L Hip flex 4/5     ER 4+/5     IR 4/5     Abd 4+/5     Add 4+/5      TUG with rollator: 14.3 sec, 13.2 sec    R/L mod tandem 28 sec  L/R mod tandem 33 sec     L/R tandem 11 sec  R/L tandem 8 sec    R SLS 3 sec   L SLS 3 sec  Outcome Measures:  Other Measures  5x Sit to Stand:  "15.8  Oswestry Disablity Index (MARNI): 38    Treatments:  Therapeutic Exercise  Therapeutic Exercise Activity 1: SciFit LE manual x 5 minutes  Therapeutic Exercise Activity 2: gastroc stretch slantboard x 1 min  Therapeutic Exercise Activity 3: incline low heel raises x 10  Therapeutic Exercise Activity 4: lateral walking along // bars x 1 min  Therapeutic Exercise Activity 5: R/L diagonal hip extension x 10 reps each  Therapeutic Exercise Activity 6: *tests and measures above    Therapeutic Activity  Therapeutic Activity 1: Sit<>stand without UE support 5 reps x 3  Therapeutic Activity 2: R/L alt 4\" fwd step up x 1 min each  Therapeutic Activity 3: R/L lateral 4\" step up/over x 1 min with B UE support    Balance/Neuromuscular Re-Education  Balance/Neuromuscular Re-Education Activity 1: R/L alt march with LE tap to 8\" step x 1 min  Balance/Neuromuscular Re-Education Activity 2: R,L SLS x 30 sec each  Balance/Neuromuscular Re-Education Activity 3: R/L alt fwd airex step up x 1 min  Balance/Neuromuscular Re-Education Activity 4: R,L lateral airex step ups x 1 min each  Balance/Neuromuscular Re-Education Activity 5: R/L lateral walking airex beam x 1 min with light UE support  Balance/Neuromuscular Re-Education Activity 6: R/L mod tandem ground x 40 sec each  Balance/Neuromuscular Re-Education Activity 7: R/L tandem ground x 30 sec each    OP EDUCATION:  Outpatient Education  Education Comment: Reviewed    Assessment:   Patient is 64 year old evaluated and treated x 5 visits to physical therapy with signs and symptoms consistent with chronic LBP and L sided weakness since CVA in 2021. Pt has shown improved functional mobility and able to stand for short periods unsupported to demonstrate improved balance. Some improvements with pain levels. Continues with balance and strength deficits, as well as need for rollator for mobility. Pt would benefit from continued skilled physical therapy in order to address the stated " deficits and return to daily tasks with reduced pain and improved function.     Plan:  OP PT Plan  Treatment/Interventions: Cryotherapy, Education/ Instruction, Electrical stimulation, Gait training, Hot pack, Manual therapy, Neuromuscular re-education, Self care/ home management, Taping techniques, Therapeutic activities, Therapeutic exercises  PT Plan: Skilled PT (response to flexion, LQ strengthening (L sided weakness from stroke))  PT Frequency: 1 time per week  Duration: 8 MORE weeks or 8 visits, reducing frequency as goals are met  Onset Date: 07/16/24  Certification Period Start Date: 01/16/25  Certification Period End Date:  (??)  Number of Treatments Authorized: 5/?  Rehab Potential: Good  Plan of Care Agreement: Patient    Goals:  Active       LBP/Gait       STG/LTG (Progressing)       Start:  01/16/25    Expected End:  03/27/25       STG  1)  Patient will improve Oswestry Low Back Disability Questionnaire by 10% in order to indicate a measurable improvement in daily function and ability to complete daily tasks in 4 weeks. MET  2) Patient will be able to complete ADLs with pain less than 4/10 in back in 4 weeks. PART MET  3) Patient will be able to ambulate x 150' with LRAD for short community ambulation and to reduce fall risk. MET  4) Patient will be independent with HEP to allow for continued improvement in daily tasks at home and in the community in 3 visits. MET  LTG  1) Patient will have >/=4+/5 strength in lateral hip musculature to aid in stability with ambulation on varied surfaces in community in 8 weeks. IN PROGRESS  2) Patient will be able to perform proper squatting technique in order to prevent increased pain with daily tasks in 8 weeks. IN PROGRESS  3) Patient will be able to perform >2 seconds of L SLS on even ground in order to allow for safe ambulation and to demonstrate reduced fall risk within the community in 8 weeks. MET  4) Patient will be able to ambulate x 300' with LRAD for  improved ability to navigate community ambulation. MET  5)  Patient will improve Oswestry Low Back Disability Questionnaire <20% in order to indicate a measurable improvement in daily function and ability to complete daily tasks in 4 weeks. IN PROGRESS                  Shayla Perez, PT

## 2025-03-04 ENCOUNTER — TREATMENT (OUTPATIENT)
Dept: PHYSICAL THERAPY | Facility: CLINIC | Age: 65
End: 2025-03-04
Payer: MEDICARE

## 2025-03-04 DIAGNOSIS — M54.50 LOW BACK PAIN, UNSPECIFIED BACK PAIN LATERALITY, UNSPECIFIED CHRONICITY, UNSPECIFIED WHETHER SCIATICA PRESENT: Primary | ICD-10-CM

## 2025-03-04 PROCEDURE — 97530 THERAPEUTIC ACTIVITIES: CPT | Mod: GP,CQ

## 2025-03-04 PROCEDURE — 97110 THERAPEUTIC EXERCISES: CPT | Mod: GP,CQ

## 2025-03-04 PROCEDURE — 97112 NEUROMUSCULAR REEDUCATION: CPT | Mod: GP,CQ

## 2025-03-04 ASSESSMENT — PAIN SCALES - GENERAL: PAINLEVEL_OUTOF10: 8

## 2025-03-04 ASSESSMENT — PAIN - FUNCTIONAL ASSESSMENT: PAIN_FUNCTIONAL_ASSESSMENT: 0-10

## 2025-03-04 NOTE — PROGRESS NOTES
"  Physical Therapy Treatment    Patient Name: Julia Carrion  MRN: [id number]  Today's Date: 3/4/2025  Time Calculation  Start Time: 1052  Stop Time: 1140  Time Calculation (min): 48 min  PT Therapeutic Procedures Time Entry  Neuromuscular Re-Education Time Entry: 15  Therapeutic Exercise Time Entry: 18  Therapeutic Activity Time Entry: 15,      Current Problem  Problem List Items Addressed This Visit             ICD-10-CM    Low back pain - Primary M54.50       Insurance:  Number of Treatments Authorized: 1/4  Certification Period Start Date: 02/24/25  Certification Period End Date: 03/25/25      Subjective   General  Reason for Referral: LBP/Gait  Referred By: YOJANA Johnston  Past Medical History Relevant to Rehab: transient ischemic attack (TIA), cerebral infarction with left sided weakness, breast cancer with chemo/radiation/double mastectomy  General Comment: Patient reports her L LB pain is elevated today.    Performing HEP?: Yes - mostly seated exercises     Precautions  Precautions  STEADI Fall Risk Score (The score of 4 or more indicates an increased risk of falling): 7  Precautions Comment: moderate fall risk  Pain  Pain Assessment: 0-10  0-10 (Numeric) Pain Score: 8  Pain Type: Chronic pain  Pain Location: Back  Pain Orientation: Left    Objective       Treatments:    Therapeutic Exercise  Therapeutic Exercise Activity 1: HL PPT x 1 min  Therapeutic Exercise Activity 2: LTR x 1 min  Therapeutic Exercise Activity 3: alt BKFO x 1 min  Therapeutic Exercise Activity 4: bridging 2 x 10  Therapeutic Exercise Activity 5: SciFit Man L2 x 5 min  Therapeutic Exercise Activity 6: seated SB RO x 10  Therapeutic Exercise Activity 7: seated SB RO side to side x 5 ea  Therapeutic Exercise Activity 8: gastroc stretch slantboard x 1 min  Therapeutic Exercise Activity 9: incline low heel raises x 10  Therapeutic Exercise Activity 10: standing alt hip ext x 10 ea  Therapeutic Exercise Activity 11: standing alt hip abd with 2\" " "pause x 10 ea    Balance/Neuromuscular Re-Education  Balance/Neuromuscular Re-Education Activity 1: lateral walking along airex beam - slowly with minimal UE assist 2 x 1 min  Balance/Neuromuscular Re-Education Activity 2: FWD/BWD Tandem walk on Airex beam with 1 UE assist  Balance/Neuromuscular Re-Education Activity 3: standing alt foot tap to 4\" step with pause without UE x 1 min  Balance/Neuromuscular Re-Education Activity 4: tilt board S/S controlled taps 2-1-0 UE assist x 2 min total  Balance/Neuromuscular Re-Education Activity 5: tilt board balance x 1 min  Balance/Neuromuscular Re-Education Activity 6: alt R/L step and pause without UE x 10 ea         Therapeutic Activity  Therapeutic Activity 1: Sit<>stand without UE support 5 reps x 3  Therapeutic Activity 2: R/L 4\" FWD step up w/o UE x 10 ea  Therapeutic Activity 3: 4\" lateral step over without UE x 1 min  Therapeutic Activity 4: ambulate without rollator slowly with control x 100' - encourage L heel strike                OP EDUCATION:       Assessment:  PT Assessment  Assessment Comment: Patient arrived with an increase in her L LB pain today.  Decreased with gentle ROM and stretching exercises.  Much focus on standing stabilization without UE assist today.  Doing well.    Plan:  OP PT Plan  Treatment/Interventions: Cryotherapy, Education/ Instruction, Electrical stimulation, Gait training, Hot pack, Manual therapy, Neuromuscular re-education, Self care/ home management, Taping techniques, Therapeutic activities, Therapeutic exercises  PT Plan: Skilled PT (response to flexion, LQ strengthening (L sided weakness from stroke))  PT Frequency: 1 time per week  Duration: 8 MORE weeks or 8 visits, reducing frequency as goals are met  Onset Date: 07/16/24  Certification Period Start Date: 02/24/25  Certification Period End Date: 03/25/25  Number of Treatments Authorized: 1/4  Rehab Potential: Good  Plan of Care Agreement: Patient    Goals:  Active       LBP/Gait "       STG/LTG (Progressing)       Start:  01/16/25    Expected End:  03/27/25       STG  1)  Patient will improve Oswestry Low Back Disability Questionnaire by 10% in order to indicate a measurable improvement in daily function and ability to complete daily tasks in 4 weeks. MET  2) Patient will be able to complete ADLs with pain less than 4/10 in back in 4 weeks. PART MET  3) Patient will be able to ambulate x 150' with LRAD for short community ambulation and to reduce fall risk. MET  4) Patient will be independent with HEP to allow for continued improvement in daily tasks at home and in the community in 3 visits. MET  LTG  1) Patient will have >/=4+/5 strength in lateral hip musculature to aid in stability with ambulation on varied surfaces in community in 8 weeks. IN PROGRESS  2) Patient will be able to perform proper squatting technique in order to prevent increased pain with daily tasks in 8 weeks. IN PROGRESS  3) Patient will be able to perform >2 seconds of L SLS on even ground in order to allow for safe ambulation and to demonstrate reduced fall risk within the community in 8 weeks. MET  4) Patient will be able to ambulate x 300' with LRAD for improved ability to navigate community ambulation. MET  5)  Patient will improve Oswestry Low Back Disability Questionnaire <20% in order to indicate a measurable improvement in daily function and ability to complete daily tasks in 4 weeks. IN PROGRESS                  Tatum Carreon, PTA

## 2025-03-12 ENCOUNTER — TREATMENT (OUTPATIENT)
Dept: PHYSICAL THERAPY | Facility: CLINIC | Age: 65
End: 2025-03-12
Payer: MEDICARE

## 2025-03-12 DIAGNOSIS — M54.50 LOW BACK PAIN, UNSPECIFIED BACK PAIN LATERALITY, UNSPECIFIED CHRONICITY, UNSPECIFIED WHETHER SCIATICA PRESENT: ICD-10-CM

## 2025-03-12 PROCEDURE — 97110 THERAPEUTIC EXERCISES: CPT | Mod: GP | Performed by: PHYSICAL THERAPIST

## 2025-03-12 PROCEDURE — 97530 THERAPEUTIC ACTIVITIES: CPT | Mod: GP | Performed by: PHYSICAL THERAPIST

## 2025-03-12 PROCEDURE — 97112 NEUROMUSCULAR REEDUCATION: CPT | Mod: GP | Performed by: PHYSICAL THERAPIST

## 2025-03-12 ASSESSMENT — PAIN - FUNCTIONAL ASSESSMENT: PAIN_FUNCTIONAL_ASSESSMENT: 0-10

## 2025-03-12 ASSESSMENT — PAIN SCALES - GENERAL: PAINLEVEL_OUTOF10: 7

## 2025-03-12 NOTE — PROGRESS NOTES
Physical Therapy Treatment    Patient Name: Julia Carrion  MRN: [id number]  Today's Date: 3/12/2025  Time Calculation  Start Time: 1034  Stop Time: 1115  Time Calculation (min): 41 min  PT Therapeutic Procedures Time Entry  Neuromuscular Re-Education Time Entry: 19  Therapeutic Exercise Time Entry: 10  Therapeutic Activity Time Entry: 11       Current Problem  Problem List Items Addressed This Visit             ICD-10-CM       Musculoskeletal and Injuries    Low back pain M54.50       Insurance:  Number of Treatments Authorized: 2/4  Certification Period Start Date: 02/24/25  Certification Period End Date: 03/25/25    Subjective   General  Reason for Referral: LBP/Gait  Referred By: YOJANA Johnston  Past Medical History Relevant to Rehab: transient ischemic attack (TIA), cerebral infarction with left sided weakness, breast cancer with chemo/radiation/double mastectomy  General Comment: Pt reports she's still having a lot of back pain. Not all the time but shoots on/off. Notes worse with sitting for a long time. Does note she's able to bend over better than she was. Able to cook a little more. Notes she didn't sleep well last night.    Performing HEP?: Yes    Precautions  Precautions  STEADI Fall Risk Score (The score of 4 or more indicates an increased risk of falling): 7  Precautions Comment: moderate fall risk  Pain  Pain Assessment: 0-10  0-10 (Numeric) Pain Score: 7  Pain Type: Chronic pain  Pain Location: Back  Pain Orientation: Left    Objective    Ambulating with fast pace using rollator on arrival  R SLS 3 sec   L SLS 2 sec    Treatments:  Therapeutic Exercise  Therapeutic Exercise Activity 1: SciFit Man L2 x 6 min  Therapeutic Exercise Activity 2: Heel raises x 1 min  Therapeutic Exercise Activity 3: seated SB RO flexion x 1 min  Therapeutic Exercise Activity 4: seated SB RO side to side x 1 min    Therapeutic Activity  Therapeutic Activity 1: lateral walking along // bars x 1 min  Therapeutic Activity 2: R,L  "fwd/bkwd steps with opp LE in place x 1 min without support  Therapeutic Activity 3: Large steps (side of // bar) without UE support, followed by bkwd stepping x 1 min - SBA  Therapeutic Activity 4: R,L fwd 6\" step ups with L UE support x 1 min  Therapeutic Activity 5: R/L lateral 6\" step ups/over x 1 min  Therapeutic Activity 6: Ambulating out of clinic without rollator - SBA for safety - cues for slow pattern    Balance/Neuromuscular Re-Education  Balance/Neuromuscular Re-Education Activity 1: R/L alt march to 8\" step without UE support - CGA for safety  Balance/Neuromuscular Re-Education Activity 2: R/L alt march on airex x 1 min  Balance/Neuromuscular Re-Education Activity 3: R/L step up/over airex without UE support x 1 min  Balance/Neuromuscular Re-Education Activity 4: R/L tandem airex x 1 min  Balance/Neuromuscular Re-Education Activity 5: L/R tandem airex x 1 min  Balance/Neuromuscular Re-Education Activity 6: R/L lateral step up/over airex pad without UE support x 1 min    OP EDUCATION:  Outpatient Education  Education Comment: Reviewed    Assessment:  PT Assessment  Assessment Comment: Pt tolerated full exercise session well, despite some increase in L LBP. Challenged with balance exercises, however consistent ankle strategies with self correct for small losses of balance throughout. Low tolerance to SLS with UE support needed. Able to ambulate out of clinic without rollator with good stability - SBA for safety - needs cues for slower walking.    Plan:  OP PT Plan  Treatment/Interventions: Cryotherapy, Education/ Instruction, Electrical stimulation, Gait training, Hot pack, Manual therapy, Neuromuscular re-education, Self care/ home management, Taping techniques, Therapeutic activities, Therapeutic exercises  PT Plan: Skilled PT (response to flexion, LQ strengthening (L sided weakness from stroke))  PT Frequency: 1 time per week  Duration: 8 MORE weeks or 8 visits, reducing frequency as goals are " met  Onset Date: 07/16/24  Certification Period Start Date: 02/24/25  Certification Period End Date: 03/25/25  Number of Treatments Authorized: 2/4  Rehab Potential: Good  Plan of Care Agreement: Patient    Goals:  Active       LBP/Gait       STG/LTG (Progressing)       Start:  01/16/25    Expected End:  03/27/25       STG  1)  Patient will improve Oswestry Low Back Disability Questionnaire by 10% in order to indicate a measurable improvement in daily function and ability to complete daily tasks in 4 weeks. MET  2) Patient will be able to complete ADLs with pain less than 4/10 in back in 4 weeks. PART MET  3) Patient will be able to ambulate x 150' with LRAD for short community ambulation and to reduce fall risk. MET  4) Patient will be independent with HEP to allow for continued improvement in daily tasks at home and in the community in 3 visits. MET  LTG  1) Patient will have >/=4+/5 strength in lateral hip musculature to aid in stability with ambulation on varied surfaces in community in 8 weeks. IN PROGRESS  2) Patient will be able to perform proper squatting technique in order to prevent increased pain with daily tasks in 8 weeks. IN PROGRESS  3) Patient will be able to perform >2 seconds of L SLS on even ground in order to allow for safe ambulation and to demonstrate reduced fall risk within the community in 8 weeks. MET  4) Patient will be able to ambulate x 300' with LRAD for improved ability to navigate community ambulation. MET  5)  Patient will improve Oswestry Low Back Disability Questionnaire <20% in order to indicate a measurable improvement in daily function and ability to complete daily tasks in 4 weeks. IN PROGRESS                  Shayla Perez, PT

## 2025-03-14 ENCOUNTER — APPOINTMENT (OUTPATIENT)
Dept: PRIMARY CARE | Facility: CLINIC | Age: 65
End: 2025-03-14
Payer: MEDICARE

## 2025-03-19 ENCOUNTER — APPOINTMENT (OUTPATIENT)
Dept: PHYSICAL THERAPY | Facility: CLINIC | Age: 65
End: 2025-03-19
Payer: MEDICARE

## 2025-03-19 DIAGNOSIS — M54.50 LOW BACK PAIN, UNSPECIFIED BACK PAIN LATERALITY, UNSPECIFIED CHRONICITY, UNSPECIFIED WHETHER SCIATICA PRESENT: Primary | ICD-10-CM

## 2025-03-19 NOTE — PROGRESS NOTES
Physical Therapy                 Therapy Communication Note    Patient Name: Julia Carrion  MRN: [id number]  Department:   Room: Room/bed info not found  Today's Date: 3/19/2025     Discipline: Physical Therapy          Missed Visit Reason:     Missed Time: Cancel    Comment:

## 2025-03-24 ENCOUNTER — TREATMENT (OUTPATIENT)
Dept: PHYSICAL THERAPY | Facility: CLINIC | Age: 65
End: 2025-03-24
Payer: MEDICARE

## 2025-03-24 DIAGNOSIS — M54.50 LOW BACK PAIN, UNSPECIFIED BACK PAIN LATERALITY, UNSPECIFIED CHRONICITY, UNSPECIFIED WHETHER SCIATICA PRESENT: ICD-10-CM

## 2025-03-24 PROCEDURE — 97110 THERAPEUTIC EXERCISES: CPT | Mod: GP | Performed by: PHYSICAL THERAPIST

## 2025-03-24 PROCEDURE — 97112 NEUROMUSCULAR REEDUCATION: CPT | Mod: GP | Performed by: PHYSICAL THERAPIST

## 2025-03-24 ASSESSMENT — PAIN - FUNCTIONAL ASSESSMENT: PAIN_FUNCTIONAL_ASSESSMENT: 0-10

## 2025-03-24 ASSESSMENT — PAIN SCALES - GENERAL: PAINLEVEL_OUTOF10: 5 - MODERATE PAIN

## 2025-03-24 NOTE — PROGRESS NOTES
Physical Therapy Progress Note    Patient Name: Julia Carrion  MRN: [id number]  Today's Date: 3/24/2025  Time Calculation  Start Time: 1031  Stop Time: 1112  Time Calculation (min): 41 min  PT Therapeutic Procedures Time Entry  Neuromuscular Re-Education Time Entry: 9  Therapeutic Exercise Time Entry: 23  Therapeutic Activity Time Entry: 8       Current Problem  Problem List Items Addressed This Visit             ICD-10-CM       Musculoskeletal and Injuries    Low back pain M54.50       Insurance:  Number of Treatments Authorized: 3/? (8 total visits)  Certification Period Start Date: 03/24/25  Certification Period End Date: 03/25/25 (??)    Subjective   General  Reason for Referral: LBP/Gait  Referred By: YOJANA Johnston  Past Medical History Relevant to Rehab: transient ischemic attack (TIA), cerebral infarction with left sided weakness, breast cancer with chemo/radiation/double mastectomy  General Comment: Pt reports therapy has been helping her a lot. Notes she's able to stand and get around a little better. Pt notes she's more confident than she used to be with things around the house.    Performing HEP?: Yes - was unable d/t sickness part of last week     Precautions  Precautions  STEADI Fall Risk Score (The score of 4 or more indicates an increased risk of falling): 7  Precautions Comment: moderate fall risk  Pain  Pain Assessment: 0-10  0-10 (Numeric) Pain Score: 5 - Moderate pain  Pain Type: Chronic pain  Pain Location: Back  Pain Orientation: Left    Objective   AROM Lumbar in % of normal   Flexion 100% discomfort  Extension 50% pain    R lateral flexion 75%  L lateral flexion 66%      R Hip flex 5/5     ER 4+/5     IR 4+/5     Abd 4/5     Add 5/5     L Hip flex 4+/5     ER 4+/5     IR 4/5     Abd 4+/5     Add 4+/5     R SLS 3 sec   L SLS 3 sec    L/R tandem 25 sec  R/L tandem 60 sec    Outcome Measures:  Other Measures  5x Sit to Stand: 10.44  Oswestry Disablity Index (MARNI): 30  TUG with rollator:  13.2  sec    Treatments:  Therapeutic Exercise  Therapeutic Exercise Activity 1: SciFit Man L2 x 6 min  Therapeutic Exercise Activity 2: Heel raises x 1 min  Therapeutic Exercise Activity 3: R/L alt march without UE support x 1 min  Therapeutic Exercise Activity 4: R,L hip abduction x 30 sec each with UE support  Therapeutic Exercise Activity 5: R,L hip extension without UE support x 30 sec each  Therapeutic Exercise Activity 6: R,L hip diagonal extension x 30 sec each  Therapeutic Exercise Activity 7: AROM/strength above    Therapeutic Activity  Therapeutic Activity 1: sit<>stands without UE support 5 reps, x 3  Therapeutic Activity 2: R/L lateral walking x 1 min  Therapeutic Activity 3: Large steps (side of // bar) without UE support, followed by bkwd stepping x 1 min - SBA  Therapeutic Activity 4: Ambulating out of clinic without rollator - SBA for safety - cues for slow pattern    Balance/Neuromuscular Re-Education  Balance/Neuromuscular Re-Education Activity 1: L/R tandem airex x 1 min  Balance/Neuromuscular Re-Education Activity 2: R/L tandem airex x 1 min  Balance/Neuromuscular Re-Education Activity 3: R,L SLS x 30 sec each  Balance/Neuromuscular Re-Education Activity 4: R/L lateral walking airex x 1 min    OP EDUCATION:  Outpatient Education  Education Comment: Reviewed exercises, importance of endurance/stamina    Assessment:   Patient is 65 year old evaluated and treated x 8 visits to physical therapy with signs and symptoms consistent with chronic LBP and L sided weakness since CVA in 2021. Pt has shown improved functional mobility, able to stand and walk for longer periods unsupported safely.  Tandem balance significantly improved. Continues to be challenged with SL balance and prolonged standing/walking. Pain continues to fluctuate in her low back but has improved since therapy. Pt would benefit from continued skilled physical therapy in order to address the stated deficits and return to daily tasks with  reduced pain and improved function.     Plan:  OP PT Plan  Treatment/Interventions: Cryotherapy, Education/ Instruction, Electrical stimulation, Gait training, Hot pack, Manual therapy, Neuromuscular re-education, Self care/ home management, Taping techniques, Therapeutic activities, Therapeutic exercises  PT Plan: Skilled PT (response to flexion, LQ strengthening (L sided weakness from stroke))  PT Frequency: 1 time per week  Duration: 6 MORE weeks or 6 visits, reducing frequency as goals are met  Onset Date: 07/16/24  Certification Period Start Date: 03/24/25  Certification Period End Date: 03/25/25 (??)  Number of Treatments Authorized: 3/? (8 total visits)  Rehab Potential: Good  Plan of Care Agreement: Patient    Goals:  Active       LBP/Gait       STG/LTG (Progressing)       Start:  01/16/25    Expected End:  04/21/25       STG  1)  Patient will improve Oswestry Low Back Disability Questionnaire by 10% in order to indicate a measurable improvement in daily function and ability to complete daily tasks in 4 weeks. MET  2) Patient will be able to complete ADLs with pain less than 4/10 in back in 4 weeks. PART MET  3) Patient will be able to ambulate x 150' with LRAD for short community ambulation and to reduce fall risk. MET  4) Patient will be independent with HEP to allow for continued improvement in daily tasks at home and in the community in 3 visits. MET  LTG  1) Patient will have >/=4+/5 strength in lateral hip musculature to aid in stability with ambulation on varied surfaces in community in 8 weeks. IN PROGRESS  2) Patient will be able to perform proper squatting technique in order to prevent increased pain with daily tasks in 8 weeks. MET  3) Patient will be able to perform >2 seconds of L SLS on even ground in order to allow for safe ambulation and to demonstrate reduced fall risk within the community in 8 weeks. MET  4) Patient will be able to ambulate x 300' with LRAD for improved ability to navigate  community ambulation. MET  5)  Patient will improve Oswestry Low Back Disability Questionnaire <20% in order to indicate a measurable improvement in daily function and ability to complete daily tasks in 4 weeks. IN PROGRESS  6) Pt will be able to ambulate 80' during session with supervision without assistive device to demo improved gait and balance by discharge. MET                   Shayla Perez, PT

## 2025-04-07 NOTE — PROGRESS NOTES
"  Physical Therapy Treatment    Patient Name: Julia Carrion  MRN: [id number]  Today's Date: 4/8/2025  Time Calculation  Start Time: 1402  Stop Time: 1442  Time Calculation (min): 40 min  PT Therapeutic Procedures Time Entry  Therapeutic Exercise Time Entry: 29  Therapeutic Activity Time Entry: 10       Current Problem  Problem List Items Addressed This Visit             ICD-10-CM       Musculoskeletal and Injuries    Low back pain M54.50       Insurance:  Number of Treatments Authorized: 1/4 (8 total visits in prior auths)  Certification Period Start Date: 03/27/25  Certification Period End Date: 04/25/25    Subjective   General  Reason for Referral: LBP/Gait  Referred By: YOJANA Johnston  Past Medical History Relevant to Rehab: transient ischemic attack (TIA), cerebral infarction with left sided weakness, breast cancer with chemo/radiation/double mastectomy  General Comment: Pt reports she's had a little more pain in her back. Did get a new mattress which she thinks is making her back pain worse. Pretty tired today.    Performing HEP?: Yes    Precautions  Precautions  STEADI Fall Risk Score (The score of 4 or more indicates an increased risk of falling): 7  Precautions Comment: moderate fall risk  Pain  Pain Assessment: 0-10  0-10 (Numeric) Pain Score: 6  Pain Type: Chronic pain  Pain Location: Back  Pain Orientation: Left    Objective    Ambulating safely during small walks in session without AD  Rollator on arrival     AROM Lumbar in % of normal   Flexion 100% discomfort  Extension 50% pain    R lateral flexion 75%  L lateral flexion 66%     Treatments:  Therapeutic Exercise  Therapeutic Exercise Activity 1: SciFit hills L3 x 6 min  Therapeutic Exercise Activity 2: R/L alt march with LE tap to 8\" step x 1 min  Therapeutic Exercise Activity 3: Heel raises x 1 min  Therapeutic Exercise Activity 4: R,L hip abduction x 30 sec each with UE support  Therapeutic Exercise Activity 5: R,L hip diagonal extension x 30 sec " "each  Therapeutic Exercise Activity 6: R,L hip extension without UE support x 30 sec each  Therapeutic Exercise Activity 7: H/L glute sets 3\" x 1 min  Therapeutic Exercise Activity 8: Bridges x 5 reps  Therapeutic Exercise Activity 9: R/L SKTC 5\" x 2 min  Therapeutic Exercise Activity 10: DKTC x 30 sec  Therapeutic Exercise Activity 11: Lumbar rocking x 1 min    Therapeutic Activity  Therapeutic Activity 1: Mini squats with UE support x 1 min  Therapeutic Activity 2: R/L lateral walking x 2 min  Therapeutic Activity 3: R/L fwd 4\" step ups x 1 min each  Therapeutic Activity 4: R/L lateral 4\" step up and over x 1 min with UE support  Therapeutic Activity 5: Ambulating out of clinic without rollator - SBA for safety - cues for slow pattern    OP EDUCATION:  Outpatient Education  Education Comment: Reviewed    Assessment:  PT Assessment  Assessment Comment: Pt overall showing good progress under POC, however increased LBP this visit. Encouraged mvmts and exercises when painful with good understanding and improved symptoms during session. Challenged with L>R lateral and fwd step ups.    Plan:  OP PT Plan  Treatment/Interventions: Cryotherapy, Education/ Instruction, Electrical stimulation, Gait training, Hot pack, Manual therapy, Neuromuscular re-education, Self care/ home management, Taping techniques, Therapeutic activities, Therapeutic exercises  PT Plan: Skilled PT (response to flexion, LQ strengthening (L sided weakness from stroke))  PT Frequency: 1 time per week  Duration: 6 MORE weeks or 6 visits, reducing frequency as goals are met  Onset Date: 07/16/24  Certification Period Start Date: 03/27/25  Certification Period End Date: 04/25/25  Number of Treatments Authorized: 1/4 (8 total visits in prior auths)  Rehab Potential: Good  Plan of Care Agreement: Patient    Goals:  Active       LBP/Gait       STG/LTG (Progressing)       Start:  01/16/25    Expected End:  04/21/25       STG  1)  Patient will improve Oswestry " Low Back Disability Questionnaire by 10% in order to indicate a measurable improvement in daily function and ability to complete daily tasks in 4 weeks. MET  2) Patient will be able to complete ADLs with pain less than 4/10 in back in 4 weeks. PART MET  3) Patient will be able to ambulate x 150' with LRAD for short community ambulation and to reduce fall risk. MET  4) Patient will be independent with HEP to allow for continued improvement in daily tasks at home and in the community in 3 visits. MET  LTG  1) Patient will have >/=4+/5 strength in lateral hip musculature to aid in stability with ambulation on varied surfaces in community in 8 weeks. IN PROGRESS  2) Patient will be able to perform proper squatting technique in order to prevent increased pain with daily tasks in 8 weeks. MET  3) Patient will be able to perform >2 seconds of L SLS on even ground in order to allow for safe ambulation and to demonstrate reduced fall risk within the community in 8 weeks. MET  4) Patient will be able to ambulate x 300' with LRAD for improved ability to navigate community ambulation. MET  5)  Patient will improve Oswestry Low Back Disability Questionnaire <20% in order to indicate a measurable improvement in daily function and ability to complete daily tasks in 4 weeks. IN PROGRESS  6) Pt will be able to ambulate 80' during session with supervision without assistive device to demo improved gait and balance by discharge. MET                   Shayla Perez, PT

## 2025-04-08 ENCOUNTER — TREATMENT (OUTPATIENT)
Dept: PHYSICAL THERAPY | Facility: CLINIC | Age: 65
End: 2025-04-08
Payer: MEDICARE

## 2025-04-08 DIAGNOSIS — M54.50 LOW BACK PAIN, UNSPECIFIED BACK PAIN LATERALITY, UNSPECIFIED CHRONICITY, UNSPECIFIED WHETHER SCIATICA PRESENT: ICD-10-CM

## 2025-04-08 PROCEDURE — 97110 THERAPEUTIC EXERCISES: CPT | Mod: GP | Performed by: PHYSICAL THERAPIST

## 2025-04-08 PROCEDURE — 97530 THERAPEUTIC ACTIVITIES: CPT | Mod: GP | Performed by: PHYSICAL THERAPIST

## 2025-04-08 ASSESSMENT — PAIN - FUNCTIONAL ASSESSMENT: PAIN_FUNCTIONAL_ASSESSMENT: 0-10

## 2025-04-08 ASSESSMENT — PAIN SCALES - GENERAL: PAINLEVEL_OUTOF10: 6

## 2025-04-14 ENCOUNTER — TREATMENT (OUTPATIENT)
Dept: PHYSICAL THERAPY | Facility: CLINIC | Age: 65
End: 2025-04-14
Payer: MEDICARE

## 2025-04-14 ENCOUNTER — APPOINTMENT (OUTPATIENT)
Dept: HEMATOLOGY/ONCOLOGY | Facility: CLINIC | Age: 65
End: 2025-04-14
Payer: MEDICARE

## 2025-04-14 DIAGNOSIS — M54.50 LOW BACK PAIN, UNSPECIFIED BACK PAIN LATERALITY, UNSPECIFIED CHRONICITY, UNSPECIFIED WHETHER SCIATICA PRESENT: Primary | ICD-10-CM

## 2025-04-14 PROCEDURE — 97530 THERAPEUTIC ACTIVITIES: CPT | Mod: GP,CQ

## 2025-04-14 PROCEDURE — 97112 NEUROMUSCULAR REEDUCATION: CPT | Mod: GP,CQ

## 2025-04-14 PROCEDURE — 97110 THERAPEUTIC EXERCISES: CPT | Mod: GP,CQ

## 2025-04-14 ASSESSMENT — PAIN - FUNCTIONAL ASSESSMENT: PAIN_FUNCTIONAL_ASSESSMENT: 0-10

## 2025-04-14 ASSESSMENT — PAIN SCALES - GENERAL: PAINLEVEL_OUTOF10: 7

## 2025-04-14 NOTE — PROGRESS NOTES
"  Physical Therapy Treatment    Patient Name: Julia Carrion  MRN: [id number]  Today's Date: 4/14/2025  Time Calculation  Start Time: 0928  Stop Time: 1012  Time Calculation (min): 44 min  PT Therapeutic Procedures Time Entry  Neuromuscular Re-Education Time Entry: 10  Therapeutic Exercise Time Entry: 19  Therapeutic Activity Time Entry: 15,      Current Problem  Problem List Items Addressed This Visit             ICD-10-CM    Low back pain - Primary M54.50       Insurance:  Number of Treatments Authorized: 2/4 (8 total visits in prior auths)  Certification Period Start Date: 03/27/25  Certification Period End Date: 04/25/25      Subjective   General  Reason for Referral: LBP/Gait  Referred By: YOJANA Johnston  Past Medical History Relevant to Rehab: transient ischemic attack (TIA), cerebral infarction with left sided weakness, breast cancer with chemo/radiation/double mastectomy  General Comment: Patient notes she's struggling a bit this morning in regards to her LBP.  Feels her new mattress is a contributing factor.    Performing HEP?: Partially - will to the standing exercises in the afternoon.  Has not been performing lumbar flexion/rotation stretches in supine.     Precautions  Precautions  STEADI Fall Risk Score (The score of 4 or more indicates an increased risk of falling): 7  Precautions Comment: moderate fall risk  Pain  Pain Assessment: 0-10  0-10 (Numeric) Pain Score: 7  Pain Type: Chronic pain  Pain Location: Back  Pain Orientation: Left    Objective       Treatments:    Therapeutic Exercise  Therapeutic Exercise Activity 1: SciFit hills L3 x 6 min  Therapeutic Exercise Activity 2: supine R/L SKTC 5\" x 10 ea  Therapeutic Exercise Activity 3: LTR x 2 min  Therapeutic Exercise Activity 4: alt R/L hip ABD x 1 min  Therapeutic Exercise Activity 5: alt R/L hip diagonals x 1 min  Therapeutic Exercise Activity 6: alt R/L hip ext x 1 min  Therapeutic Exercise Activity 7: alt R/L foot tap to 8\" step without UE x 1 " "min  Therapeutic Exercise Activity 8: seated SB RO flexion x 2 min  Therapeutic Exercise Activity 9: REISitting over sm ball with pec stretch x 10\    Balance/Neuromuscular Re-Education  Balance/Neuromuscular Re-Education Activity 1: walking with R/L cervical rotations 2 x 80'  Balance/Neuromuscular Re-Education Activity 2: walking with head nods 2 x 80'  Balance/Neuromuscular Re-Education Activity 3: Ambulating out of clinic without rollator - SBA for safety - cues for slow pattern         Therapeutic Activity  Therapeutic Activity 1: R/L lateral walking x 2 min  Therapeutic Activity 2: lateral step overs 6\" edi R/L slowly without UE x 2 min  Therapeutic Activity 3: L lateral step over 6\" edi with L return x 10  Therapeutic Activity 4: R/L fwd 4\" step ups x 1 min each  Therapeutic Activity 5: R/L lateral 4\" step up and over x 1 min without UE support  Therapeutic Activity 6: Mini squats with UE support x 1 min                OP EDUCATION:  Outpatient Education  Education Comment: perform KTC and TR exercises OOB in the morning to reduce pain    Assessment:  PT Assessment  Assessment Comment: Discussed performing ROM and gentle stretches OOB in the morning to decrease a.m. pain sx.  Focused on moving more slowly, while maintaining her balance during weight shifts without UE assist.  She did well with today's skills.    Plan:  OP PT Plan  Treatment/Interventions: Cryotherapy, Education/ Instruction, Electrical stimulation, Gait training, Hot pack, Manual therapy, Neuromuscular re-education, Self care/ home management, Taping techniques, Therapeutic activities, Therapeutic exercises  PT Plan: Skilled PT (response to flexion, LQ strengthening (L sided weakness from stroke))  PT Frequency: 1 time per week  Duration: 6 MORE weeks or 6 visits, reducing frequency as goals are met  Onset Date: 07/16/24  Certification Period Start Date: 03/27/25  Certification Period End Date: 04/25/25  Number of Treatments Authorized: " 2/4 (8 total visits in prior auths)  Rehab Potential: Good  Plan of Care Agreement: Patient    Goals:  Active       LBP/Gait       STG/LTG (Progressing)       Start:  01/16/25    Expected End:  04/21/25       STG  1)  Patient will improve Oswestry Low Back Disability Questionnaire by 10% in order to indicate a measurable improvement in daily function and ability to complete daily tasks in 4 weeks. MET  2) Patient will be able to complete ADLs with pain less than 4/10 in back in 4 weeks. PART MET  3) Patient will be able to ambulate x 150' with LRAD for short community ambulation and to reduce fall risk. MET  4) Patient will be independent with HEP to allow for continued improvement in daily tasks at home and in the community in 3 visits. MET  LTG  1) Patient will have >/=4+/5 strength in lateral hip musculature to aid in stability with ambulation on varied surfaces in community in 8 weeks. IN PROGRESS  2) Patient will be able to perform proper squatting technique in order to prevent increased pain with daily tasks in 8 weeks. MET  3) Patient will be able to perform >2 seconds of L SLS on even ground in order to allow for safe ambulation and to demonstrate reduced fall risk within the community in 8 weeks. MET  4) Patient will be able to ambulate x 300' with LRAD for improved ability to navigate community ambulation. MET  5)  Patient will improve Oswestry Low Back Disability Questionnaire <20% in order to indicate a measurable improvement in daily function and ability to complete daily tasks in 4 weeks. IN PROGRESS  6) Pt will be able to ambulate 80' during session with supervision without assistive device to demo improved gait and balance by discharge. MET                   Tatum Carreon, PTA   as above

## 2025-04-17 ENCOUNTER — TREATMENT (OUTPATIENT)
Dept: PHYSICAL THERAPY | Facility: CLINIC | Age: 65
End: 2025-04-17
Payer: MEDICARE

## 2025-04-17 DIAGNOSIS — M54.50 LOW BACK PAIN, UNSPECIFIED BACK PAIN LATERALITY, UNSPECIFIED CHRONICITY, UNSPECIFIED WHETHER SCIATICA PRESENT: Primary | ICD-10-CM

## 2025-04-17 PROCEDURE — 97112 NEUROMUSCULAR REEDUCATION: CPT | Mod: GP,CQ

## 2025-04-17 PROCEDURE — 97110 THERAPEUTIC EXERCISES: CPT | Mod: GP,CQ

## 2025-04-17 ASSESSMENT — PAIN SCALES - GENERAL: PAINLEVEL_OUTOF10: 8

## 2025-04-17 ASSESSMENT — PAIN - FUNCTIONAL ASSESSMENT: PAIN_FUNCTIONAL_ASSESSMENT: 0-10

## 2025-04-17 NOTE — PROGRESS NOTES
"  Physical Therapy Treatment    Patient Name: Julia Carrion  MRN: [id number]  Today's Date: 4/17/2025  Time Calculation  Start Time: 1155  Stop Time: 1238  Time Calculation (min): 43 min  PT Therapeutic Procedures Time Entry  Neuromuscular Re-Education Time Entry: 15  Therapeutic Exercise Time Entry: 28,      Current Problem  Problem List Items Addressed This Visit           ICD-10-CM    Low back pain - Primary M54.50       Insurance:  Number of Treatments Authorized: 3/4 (8 total visits in prior auths)  Certification Period Start Date: 03/27/25  Certification Period End Date: 04/25/25      Subjective   General  Reason for Referral: LBP/Gait  Referred By: YOJANA Johnston  Past Medical History Relevant to Rehab: transient ischemic attack (TIA), cerebral infarction with left sided weakness, breast cancer with chemo/radiation/double mastectomy  General Comment: Patient notes she started to experience an increased pain on her left flank/iliac creast area last night.  Not as bad this morning, but still bothersome.  Patient was rushing to get to the clinic today, as she got held up at another appointment.  She notes she almost fell because she was rushing and her B LEs feel like their asleep.    Performing HEP?: Yes    Precautions  Precautions  STEADI Fall Risk Score (The score of 4 or more indicates an increased risk of falling): 7  Precautions Comment: moderate fall risk  Pain  Pain Assessment: 0-10  0-10 (Numeric) Pain Score: 8  Pain Type: Chronic pain  Pain Location: Back  Pain Orientation: Left    Objective       Treatments:    Therapeutic Exercise  Therapeutic Exercise Activity 1: seated arms crossed on chest - right thoracic rotation x 10  Therapeutic Exercise Activity 2: seated SB 3-way RO x 10 ea  Therapeutic Exercise Activity 3: R/L alt march with LE tap to 8\" step x 1 min  Therapeutic Exercise Activity 4: heel raises x 20  Therapeutic Exercise Activity 5: alt R/L hip EXT x 1 min  Therapeutic Exercise Activity 6: " "side stepping with B HHA R/L x 30' ea  Therapeutic Exercise Activity 7: side stepping R/L without assist x 30' ea dir  Therapeutic Exercise Activity 8: bridging 2 x 5  Therapeutic Exercise Activity 9: Lumbar rocking x 1 min  Therapeutic Exercise Activity 10: R/L SKTC 5\" x 2 min  Therapeutic Exercise Activity 11: DKTC x 30 sec    Balance/Neuromuscular Re-Education  Balance/Neuromuscular Re-Education Activity 1: R/L lateral step over 6\" edi with return x 10 ea without UE  Balance/Neuromuscular Re-Education Activity 2: R/L retro step with return x 10 ea  Balance/Neuromuscular Re-Education Activity 3: NBOS firm surface with arms crossed x 1 min  Balance/Neuromuscular Re-Education Activity 4: NBOS with 5# KB waist pass 2 x 5 ea dir  Balance/Neuromuscular Re-Education Activity 5: NBOS with 5# KB lift         Therapeutic Activity  Therapeutic Activity 1: Ambulating out of clinic without rollator - SBA for safety - cues for slow pattern and heel strike focus L                OP EDUCATION:  Outpatient Education  Education Comment: same    Assessment:  PT Assessment  Assessment Comment: Patient arrived to the clinic after being rushed.  She was experiencing an increase in her L sided flank/hip area pain over the past day or so.  Right rotation and SB RO to the right felt like good stretches for L LB.  Increased eccentric LE control with step and hold drills.  Felt better after session.    Plan:  OP PT Plan  Treatment/Interventions: Cryotherapy, Education/ Instruction, Electrical stimulation, Gait training, Hot pack, Manual therapy, Neuromuscular re-education, Self care/ home management, Taping techniques, Therapeutic activities, Therapeutic exercises  PT Plan: Skilled PT (response to flexion, LQ strengthening (L sided weakness from stroke))  PT Frequency: 1 time per week  Duration: 6 MORE weeks or 6 visits, reducing frequency as goals are met  Onset Date: 07/16/24  Certification Period Start Date: 03/27/25  Certification " Period End Date: 04/25/25  Number of Treatments Authorized: 3/4 (8 total visits in prior auths)  Rehab Potential: Good  Plan of Care Agreement: Patient    Goals:  Active       LBP/Gait       STG/LTG (Progressing)       Start:  01/16/25    Expected End:  04/21/25       STG  1)  Patient will improve Oswestry Low Back Disability Questionnaire by 10% in order to indicate a measurable improvement in daily function and ability to complete daily tasks in 4 weeks. MET  2) Patient will be able to complete ADLs with pain less than 4/10 in back in 4 weeks. PART MET  3) Patient will be able to ambulate x 150' with LRAD for short community ambulation and to reduce fall risk. MET  4) Patient will be independent with HEP to allow for continued improvement in daily tasks at home and in the community in 3 visits. MET  LTG  1) Patient will have >/=4+/5 strength in lateral hip musculature to aid in stability with ambulation on varied surfaces in community in 8 weeks. IN PROGRESS  2) Patient will be able to perform proper squatting technique in order to prevent increased pain with daily tasks in 8 weeks. MET  3) Patient will be able to perform >2 seconds of L SLS on even ground in order to allow for safe ambulation and to demonstrate reduced fall risk within the community in 8 weeks. MET  4) Patient will be able to ambulate x 300' with LRAD for improved ability to navigate community ambulation. MET  5)  Patient will improve Oswestry Low Back Disability Questionnaire <20% in order to indicate a measurable improvement in daily function and ability to complete daily tasks in 4 weeks. IN PROGRESS  6) Pt will be able to ambulate 80' during session with supervision without assistive device to demo improved gait and balance by discharge. MET                   Tatum Carreon, PTA

## 2025-04-22 ENCOUNTER — APPOINTMENT (OUTPATIENT)
Dept: PHYSICAL THERAPY | Facility: CLINIC | Age: 65
End: 2025-04-22
Payer: MEDICARE

## 2025-04-25 ENCOUNTER — APPOINTMENT (OUTPATIENT)
Dept: PHYSICAL THERAPY | Facility: CLINIC | Age: 65
End: 2025-04-25
Payer: MEDICARE

## 2025-04-30 ENCOUNTER — HOSPITAL ENCOUNTER (OUTPATIENT)
Dept: RADIOLOGY | Facility: HOSPITAL | Age: 65
Discharge: HOME | End: 2025-04-30
Payer: MEDICARE

## 2025-04-30 ENCOUNTER — APPOINTMENT (OUTPATIENT)
Dept: LAB | Facility: HOSPITAL | Age: 65
End: 2025-04-30
Payer: MEDICARE

## 2025-04-30 ENCOUNTER — APPOINTMENT (OUTPATIENT)
Dept: RADIOLOGY | Facility: CLINIC | Age: 65
End: 2025-04-30
Payer: MEDICARE

## 2025-04-30 ENCOUNTER — LAB (OUTPATIENT)
Dept: LAB | Facility: HOSPITAL | Age: 65
End: 2025-04-30
Payer: MEDICARE

## 2025-04-30 DIAGNOSIS — Z78.0 MENOPAUSE: ICD-10-CM

## 2025-04-30 DIAGNOSIS — C50.911 MALIGNANT NEOPLASM OF RIGHT BREAST IN FEMALE, ESTROGEN RECEPTOR POSITIVE, UNSPECIFIED SITE OF BREAST: ICD-10-CM

## 2025-04-30 DIAGNOSIS — Z17.0 MALIGNANT NEOPLASM OF RIGHT BREAST IN FEMALE, ESTROGEN RECEPTOR POSITIVE, UNSPECIFIED SITE OF BREAST: ICD-10-CM

## 2025-04-30 DIAGNOSIS — C50.911 MALIGNANT NEOPLASM OF UNSPECIFIED SITE OF RIGHT FEMALE BREAST: Primary | ICD-10-CM

## 2025-04-30 LAB
ALBUMIN SERPL BCP-MCNC: 4.4 G/DL (ref 3.4–5)
ALP SERPL-CCNC: 87 U/L (ref 33–136)
ALT SERPL W P-5'-P-CCNC: 10 U/L (ref 7–45)
ANION GAP SERPL CALCULATED.3IONS-SCNC: 11 MMOL/L (ref 10–20)
AST SERPL W P-5'-P-CCNC: 14 U/L (ref 9–39)
BILIRUB SERPL-MCNC: 0.3 MG/DL (ref 0–1.2)
BUN SERPL-MCNC: 11 MG/DL (ref 6–23)
CALCIUM SERPL-MCNC: 9.4 MG/DL (ref 8.6–10.3)
CHLORIDE SERPL-SCNC: 104 MMOL/L (ref 98–107)
CO2 SERPL-SCNC: 30 MMOL/L (ref 21–32)
CREAT SERPL-MCNC: 0.65 MG/DL (ref 0.5–1.05)
EGFRCR SERPLBLD CKD-EPI 2021: >90 ML/MIN/1.73M*2
GLUCOSE SERPL-MCNC: 86 MG/DL (ref 74–99)
MAGNESIUM SERPL-MCNC: 2.03 MG/DL (ref 1.6–2.4)
PHOSPHATE SERPL-MCNC: 2.7 MG/DL (ref 2.5–4.9)
POTASSIUM SERPL-SCNC: 4.2 MMOL/L (ref 3.5–5.3)
PROT SERPL-MCNC: 7.3 G/DL (ref 6.4–8.2)
SODIUM SERPL-SCNC: 141 MMOL/L (ref 136–145)

## 2025-04-30 PROCEDURE — 77080 DXA BONE DENSITY AXIAL: CPT

## 2025-04-30 PROCEDURE — 84100 ASSAY OF PHOSPHORUS: CPT

## 2025-04-30 PROCEDURE — 80053 COMPREHEN METABOLIC PANEL: CPT

## 2025-04-30 PROCEDURE — 83735 ASSAY OF MAGNESIUM: CPT

## 2025-04-30 PROCEDURE — 36415 COLL VENOUS BLD VENIPUNCTURE: CPT

## 2025-05-13 DIAGNOSIS — C50.911 MALIGNANT NEOPLASM OF RIGHT BREAST IN FEMALE, ESTROGEN RECEPTOR POSITIVE, UNSPECIFIED SITE OF BREAST: Primary | ICD-10-CM

## 2025-05-13 DIAGNOSIS — Z17.0 MALIGNANT NEOPLASM OF RIGHT BREAST IN FEMALE, ESTROGEN RECEPTOR POSITIVE, UNSPECIFIED SITE OF BREAST: Primary | ICD-10-CM

## 2025-05-15 ENCOUNTER — OFFICE VISIT (OUTPATIENT)
Dept: HEMATOLOGY/ONCOLOGY | Facility: CLINIC | Age: 65
End: 2025-05-15
Payer: MEDICARE

## 2025-05-15 ENCOUNTER — INFUSION (OUTPATIENT)
Dept: HEMATOLOGY/ONCOLOGY | Facility: CLINIC | Age: 65
End: 2025-05-15
Payer: MEDICARE

## 2025-05-15 VITALS
BODY MASS INDEX: 31.18 KG/M2 | HEART RATE: 92 BPM | OXYGEN SATURATION: 94 % | RESPIRATION RATE: 18 BRPM | WEIGHT: 169.42 LBS | HEIGHT: 62 IN | TEMPERATURE: 97.2 F | DIASTOLIC BLOOD PRESSURE: 85 MMHG | SYSTOLIC BLOOD PRESSURE: 150 MMHG

## 2025-05-15 DIAGNOSIS — Z17.0 MALIGNANT NEOPLASM OF RIGHT BREAST IN FEMALE, ESTROGEN RECEPTOR POSITIVE, UNSPECIFIED SITE OF BREAST: ICD-10-CM

## 2025-05-15 DIAGNOSIS — R60.0 BILATERAL LEG EDEMA: Primary | ICD-10-CM

## 2025-05-15 DIAGNOSIS — C50.911 MALIGNANT NEOPLASM OF RIGHT BREAST IN FEMALE, ESTROGEN RECEPTOR POSITIVE, UNSPECIFIED SITE OF BREAST: ICD-10-CM

## 2025-05-15 DIAGNOSIS — M25.552 LEFT HIP PAIN: ICD-10-CM

## 2025-05-15 PROCEDURE — 1125F AMNT PAIN NOTED PAIN PRSNT: CPT

## 2025-05-15 PROCEDURE — 99214 OFFICE O/P EST MOD 30 MIN: CPT

## 2025-05-15 PROCEDURE — 3008F BODY MASS INDEX DOCD: CPT

## 2025-05-15 PROCEDURE — 2500000004 HC RX 250 GENERAL PHARMACY W/ HCPCS (ALT 636 FOR OP/ED): Mod: JZ

## 2025-05-15 PROCEDURE — 1159F MED LIST DOCD IN RCRD: CPT

## 2025-05-15 PROCEDURE — 96374 THER/PROPH/DIAG INJ IV PUSH: CPT | Mod: INF

## 2025-05-15 RX ORDER — ZOLEDRONIC ACID 0.04 MG/ML
4 INJECTION, SOLUTION INTRAVENOUS ONCE
Status: COMPLETED | OUTPATIENT
Start: 2025-05-15 | End: 2025-05-15

## 2025-05-15 RX ORDER — DIPHENHYDRAMINE HYDROCHLORIDE 50 MG/ML
50 INJECTION, SOLUTION INTRAMUSCULAR; INTRAVENOUS AS NEEDED
OUTPATIENT
Start: 2025-10-01

## 2025-05-15 RX ORDER — ANASTROZOLE 1 MG/1
1 TABLET ORAL DAILY
Qty: 90 TABLET | Refills: 3 | Status: SHIPPED | OUTPATIENT
Start: 2025-05-15

## 2025-05-15 RX ORDER — ZOLEDRONIC ACID 0.04 MG/ML
4 INJECTION, SOLUTION INTRAVENOUS ONCE
OUTPATIENT
Start: 2025-10-01

## 2025-05-15 RX ORDER — FUROSEMIDE 20 MG/1
20 TABLET ORAL
Qty: 60 TABLET | Refills: 0 | Status: SHIPPED | OUTPATIENT
Start: 2025-05-15

## 2025-05-15 RX ORDER — FAMOTIDINE 10 MG/ML
20 INJECTION, SOLUTION INTRAVENOUS ONCE AS NEEDED
OUTPATIENT
Start: 2025-10-01

## 2025-05-15 RX ORDER — ALBUTEROL SULFATE 0.83 MG/ML
3 SOLUTION RESPIRATORY (INHALATION) AS NEEDED
OUTPATIENT
Start: 2025-10-01

## 2025-05-15 RX ORDER — EPINEPHRINE 0.3 MG/.3ML
0.3 INJECTION SUBCUTANEOUS EVERY 5 MIN PRN
OUTPATIENT
Start: 2025-10-01

## 2025-05-15 RX ADMIN — ZOLEDRONIC ACID 4 MG: 0.04 INJECTION, SOLUTION INTRAVENOUS at 14:11

## 2025-05-15 ASSESSMENT — PAIN SCALES - GENERAL: PAINLEVEL_OUTOF10: 8

## 2025-05-15 NOTE — PROGRESS NOTES
Patient here for follow up with Marimar H[facility]. Medications and allergies reviewed with patient. Patient ambulating in office with a walker.     Patient reports she is compliant on anastrozole. Patient reports swelling in lower extremities and weight gain of about 20 lbs in the last year. She has increased difficulty walking in last 2-3 weeks. She also reports new onset shaking in hands and arms and numbness on the left side that is increasing, she has a history of CVA with left sided deficit. She denies pain, nausea, vomiting, diarrhea, constipation, difficulty eating or weight loss. She denies any breast changes or abnormalities.     Per orders patient to get Zometa infusion today. She is to continue anastrozole and follow up in 6 months with next Zometa infusion. Prescription sent to pharmacy for furosemide x 1 month, she is to follow up with PCP regarding lower extremity edema.     Patient verbalized understanding using the teach back method, no further questions at this time.

## 2025-05-15 NOTE — PROGRESS NOTES
Patient ID: Julia Carrion is a 65 y.o. female.    The patient presents to clinic today for her history of breast cancer.     Cancer Staging   Malignant neoplasm of right breast in female, estrogen receptor positive  Staging form: Breast, AJCC 8th Edition  - Pathologic stage from 10/17/2022: No Stage Recommended (ypT2, pN2, cM0) - Unsigned    Diagnostic/Therapeutic History:  She felt a change in her right breast for at least 1 year.   BILATERAL BREAST CANCER     RIGHT  ER OK positive and HER2-negative.  Low genomic risk but high clinical risk.     MAMMAPRINT SUMMARY OF RESULTS:  LOW RISK LUMINAL-TYPE (A)                                 MAMMAPRINT INDEX:  +0.092                                                                                        Probability of pCR: 2%                                                                                                        Some delay in care related to CVA 10/2021.  Changes in the skin noted and at 2 areas of disease noted.  Clinically stage T3 N1.     Left Breast - DCIS      S/P 9/26/22 bilateral mastectomy--- yT4b N2 s/p 4 months of  neoadjuvant AI/palbociclib.  10/17 right ax dissection with 1/7 nodes involved.  - Attempted adjuvant therapy again with CDk4-6 inhib with very poor tolerance of Abemaciclib with transition to Palbociclib. This was also not tolerated.  - Maintained on anastrozole monotherapy.     Family History:  Sister with breast cancer in her 50's.    History of Present Illness (HPI)/Interval History:  Ms. Carrion presents for presents today for follow-up, treatment and surveillance. She is compliant on anastrozole.      No breast cancer concerns.      She denies any chest pain or breathing issues.     She denies any vision changes or headache issues, dizziness. Uses walker for stability since stroke. Endorses her left side of her body's numbness is worsening.       She has chronic back pain. She saw a spinal surgeon in Jan 2025, opted for PT.  She endorses more focal pain in the left pelvic/low back and left hip.      She denies any skin lesions or masses.    She has had an increase in weight ~ 20 lbs over the last year. Does have bilateral lower extremity pitting edema.     She admits sleep has been better with CPAP. Denies hot flashes or mood swings.      Continues to smoke a pack and a half a day. No thoughts of quitting. Continues to abstain from liquor.      Review of Systems:  14-point ROS otherwise negative, as per HPI.    Past Medical History:   Diagnosis Date    Cancer (Multi)     Deficiency of other specified B group vitamins     Folate deficiency    Personal history of diseases of the blood and blood-forming organs and certain disorders involving the immune mechanism     History of macrocytosis    Personal history of other diseases of the circulatory system     History of hypertension    Personal history of other diseases of the nervous system and sense organs     History of peripheral neuropathy    Personal history of other endocrine, nutritional and metabolic disease     History of hypokalemia    Personal history of transient ischemic attack (TIA), and cerebral infarction without residual deficits     History of stroke    Sleep apnea     Stroke (Multi)        Past Surgical History:   Procedure Laterality Date    BI STEREOTACTIC GUIDED BREAST LEFT LOCALIZATION AND BIOPSY Left 3/28/2022    BI STEREOTACTIC GUIDED BREAST LOCALIZATION AND BIOPSY LEFT [facility]    BI US GUIDED BREAST LOCALIZATION AND BIOPSY RIGHT Right 3/28/2022    BI US GUIDED BREAST LOCALIZATION AND BIOPSY RIGHT [facility]    BI US GUIDED BREAST LOCALIZATION RIGHT Right 9/26/2022    BI US GUIDED BREAST LOCALIZATION RIGHT [facility]    MR HEAD ANGIO WO IV CONTRAST  10/26/2021    MR HEAD ANGIO WO IV CONTRAST [facility]    OTHER SURGICAL HISTORY  01/04/2022    Cataract surgery    OTHER SURGICAL HISTORY  01/04/2022    Hysterectomy    US GUIDED  BIOPSY LYMPH NODE SUPERFICIAL  3/28/2022    US GUIDED BIOPSY LYMPH NODE SUPERFICIAL [facility]       Social History     Socioeconomic History    Marital status:    Tobacco Use    Smoking status: Every Day     Current packs/day: 1.50     Average packs/day: 1.5 packs/day for 15.0 years (22.5 ttl pk-yrs)     Types: Cigarettes     Passive exposure: Current    Smokeless tobacco: Never   Vaping Use    Vaping status: Never Used   Substance and Sexual Activity    Alcohol use: Yes     Alcohol/week: 21.0 standard drinks of alcohol     Types: 21 Cans of beer per week     Comment: daily    Drug use: Never    Sexual activity: Defer       No Known Allergies      Current Outpatient Medications:     aspirin 81 mg EC tablet, Take 1 tablet (81 mg) by mouth once daily., Disp: , Rfl:     atorvastatin (Lipitor) 40 mg tablet, TAKE ONE TABLET BY MOUTH EVERY DAY, Disp: 90 tablet, Rfl: 0    cyanocobalamin (Vitamin B-12) 1,000 mcg tablet, Take 1 tablet (1,000 mcg) by mouth once daily., Disp: , Rfl:     famotidine (Pepcid) 20 mg tablet, Take 1 tablet (20 mg) by mouth 2 times a day., Disp: 60 tablet, Rfl: 11    magnesium oxide 400 mg magnesium capsule, Take by mouth., Disp: , Rfl:     multivitamin tablet, Take 1 tablet by mouth once daily., Disp: , Rfl:     NON FORMULARY, Folic acid 1mg tab daily, Disp: , Rfl:     traZODone (Desyrel) 100 mg tablet, TAKE 1 TABLET BY MOUTH EVERY NIGHT AT BEDTIME, Disp: 90 tablet, Rfl: 0    anastrozole (Arimidex) 1 mg tablet, Take 1 tablet (1 mg total) by mouth once daily.  Swallow whole with a drink of water., Disp: 90 tablet, Rfl: 3    furosemide (Lasix) 20 mg tablet, Take 1 tablet (20 mg) by mouth 2 times daily (morning and late afternoon)., Disp: 60 tablet, Rfl: 0  No current facility-administered medications for this visit.    Facility-Administered Medications Ordered in Other Visits:     zoledronic acid (Zometa) 4 mg  mL, 4 mg, intravenous, Once, Marimar Hutson PA-C, Stopped at  "05/15/25 1441     Objective    BSA: 1.83 meters squared  /85 (BP Location: Right arm, Patient Position: Sitting, BP Cuff Size: Adult long)   Pulse 92   Temp 36.2 °C (97.2 °F) (Temporal)   Resp 18   Ht (S) 1.573 m (5' 1.93\")   Wt 76.9 kg (169 lb 6.8 oz)   SpO2 94%   BMI 31.06 kg/m²     Performance Status:  The ECOG performance scale today is ECO- Restricted in physically strenuous activity.  Carries out light duty.    Physical Exam  Vitals reviewed.   Constitutional:       General: She is awake. She is not in acute distress.     Appearance: Normal appearance. She is not ill-appearing.   HENT:      Mouth/Throat:      Pharynx: Oropharynx is clear. No oropharyngeal exudate.   Eyes:      General: No scleral icterus.     Conjunctiva/sclera: Conjunctivae normal.   Neck:      Trachea: Trachea and phonation normal. No tracheal tenderness.   Cardiovascular:      Rate and Rhythm: Normal rate and regular rhythm.      Heart sounds: No murmur heard.     No friction rub. No gallop.   Pulmonary:      Effort: Pulmonary effort is normal. No respiratory distress.      Breath sounds: Normal breath sounds. No stridor. No wheezing, rhonchi or rales.   Chest:      Chest wall: No mass, lacerations, deformity or tenderness.   Breasts:     Right: Absent.      Left: Absent.      Comments: S/p bilateral mastectomies   Abdominal:      General: Abdomen is flat. There is no distension.      Palpations: Abdomen is soft. There is no mass.      Tenderness: There is no abdominal tenderness.   Lymphadenopathy:      Cervical: No cervical adenopathy.      Upper Body:      Right upper body: No supraclavicular, axillary or pectoral adenopathy.      Left upper body: No supraclavicular, axillary or pectoral adenopathy.   Skin:     General: Skin is warm and dry.      Coloration: Skin is not jaundiced.      Findings: No lesion or rash.   Neurological:      General: No focal deficit present.      Mental Status: She is alert and oriented to " person, place, and time.      Motor: No weakness.      Gait: Gait normal.   Psychiatric:         Mood and Affect: Mood normal.         Thought Content: Thought content normal.         Judgment: Judgment normal.         Laboratory Data:  Lab Results   Component Value Date    WBC 8.0 12/07/2023    HGB 15.3 12/07/2023    HCT 46.6 (H) 12/07/2023     (H) 12/07/2023     12/07/2023    ANC 2.20 08/08/2022       Chemistry    Lab Results   Component Value Date/Time     04/30/2025 1401    K 4.2 04/30/2025 1401     04/30/2025 1401    CO2 30 04/30/2025 1401    BUN 11 04/30/2025 1401    CREATININE 0.65 04/30/2025 1401    Lab Results   Component Value Date/Time    CALCIUM 9.4 04/30/2025 1401    ALKPHOS 87 04/30/2025 1401    AST 14 04/30/2025 1401    ALT 10 04/30/2025 1401    BILITOT 0.3 04/30/2025 1401             Radiology:  XR DEXA bone density  Narrative: Interpreted By:  Brett Reyes,   STUDY:  DEXA BONE DENSITY4/30/2025 2:08 pm      INDICATION:  Signs/Symptoms:menopause / ai use / osteopenia. The patient is a 66 y/o  year old postmenopausal F. Menopause at age 50.      COMPARISON:  April 2023.      ACCESSION NUMBER(S):  [id number]      ORDERING CLINICIAN:  DONAVON DORSEY      TECHNIQUE:  DEXA BONE DENSITY      FINDINGS:  SPINE L1-L4  Bone Mineral Density: 1.05 g/cm2  T-Score 0.0  Z-Score 1.8  Bone Mineral Density change vs baseline:  2.2 %  Bone Mineral Density change vs previous: 2.2 %      LEFT FEMUR -TOTAL  Bone Mineral Density: 0.837 g/cm2  T-Score -0.9   Z-Score  0.4  Bone Mineral Density change vs baseline: -1.2 %  Bone Mineral Density change vs previous: -1.2 %      LEFT FEMUR -NECK  Bone Mineral Density: 0.679 g/cm2  T-Score -1.5  Z-Score 0.0          World Health Organization (WHO) criteria for post-menopausal,   Women:  Normal:         T-score at or above -1 SD  Osteopenia:   T-score between -1 and -2.5 SD  Osteoporosis: T-score at or below -2.5 SD          10-year Fracture  Risk:  Major Osteoporotic Fracture  8.8 %  Hip Fracture                        0.9 %      Note:  If no FRAX score is reported, it is because:  Some T-score for Spine Total or Hip Total or Femoral Neck at or below  -2.5      Impression: DEXA:  According to World Health Organization criteria,  classification is low bone mass (osteopenia)      Followup recommended in two years or sooner as clinically warranted.      All images and detailed analysis are available on the  Radiology  PACS.      MACRO:  None          Signed by: Brett Reyes 5/1/2025 12:35 PM  Dictation workstation:   [id number]       No results found for this or any previous visit from the past 365 days.          Assessment/Plan:    Julia Carrion is a 65 y.o. female with a history of right breast cancer, who presents today follow-up evaluation.    Locally advanced RIGHT breast cancer + Left DCIS.  Not deemed to be a candidate for chemotherapy. CDK4/6 inhibitor poorly tolerated in adjuvant setting. No further CDK4/6 inhibitor to be given.  - Continue anastrozole 1 mg daily.   - Will discuss length of anastrozole at next OV (UK predict/cts5)   - Continue with adj zometa today dose #4  - Monitor back pain - does sound MSK in nature. Will monitor     Elevated Hemoglobin   Smoking history of > 40 pack years   Hgb on 8/31/23: 18.1 > 15.3   - Normalized in dec 2023 labs      Osteopenia (-1.5 as of 4/2025)   - Will initiate bisphosphonate therapy for breast cancer, as above.  - Zometa #4 given today   - Ca/VitD supplementation recommended.     RUE lymphedema. Improved     H/o stroke with residual left-sided deficits.  - Follows with Neurology. On atorvastatin, aspirin, Plavix.  - Needs a follow up with neurology     General health:   - Bilateral lower leg swelling - likely fluid overload/venous insufficiency: lasix 40 mg BID. Needs to see her PCP  - low pelvic pain and left femur pain: xray ordered   - Encouraged her to follow up with PCP     Disposition    - RTC in 6 months   - Zometa q6 month, next due 11/2025 #5  - Encouraged to call with concerns/questions in the interim.    There is no evidence of breast cancer recurrence based on her clinical exam today.      Assess/Plan SmartLinks:   Problem List Items Addressed This Visit           ICD-10-CM    Malignant neoplasm of right breast in female, estrogen receptor positive C50.911, Z17.0    Relevant Medications    anastrozole (Arimidex) 1 mg tablet    Other Relevant Orders    Clinic Appointment Request Follow up; DONAVON DORSEY    [facility]     Other Visit Diagnoses         Codes      Bilateral leg edema    -  Primary R60.0    Relevant Medications    furosemide (Lasix) 20 mg tablet    Other Relevant Orders    XR hip left with pelvis when performed 2 or 3 views      Left hip pain     M25.552    Relevant Orders    XR hip left with pelvis when performed 2 or 3 views            Orders Placed This Encounter   Procedures    XR hip left with pelvis when performed 2 or 3 views     Standing Status:   Future     Expected Date:   5/15/2025     Expiration Date:   5/15/2026     Reason for exam::   left hip pain and low left sided pelvic pain     Radiologist to Determine Optimal Study:   Yes     Release result to [facility]   Immediate [1]     Is this exam part of a Research Study? If Yes, link this order to the research study:   No             Donavon Dorsey PA-C  Hematology and Medical Oncology  [facility]

## 2025-05-19 ENCOUNTER — HOSPITAL ENCOUNTER (OUTPATIENT)
Dept: RADIOLOGY | Facility: HOSPITAL | Age: 65
Discharge: HOME | End: 2025-05-19
Payer: MEDICARE

## 2025-05-19 DIAGNOSIS — R60.0 BILATERAL LEG EDEMA: ICD-10-CM

## 2025-05-19 DIAGNOSIS — M54.50 LOW BACK PAIN, UNSPECIFIED BACK PAIN LATERALITY, UNSPECIFIED CHRONICITY, UNSPECIFIED WHETHER SCIATICA PRESENT: ICD-10-CM

## 2025-05-19 DIAGNOSIS — M25.552 LEFT HIP PAIN: ICD-10-CM

## 2025-05-19 PROCEDURE — 72110 X-RAY EXAM L-2 SPINE 4/>VWS: CPT

## 2025-05-19 PROCEDURE — 72110 X-RAY EXAM L-2 SPINE 4/>VWS: CPT | Performed by: RADIOLOGY

## 2025-05-19 PROCEDURE — 73502 X-RAY EXAM HIP UNI 2-3 VIEWS: CPT | Mod: LT

## 2025-05-19 PROCEDURE — 73502 X-RAY EXAM HIP UNI 2-3 VIEWS: CPT | Mod: LEFT SIDE | Performed by: RADIOLOGY

## 2025-05-21 ENCOUNTER — TELEPHONE (OUTPATIENT)
Dept: HEMATOLOGY/ONCOLOGY | Facility: CLINIC | Age: 65
End: 2025-05-21
Payer: MEDICARE

## 2025-05-21 DIAGNOSIS — E78.5 HYPERLIPIDEMIA, UNSPECIFIED HYPERLIPIDEMIA TYPE: ICD-10-CM

## 2025-05-21 DIAGNOSIS — G47.00 INSOMNIA, UNSPECIFIED TYPE: ICD-10-CM

## 2025-05-21 RX ORDER — TRAZODONE HYDROCHLORIDE 100 MG/1
100 TABLET ORAL NIGHTLY
Qty: 90 TABLET | Refills: 0 | Status: SHIPPED | OUTPATIENT
Start: 2025-05-21

## 2025-05-21 RX ORDER — ATORVASTATIN CALCIUM 40 MG/1
40 TABLET, FILM COATED ORAL DAILY
Qty: 90 TABLET | Refills: 0 | Status: SHIPPED | OUTPATIENT
Start: 2025-05-21

## 2025-05-21 NOTE — TELEPHONE ENCOUNTER
Pt left voicemail requesting refill(s) of the populated rx(s). Pt last seen 12/12/24 and has upcoming appt 6/10/25. Would like them sent to [facility].

## 2025-05-21 NOTE — TELEPHONE ENCOUNTER
Reason for Conversation  Med Refill    Background   Refill Anastrozole Furosamide 20 mg [facility]     Disposition   No disposition on file.

## 2025-05-22 ENCOUNTER — APPOINTMENT (OUTPATIENT)
Dept: CARDIOLOGY | Facility: HOSPITAL | Age: 65
DRG: 281 | End: 2025-05-22
Payer: MEDICARE

## 2025-05-22 ENCOUNTER — HOSPITAL ENCOUNTER (INPATIENT)
Facility: HOSPITAL | Age: 65
LOS: 2 days | Discharge: HOME | End: 2025-05-24
Attending: INTERNAL MEDICINE | Admitting: INTERNAL MEDICINE
Payer: MEDICARE

## 2025-05-22 ENCOUNTER — APPOINTMENT (OUTPATIENT)
Dept: RADIOLOGY | Facility: HOSPITAL | Age: 65
DRG: 281 | End: 2025-05-22
Payer: MEDICARE

## 2025-05-22 ENCOUNTER — TELEPHONE (OUTPATIENT)
Dept: HEMATOLOGY/ONCOLOGY | Facility: CLINIC | Age: 65
End: 2025-05-22
Payer: MEDICARE

## 2025-05-22 DIAGNOSIS — I21.02 ST ELEVATION (STEMI) MYOCARDIAL INFARCTION INVOLVING LEFT ANTERIOR DESCENDING CORONARY ARTERY (MULTI): ICD-10-CM

## 2025-05-22 DIAGNOSIS — N17.9 ACUTE KIDNEY INJURY: ICD-10-CM

## 2025-05-22 DIAGNOSIS — I25.5 ISCHEMIC CARDIOMYOPATHY: ICD-10-CM

## 2025-05-22 DIAGNOSIS — I21.3 STEMI (ST ELEVATION MYOCARDIAL INFARCTION) (MULTI): ICD-10-CM

## 2025-05-22 DIAGNOSIS — I21.3 ST ELEVATION MYOCARDIAL INFARCTION (STEMI), UNSPECIFIED ARTERY (MULTI): Primary | ICD-10-CM

## 2025-05-22 LAB
ACT BLD: 287 SEC (ref 89–169)
ALBUMIN SERPL BCP-MCNC: 4.6 G/DL (ref 3.4–5)
ALP SERPL-CCNC: 95 U/L (ref 33–136)
ALT SERPL W P-5'-P-CCNC: 42 U/L (ref 7–45)
ANION GAP SERPL CALCULATED.3IONS-SCNC: 16 MMOL/L (ref 10–20)
AORTIC VALVE MEAN GRADIENT: 4 MMHG
AORTIC VALVE PEAK VELOCITY: 1.47 M/S
APTT PPP: 34.6 SECONDS (ref 22–32.5)
AST SERPL W P-5'-P-CCNC: 279 U/L (ref 9–39)
AV PEAK GRADIENT: 9 MMHG
AVA (PEAK VEL): 2.29 CM2
AVA (VTI): 2.28 CM2
BASOPHILS # BLD AUTO: 0.05 X10*3/UL (ref 0–0.1)
BASOPHILS NFR BLD AUTO: 0.3 %
BILIRUB SERPL-MCNC: 0.6 MG/DL (ref 0–1.2)
BNP SERPL-MCNC: 294 PG/ML (ref 0–99)
BUN SERPL-MCNC: 13 MG/DL (ref 6–23)
CALCIUM SERPL-MCNC: 9.6 MG/DL (ref 8.6–10.3)
CARDIAC TROPONIN I PNL SERPL HS: ABNORMAL NG/L (ref 0–13)
CARDIAC TROPONIN I PNL SERPL HS: ABNORMAL NG/L (ref 0–13)
CHLORIDE SERPL-SCNC: 99 MMOL/L (ref 98–107)
CO2 SERPL-SCNC: 25 MMOL/L (ref 21–32)
CREAT SERPL-MCNC: 0.92 MG/DL (ref 0.5–1.05)
EGFRCR SERPLBLD CKD-EPI 2021: 69 ML/MIN/1.73M*2
EJECTION FRACTION APICAL 4 CHAMBER: 52.7
EJECTION FRACTION: 38 %
EOSINOPHIL # BLD AUTO: 0.04 X10*3/UL (ref 0–0.7)
EOSINOPHIL NFR BLD AUTO: 0.3 %
ERYTHROCYTE [DISTWIDTH] IN BLOOD BY AUTOMATED COUNT: 12.5 % (ref 11.5–14.5)
GLUCOSE SERPL-MCNC: 144 MG/DL (ref 74–99)
HCT VFR BLD AUTO: 45.1 % (ref 36–46)
HGB BLD-MCNC: 15.4 G/DL (ref 12–16)
IMM GRANULOCYTES # BLD AUTO: 0.06 X10*3/UL (ref 0–0.7)
IMM GRANULOCYTES NFR BLD AUTO: 0.4 % (ref 0–0.9)
INR PPP: 1 (ref 0.9–1.2)
LEFT VENTRICLE INTERNAL DIMENSION DIASTOLE: 5.47 CM (ref 3.5–6)
LEFT VENTRICULAR OUTFLOW TRACT DIAMETER: 1.9 CM
LV EJECTION FRACTION BIPLANE: 49 %
LYMPHOCYTES # BLD AUTO: 1.97 X10*3/UL (ref 1.2–4.8)
LYMPHOCYTES NFR BLD AUTO: 13.8 %
MAGNESIUM SERPL-MCNC: 2.14 MG/DL (ref 1.6–2.4)
MCH RBC QN AUTO: 32.5 PG (ref 26–34)
MCHC RBC AUTO-ENTMCNC: 34.1 G/DL (ref 32–36)
MCV RBC AUTO: 95 FL (ref 80–100)
MITRAL VALVE E/A RATIO: 0.84
MONOCYTES # BLD AUTO: 1.03 X10*3/UL (ref 0.1–1)
MONOCYTES NFR BLD AUTO: 7.2 %
NEUTROPHILS # BLD AUTO: 11.16 X10*3/UL (ref 1.2–7.7)
NEUTROPHILS NFR BLD AUTO: 78 %
NRBC BLD-RTO: 0 /100 WBCS (ref 0–0)
PHOSPHATE SERPL-MCNC: 2.9 MG/DL (ref 2.5–4.9)
PLATELET # BLD AUTO: 230 X10*3/UL (ref 150–450)
POTASSIUM SERPL-SCNC: 4.1 MMOL/L (ref 3.5–5.3)
PROT SERPL-MCNC: 8 G/DL (ref 6.4–8.2)
PROTHROMBIN TIME: 11.2 SECONDS (ref 9.3–12.7)
RBC # BLD AUTO: 4.74 X10*6/UL (ref 4–5.2)
SODIUM SERPL-SCNC: 136 MMOL/L (ref 136–145)
WBC # BLD AUTO: 14.3 X10*3/UL (ref 4.4–11.3)

## 2025-05-22 PROCEDURE — B2151ZZ FLUOROSCOPY OF LEFT HEART USING LOW OSMOLAR CONTRAST: ICD-10-PCS | Performed by: INTERNAL MEDICINE

## 2025-05-22 PROCEDURE — C1887 CATHETER, GUIDING: HCPCS | Performed by: INTERNAL MEDICINE

## 2025-05-22 PROCEDURE — 93005 ELECTROCARDIOGRAM TRACING: CPT

## 2025-05-22 PROCEDURE — 85610 PROTHROMBIN TIME: CPT

## 2025-05-22 PROCEDURE — C1725 CATH, TRANSLUMIN NON-LASER: HCPCS | Performed by: INTERNAL MEDICINE

## 2025-05-22 PROCEDURE — 2500000001 HC RX 250 WO HCPCS SELF ADMINISTERED DRUGS (ALT 637 FOR MEDICARE OP): Performed by: NURSE PRACTITIONER

## 2025-05-22 PROCEDURE — 027034Z DILATION OF CORONARY ARTERY, ONE ARTERY WITH DRUG-ELUTING INTRALUMINAL DEVICE, PERCUTANEOUS APPROACH: ICD-10-PCS | Performed by: INTERNAL MEDICINE

## 2025-05-22 PROCEDURE — C1894 INTRO/SHEATH, NON-LASER: HCPCS | Performed by: INTERNAL MEDICINE

## 2025-05-22 PROCEDURE — 2500000001 HC RX 250 WO HCPCS SELF ADMINISTERED DRUGS (ALT 637 FOR MEDICARE OP)

## 2025-05-22 PROCEDURE — 84100 ASSAY OF PHOSPHORUS: CPT

## 2025-05-22 PROCEDURE — C1760 CLOSURE DEV, VASC: HCPCS | Performed by: INTERNAL MEDICINE

## 2025-05-22 PROCEDURE — 71045 X-RAY EXAM CHEST 1 VIEW: CPT | Performed by: RADIOLOGY

## 2025-05-22 PROCEDURE — 36415 COLL VENOUS BLD VENIPUNCTURE: CPT | Performed by: STUDENT IN AN ORGANIZED HEALTH CARE EDUCATION/TRAINING PROGRAM

## 2025-05-22 PROCEDURE — 2720000007 HC OR 272 NO HCPCS: Performed by: INTERNAL MEDICINE

## 2025-05-22 PROCEDURE — 96374 THER/PROPH/DIAG INJ IV PUSH: CPT | Mod: 59

## 2025-05-22 PROCEDURE — 85730 THROMBOPLASTIN TIME PARTIAL: CPT

## 2025-05-22 PROCEDURE — 99285 EMERGENCY DEPT VISIT HI MDM: CPT | Mod: 25

## 2025-05-22 PROCEDURE — 2550000001 HC RX 255 CONTRASTS: Performed by: INTERNAL MEDICINE

## 2025-05-22 PROCEDURE — 2500000005 HC RX 250 GENERAL PHARMACY W/O HCPCS: Performed by: NURSE PRACTITIONER

## 2025-05-22 PROCEDURE — C9606 PERC D-E COR REVASC W AMI S: HCPCS | Mod: LD | Performed by: INTERNAL MEDICINE

## 2025-05-22 PROCEDURE — 93458 L HRT ARTERY/VENTRICLE ANGIO: CPT | Performed by: INTERNAL MEDICINE

## 2025-05-22 PROCEDURE — C1769 GUIDE WIRE: HCPCS | Performed by: INTERNAL MEDICINE

## 2025-05-22 PROCEDURE — 85347 COAGULATION TIME ACTIVATED: CPT

## 2025-05-22 PROCEDURE — 2020000001 HC ICU ROOM DAILY

## 2025-05-22 PROCEDURE — 2500000004 HC RX 250 GENERAL PHARMACY W/ HCPCS (ALT 636 FOR OP/ED)

## 2025-05-22 PROCEDURE — 99152 MOD SED SAME PHYS/QHP 5/>YRS: CPT | Performed by: INTERNAL MEDICINE

## 2025-05-22 PROCEDURE — 2500000002 HC RX 250 W HCPCS SELF ADMINISTERED DRUGS (ALT 637 FOR MEDICARE OP, ALT 636 FOR OP/ED): Performed by: NURSE PRACTITIONER

## 2025-05-22 PROCEDURE — 36415 COLL VENOUS BLD VENIPUNCTURE: CPT

## 2025-05-22 PROCEDURE — 2780000003 HC OR 278 NO HCPCS: Performed by: INTERNAL MEDICINE

## 2025-05-22 PROCEDURE — 99223 1ST HOSP IP/OBS HIGH 75: CPT

## 2025-05-22 PROCEDURE — B2111ZZ FLUOROSCOPY OF MULTIPLE CORONARY ARTERIES USING LOW OSMOLAR CONTRAST: ICD-10-PCS | Performed by: INTERNAL MEDICINE

## 2025-05-22 PROCEDURE — 93306 TTE W/DOPPLER COMPLETE: CPT | Performed by: INTERNAL MEDICINE

## 2025-05-22 PROCEDURE — 2500000002 HC RX 250 W HCPCS SELF ADMINISTERED DRUGS (ALT 637 FOR MEDICARE OP, ALT 636 FOR OP/ED)

## 2025-05-22 PROCEDURE — 71045 X-RAY EXAM CHEST 1 VIEW: CPT

## 2025-05-22 PROCEDURE — 85025 COMPLETE CBC W/AUTO DIFF WBC: CPT | Performed by: STUDENT IN AN ORGANIZED HEALTH CARE EDUCATION/TRAINING PROGRAM

## 2025-05-22 PROCEDURE — 80053 COMPREHEN METABOLIC PANEL: CPT | Performed by: STUDENT IN AN ORGANIZED HEALTH CARE EDUCATION/TRAINING PROGRAM

## 2025-05-22 PROCEDURE — 93306 TTE W/DOPPLER COMPLETE: CPT

## 2025-05-22 PROCEDURE — 93010 ELECTROCARDIOGRAM REPORT: CPT | Performed by: INTERNAL MEDICINE

## 2025-05-22 PROCEDURE — 2500000004 HC RX 250 GENERAL PHARMACY W/ HCPCS (ALT 636 FOR OP/ED): Performed by: INTERNAL MEDICINE

## 2025-05-22 PROCEDURE — 83880 ASSAY OF NATRIURETIC PEPTIDE: CPT

## 2025-05-22 PROCEDURE — 83735 ASSAY OF MAGNESIUM: CPT

## 2025-05-22 PROCEDURE — 99223 1ST HOSP IP/OBS HIGH 75: CPT | Performed by: INTERNAL MEDICINE

## 2025-05-22 PROCEDURE — C1874 STENT, COATED/COV W/DEL SYS: HCPCS | Performed by: INTERNAL MEDICINE

## 2025-05-22 PROCEDURE — 99153 MOD SED SAME PHYS/QHP EA: CPT | Performed by: INTERNAL MEDICINE

## 2025-05-22 PROCEDURE — 92941 PRQ TRLML REVSC TOT OCCL AMI: CPT | Performed by: INTERNAL MEDICINE

## 2025-05-22 PROCEDURE — 84484 ASSAY OF TROPONIN QUANT: CPT | Performed by: STUDENT IN AN ORGANIZED HEALTH CARE EDUCATION/TRAINING PROGRAM

## 2025-05-22 PROCEDURE — 4A023N7 MEASUREMENT OF CARDIAC SAMPLING AND PRESSURE, LEFT HEART, PERCUTANEOUS APPROACH: ICD-10-PCS | Performed by: INTERNAL MEDICINE

## 2025-05-22 DEVICE — STENT ONYXNG30015UX ONYX 3.00X15RX
Type: IMPLANTABLE DEVICE | Site: HEART | Status: FUNCTIONAL
Brand: ONYX FRONTIER™

## 2025-05-22 RX ORDER — ACETAMINOPHEN 160 MG/5ML
650 SOLUTION ORAL EVERY 6 HOURS PRN
Status: DISCONTINUED | OUTPATIENT
Start: 2025-05-22 | End: 2025-05-24 | Stop reason: HOSPADM

## 2025-05-22 RX ORDER — LIDOCAINE HYDROCHLORIDE 10 MG/ML
INJECTION, SOLUTION EPIDURAL; INFILTRATION; INTRACAUDAL; PERINEURAL AS NEEDED
Status: DISCONTINUED | OUTPATIENT
Start: 2025-05-22 | End: 2025-05-22 | Stop reason: HOSPADM

## 2025-05-22 RX ORDER — ANASTROZOLE 1 MG/1
1 TABLET ORAL DAILY
Status: DISCONTINUED | OUTPATIENT
Start: 2025-05-22 | End: 2025-05-24 | Stop reason: HOSPADM

## 2025-05-22 RX ORDER — ACETAMINOPHEN 650 MG/1
650 SUPPOSITORY RECTAL EVERY 6 HOURS PRN
Status: DISCONTINUED | OUTPATIENT
Start: 2025-05-22 | End: 2025-05-24 | Stop reason: HOSPADM

## 2025-05-22 RX ORDER — HEPARIN SODIUM 5000 [USP'U]/ML
4000 INJECTION, SOLUTION INTRAVENOUS; SUBCUTANEOUS ONCE
Status: COMPLETED | OUTPATIENT
Start: 2025-05-22 | End: 2025-05-22

## 2025-05-22 RX ORDER — ASPIRIN 81 MG/1
81 TABLET ORAL DAILY
Status: DISCONTINUED | OUTPATIENT
Start: 2025-05-23 | End: 2025-05-24 | Stop reason: HOSPADM

## 2025-05-22 RX ORDER — ACETAMINOPHEN 325 MG/1
650 TABLET ORAL EVERY 6 HOURS PRN
Status: DISCONTINUED | OUTPATIENT
Start: 2025-05-22 | End: 2025-05-24 | Stop reason: HOSPADM

## 2025-05-22 RX ORDER — TICAGRELOR 90 MG/1
90 TABLET, FILM COATED ORAL 2 TIMES DAILY
Status: DISCONTINUED | OUTPATIENT
Start: 2025-05-22 | End: 2025-05-24 | Stop reason: HOSPADM

## 2025-05-22 RX ORDER — HEPARIN SODIUM 1000 [USP'U]/ML
INJECTION, SOLUTION INTRAVENOUS; SUBCUTANEOUS AS NEEDED
Status: DISCONTINUED | OUTPATIENT
Start: 2025-05-22 | End: 2025-05-22 | Stop reason: HOSPADM

## 2025-05-22 RX ORDER — NITROGLYCERIN 0.4 MG/1
0.4 TABLET SUBLINGUAL EVERY 5 MIN PRN
Status: DISCONTINUED | OUTPATIENT
Start: 2025-05-22 | End: 2025-05-24 | Stop reason: HOSPADM

## 2025-05-22 RX ORDER — IODIXANOL 320 MG/ML
INJECTION, SOLUTION INTRAVASCULAR AS NEEDED
Status: DISCONTINUED | OUTPATIENT
Start: 2025-05-22 | End: 2025-05-22 | Stop reason: HOSPADM

## 2025-05-22 RX ORDER — MIDAZOLAM HYDROCHLORIDE 1 MG/ML
INJECTION, SOLUTION INTRAMUSCULAR; INTRAVENOUS AS NEEDED
Status: DISCONTINUED | OUTPATIENT
Start: 2025-05-22 | End: 2025-05-22 | Stop reason: HOSPADM

## 2025-05-22 RX ORDER — FENTANYL CITRATE 50 UG/ML
INJECTION, SOLUTION INTRAMUSCULAR; INTRAVENOUS AS NEEDED
Status: DISCONTINUED | OUTPATIENT
Start: 2025-05-22 | End: 2025-05-22 | Stop reason: HOSPADM

## 2025-05-22 RX ORDER — TICAGRELOR 90 MG/1
180 TABLET, FILM COATED ORAL ONCE
Status: COMPLETED | OUTPATIENT
Start: 2025-05-22 | End: 2025-05-22

## 2025-05-22 RX ORDER — ATORVASTATIN CALCIUM 40 MG/1
40 TABLET, FILM COATED ORAL NIGHTLY
Status: DISCONTINUED | OUTPATIENT
Start: 2025-05-22 | End: 2025-05-24 | Stop reason: HOSPADM

## 2025-05-22 RX ORDER — TRAZODONE HYDROCHLORIDE 100 MG/1
100 TABLET ORAL NIGHTLY PRN
Status: DISCONTINUED | OUTPATIENT
Start: 2025-05-22 | End: 2025-05-24 | Stop reason: HOSPADM

## 2025-05-22 RX ORDER — NAPROXEN SODIUM 220 MG/1
324 TABLET, FILM COATED ORAL ONCE
Status: COMPLETED | OUTPATIENT
Start: 2025-05-22 | End: 2025-05-22

## 2025-05-22 RX ORDER — FUROSEMIDE 20 MG/1
20 TABLET ORAL
Status: DISCONTINUED | OUTPATIENT
Start: 2025-05-22 | End: 2025-05-24 | Stop reason: HOSPADM

## 2025-05-22 RX ADMIN — Medication 2 L/MIN: at 12:04

## 2025-05-22 RX ADMIN — ATORVASTATIN CALCIUM 40 MG: 40 TABLET, FILM COATED ORAL at 21:03

## 2025-05-22 RX ADMIN — TICAGRELOR 90 MG: 90 TABLET ORAL at 21:03

## 2025-05-22 RX ADMIN — Medication 4 L/MIN: at 23:36

## 2025-05-22 RX ADMIN — Medication 2 L/MIN: at 14:30

## 2025-05-22 RX ADMIN — NITROGLYCERIN 0.4 MG: 0.4 TABLET SUBLINGUAL at 10:34

## 2025-05-22 RX ADMIN — NITROGLYCERIN 0.4 MG: 0.4 TABLET SUBLINGUAL at 10:24

## 2025-05-22 RX ADMIN — HEPARIN SODIUM 4000 UNITS: 5000 INJECTION, SOLUTION INTRAVENOUS; SUBCUTANEOUS at 10:22

## 2025-05-22 RX ADMIN — TICAGRELOR 180 MG: 90 TABLET ORAL at 10:22

## 2025-05-22 RX ADMIN — ASPIRIN 324 MG: 81 TABLET, CHEWABLE ORAL at 10:22

## 2025-05-22 SDOH — SOCIAL STABILITY: SOCIAL INSECURITY: ARE THERE ANY APPARENT SIGNS OF INJURIES/BEHAVIORS THAT COULD BE RELATED TO ABUSE/NEGLECT?: NO

## 2025-05-22 SDOH — SOCIAL STABILITY: SOCIAL INSECURITY: ABUSE: ADULT

## 2025-05-22 SDOH — SOCIAL STABILITY: SOCIAL INSECURITY
WITHIN THE LAST YEAR, HAVE YOU BEEN KICKED, HIT, SLAPPED, OR OTHERWISE PHYSICALLY HURT BY YOUR PARTNER OR EX-PARTNER?: NO

## 2025-05-22 SDOH — ECONOMIC STABILITY: INCOME INSECURITY: IN THE PAST 12 MONTHS HAS THE ELECTRIC, GAS, OIL, OR WATER COMPANY THREATENED TO SHUT OFF SERVICES IN YOUR HOME?: NO

## 2025-05-22 SDOH — SOCIAL STABILITY: SOCIAL INSECURITY: WITHIN THE LAST YEAR, HAVE YOU BEEN HUMILIATED OR EMOTIONALLY ABUSED IN OTHER WAYS BY YOUR PARTNER OR EX-PARTNER?: NO

## 2025-05-22 SDOH — SOCIAL STABILITY: SOCIAL INSECURITY: DOES ANYONE TRY TO KEEP YOU FROM HAVING/CONTACTING OTHER FRIENDS OR DOING THINGS OUTSIDE YOUR HOME?: NO

## 2025-05-22 SDOH — ECONOMIC STABILITY: FOOD INSECURITY: WITHIN THE PAST 12 MONTHS, THE FOOD YOU BOUGHT JUST DIDN'T LAST AND YOU DIDN'T HAVE MONEY TO GET MORE.: NEVER TRUE

## 2025-05-22 SDOH — SOCIAL STABILITY: SOCIAL INSECURITY
WITHIN THE LAST YEAR, HAVE YOU BEEN RAPED OR FORCED TO HAVE ANY KIND OF SEXUAL ACTIVITY BY YOUR PARTNER OR EX-PARTNER?: NO

## 2025-05-22 SDOH — SOCIAL STABILITY: SOCIAL INSECURITY: WITHIN THE LAST YEAR, HAVE YOU BEEN AFRAID OF YOUR PARTNER OR EX-PARTNER?: NO

## 2025-05-22 SDOH — SOCIAL STABILITY: SOCIAL INSECURITY: ARE YOU OR HAVE YOU BEEN THREATENED OR ABUSED PHYSICALLY, EMOTIONALLY, OR SEXUALLY BY ANYONE?: NO

## 2025-05-22 SDOH — ECONOMIC STABILITY: FOOD INSECURITY: WITHIN THE PAST 12 MONTHS, YOU WORRIED THAT YOUR FOOD WOULD RUN OUT BEFORE YOU GOT THE MONEY TO BUY MORE.: NEVER TRUE

## 2025-05-22 SDOH — SOCIAL STABILITY: SOCIAL INSECURITY: DO YOU FEEL UNSAFE GOING BACK TO THE PLACE WHERE YOU ARE LIVING?: NO

## 2025-05-22 SDOH — SOCIAL STABILITY: SOCIAL INSECURITY: HAS ANYONE EVER THREATENED TO HURT YOUR FAMILY OR YOUR PETS?: NO

## 2025-05-22 SDOH — SOCIAL STABILITY: SOCIAL INSECURITY: HAVE YOU HAD THOUGHTS OF HARMING ANYONE ELSE?: NO

## 2025-05-22 SDOH — SOCIAL STABILITY: SOCIAL INSECURITY: DO YOU FEEL ANYONE HAS EXPLOITED OR TAKEN ADVANTAGE OF YOU FINANCIALLY OR OF YOUR PERSONAL PROPERTY?: NO

## 2025-05-22 SDOH — SOCIAL STABILITY: SOCIAL INSECURITY: HAVE YOU HAD ANY THOUGHTS OF HARMING ANYONE ELSE?: NO

## 2025-05-22 SDOH — SOCIAL STABILITY: SOCIAL INSECURITY: WERE YOU ABLE TO COMPLETE ALL THE BEHAVIORAL HEALTH SCREENINGS?: YES

## 2025-05-22 ASSESSMENT — ACTIVITIES OF DAILY LIVING (ADL)
HEARING - LEFT EAR: FUNCTIONAL
ADEQUATE_TO_COMPLETE_ADL: YES
ASSISTIVE_DEVICE: WALKER;EYEGLASSES
JUDGMENT_ADEQUATE_SAFELY_COMPLETE_DAILY_ACTIVITIES: YES
WALKS IN HOME: INDEPENDENT
GROOMING: INDEPENDENT
LACK_OF_TRANSPORTATION: NO
TOILETING: INDEPENDENT
HEARING - RIGHT EAR: FUNCTIONAL
FEEDING YOURSELF: INDEPENDENT
BATHING: INDEPENDENT
DRESSING YOURSELF: INDEPENDENT
PATIENT'S MEMORY ADEQUATE TO SAFELY COMPLETE DAILY ACTIVITIES?: YES

## 2025-05-22 ASSESSMENT — COGNITIVE AND FUNCTIONAL STATUS - GENERAL
PATIENT BASELINE BEDBOUND: NO
DAILY ACTIVITIY SCORE: 24
MOBILITY SCORE: 24

## 2025-05-22 ASSESSMENT — PAIN - FUNCTIONAL ASSESSMENT
PAIN_FUNCTIONAL_ASSESSMENT: 0-10

## 2025-05-22 ASSESSMENT — PAIN DESCRIPTION - LOCATION: LOCATION: CHEST

## 2025-05-22 ASSESSMENT — LIFESTYLE VARIABLES
AUDIT-C TOTAL SCORE: 0
HOW MANY STANDARD DRINKS CONTAINING ALCOHOL DO YOU HAVE ON A TYPICAL DAY: PATIENT DOES NOT DRINK
HOW OFTEN DO YOU HAVE 6 OR MORE DRINKS ON ONE OCCASION: NEVER
HOW OFTEN DO YOU HAVE A DRINK CONTAINING ALCOHOL: NEVER
SKIP TO QUESTIONS 9-10: 1
AUDIT-C TOTAL SCORE: 0

## 2025-05-22 ASSESSMENT — PAIN SCALES - GENERAL
PAINLEVEL_OUTOF10: 10 - WORST POSSIBLE PAIN
PAINLEVEL_OUTOF10: 0 - NO PAIN

## 2025-05-22 ASSESSMENT — PAIN DESCRIPTION - PAIN TYPE: TYPE: ACUTE PAIN

## 2025-05-22 ASSESSMENT — PAIN DESCRIPTION - ORIENTATION: ORIENTATION: MID

## 2025-05-22 ASSESSMENT — PATIENT HEALTH QUESTIONNAIRE - PHQ9
SUM OF ALL RESPONSES TO PHQ9 QUESTIONS 1 & 2: 0
2. FEELING DOWN, DEPRESSED OR HOPELESS: NOT AT ALL
1. LITTLE INTEREST OR PLEASURE IN DOING THINGS: NOT AT ALL

## 2025-05-22 NOTE — H&P
History Of Present Illness:    Julia Carrion is a 65 y.o. female presenting with chest pain.  Patient with history of COPD, hypertension, hyperlipidemia.  Patient actively smoking 1 pack cigarettes a day.  Developed yesterday chest pain retrosternal moderate intensity continuous pain.  Was feeling nauseated.  Called this morning her primary care physician who instructed her to come to the emergency room.  Patient reports pain at her substernal pressure radiating to the left arm associated with nausea moderate intensity and constant.  EKG in the emergency room showed 1 mm ST elevation inferior leads.  Patient was taken emergently for cardiac catheterization.  Cardiac catheterization showed critical 99% proximal LAD with HENNA II flow.  LV gram showed actually apical severe wall hypokinesis from eburnation.  Patient underwent successful PCI to proximal LAD.    Review of systems.  10 point review of systems otherwise negative.     Last Recorded Vitals:  Vitals:    05/22/25 1106 05/22/25 1141 05/22/25 1200 05/22/25 1204   BP:  144/87 118/75    Patient Position:       Pulse:  89     Resp:  16     Temp:       TempSrc:       SpO2: 98% 100% 91% 93%   Weight:       Height:           Last Labs:  CBC - 5/22/2025: 10:21 AM  14.3 15.4 230    45.1      CMP - 5/22/2025: 10:21 AM  9.6 8.0 279 --- 0.6   2.7 4.6 42 95      PTT - 5/22/2025: 10:21 AM  1.0   11.2 34.6     Troponin I, High Sensitivity   Date/Time Value Ref Range Status   05/22/2025 10:21 AM 54,983 (HH) 0 - 13 ng/L Final     Hemoglobin A1C   Date/Time Value Ref Range Status   10/26/2021 06:22 AM 5.4 4.0 - 6.0 % Final     Comment:     Hemoglobin A1C levels are related to mean blood glucose during the   preceding 2-3 months. The relationship table below may be used as a   general guide. Each 1% increase in HGB A1C is a reflection of an   increase in mean glucose of approximately 30 mg/dl.   Reference: Diabetes Care, volume 29, supplement 1 Jan. 2006                      "   HGB A1C ................. Approx. Mean Glucose   _______________________________________________   6%   ...............................  120 mg/dl   7%   ...............................  150 mg/dl   8%   ...............................  180 mg/dl   9%   ...............................  210 mg/dl   10%  ...............................  240 mg/dl  Performed at [address] [address]     10/10/2021 05:04 AM 5.6 4.3 - 5.6 % Final     Comment:     American Diabetes Association guidelines indicate that patients with HgbA1c in   the range 5.7-6.4% are at increased risk for development of diabetes, and   intervention by lifestyle modification may be beneficial. HgbA1c greater or   equal to 6.5% is considered diagnostic of diabetes.     LDL Calculated   Date/Time Value Ref Range Status   10/26/2021 06:22 AM 83 65 - 130 MG/DL Final      Last I/O:  No intake/output data recorded.    Past Cardiology Tests (Last 3 Years):  EKG:  ECG 12 Lead 08/19/2024    Echo:  No results found for this or any previous visit from the past 1095 days.    Ejection Fractions:  No results found for: \"EF\"  Cath:  No results found for this or any previous visit from the past 1095 days.    Stress Test:  No results found for this or any previous visit from the past 1095 days.    Cardiac Imaging:  No results found for this or any previous visit from the past 1095 days.      Past Medical History:  She has a past medical history of Cancer (Multi), Deficiency of other specified B group vitamins, Personal history of diseases of the blood and blood-forming organs and certain disorders involving the immune mechanism, Personal history of other diseases of the circulatory system, Personal history of other diseases of the nervous system and sense organs, Personal history of other endocrine, nutritional and metabolic disease, Personal history of transient ischemic attack (TIA), and cerebral infarction without residual deficits, Sleep " apnea, and Stroke (Multi).    Past Surgical History:  She has a past surgical history that includes Other surgical history (01/04/2022); Other surgical history (01/04/2022); MR angio head wo IV contrast (10/26/2021); US guided biopsy lymph node superficial (3/28/2022); BI US guided breast localization and biopsy right (Right, 3/28/2022); BI stereotactic guided breast left localization and biopsy (Left, 3/28/2022); and BI US guided breast localization right (Right, 9/26/2022).      Social History:  She reports that she has been smoking cigarettes. She has a 22.5 pack-year smoking history. She has been exposed to tobacco smoke. She has never used smokeless tobacco. She reports current alcohol use of about 21.0 standard drinks of alcohol per week. She reports that she does not use drugs.    Family History:  Family History[1]     Allergies:  Patient has no known allergies.    Inpatient Medications:  Scheduled Medications[2]  PRN Medications[3]  Continuous Medications[4]  Outpatient Medications:  Current Outpatient Medications   Medication Instructions    anastrozole (ARIMIDEX) 1 mg, oral, Daily, Swallow whole with a drink of water.    aspirin 81 mg, Daily    atorvastatin (LIPITOR) 40 mg, oral, Daily    cyanocobalamin (VITAMIN B-12) 1,000 mcg, Daily    famotidine (PEPCID) 20 mg, oral, 2 times daily    furosemide (LASIX) 20 mg, oral, 2 times daily (morning and late afternoon)    magnesium oxide 400 mg magnesium capsule Take by mouth.    multivitamin tablet 1 tablet, Daily    NON FORMULARY Folic acid 1mg tab daily    traZODone (DESYREL) 100 mg, oral, Nightly       Physical Exam:  General: Patient is in no acute distress.  HEENT: atraumatic normocephalic.  Neck: is supple jugular venous pressure within normal limits no thyromegaly.  Cardiovascular regular rate and rhythm normal heart sounds no murmurs rubs or gallops.  Lungs: Scattered rhonchi   abdomen: is soft nontender.  Extremities warm to touch no edema.  Neurologic  examination: patient is awake alert oriented to person, place, date/time.  Psychiatric examination: patient has good insight denies feeling suicidal and depressed.  Pulses 2+ intact bilaterally     Assessment/Plan   #1 acute ST elevation myocardial infarction status post PCI to proximal LAD with drug-eluting stent with excellent result.  Patient does have moderately reduced ejection fraction of 40% with apical anterior wall hypokinesis.  Recommend aspirin Brilinta high intensity statin.  We will obtain echocardiogram.  Discussed with patient lifestyle modification smoking cessation.    2.  Hypertension.  Will start patient on beta-blockers as well as ACE inhibitors for anterior STEMI with severe hypokinesis of anterior wall.  Will monitor.    3.  Hyperlipidemia recommend high intensity statin.    4.  Elevated liver function test likely secondary to acute myocardial infarction we will elevated troponin of 54,000 AST probably reflection of her myocardial infarction and delayed presentation of more than 12 hours.    5.  COPD.  Will consult ICU team for input and optimization of her treatment.    6.  Smoking cessation discussed with patient.        Peripheral IV 05/22/25 18 G Right Antecubital (Active)   Site Assessment Clean;Dry;Intact;No hematoma 05/22/25 1049   Dressing Status Clean;Dry;Occlusive 05/22/25 1049   Number of days: 0       Code Status:  No Order    Maribel Bateman MD       [1]   Family History  Problem Relation Name Age of Onset    Cancer Sister La    [2]   Scheduled medications   Medication Dose Route Frequency    [START ON 5/23/2025] aspirin  81 mg oral Daily    atorvastatin  40 mg oral Nightly    oxygen   inhalation Continuous - 02/gases    perflutren lipid microspheres  0.5-10 mL of dilution intravenous Once in imaging    perflutren protein A microsphere  0.5 mL intravenous Once in imaging    sulfur hexafluoride microsphr  2 mL intravenous Once in imaging    ticagrelor  90 mg oral BID   [3]   PRN  medications   Medication    acetaminophen    Or    acetaminophen    Or    acetaminophen    nitroglycerin   [4]   Continuous Medications   Medication Dose Last Rate

## 2025-05-22 NOTE — ED PROVIDER NOTES
HPI   Chief Complaint   Patient presents with    Chest Pain       Patient is a 65-year-old female presents emergency apartment for evaluation of chest pain and shortness of breath starting last night worsening today.  Patient states that he feels a pressure in the center of her chest associated shortness of breath.  She states wears a CPAP at night so thought that may be sleeping overnight with her CPAP would improve her symptoms.  When short of this morning the pain worsened and she states last night she had an episode of vomiting.  She appears very diaphoretic and ill-appearing currently having active symptoms.  She denies any history of heart disease but notes history of hypertension and hyperlipidemia and recently was started on furosemide due to swelling in her legs.      History provided by:  Patient   used: No            Patient History   Medical History[1]  Surgical History[2]  Family History[3]  Social History[4]    Physical Exam   ED Triage Vitals   Temp Pulse Resp BP   -- -- -- --      SpO2 Temp src Heart Rate Source Patient Position   -- -- -- --      BP Location FiO2 (%)     -- --       Physical Exam  Constitutional:       General: She is in acute distress.      Appearance: She is diaphoretic.   Cardiovascular:      Rate and Rhythm: Normal rate and regular rhythm.   Pulmonary:      Effort: Pulmonary effort is normal.      Breath sounds: Normal breath sounds.   Musculoskeletal:         General: Normal range of motion.      Comments: Bilateral lower extremity edema pitting.   Neurological:      General: No focal deficit present.      Mental Status: She is alert and oriented to person, place, and time.           ED Course & MDM   ED Course as of 05/22/25 1752   Thu May 22, 2025   1027 EKG interpreted by me: Normal sinus rhythm.  ST elevation in lead I and aVL with ST depression in lead II and III. [ML]      ED Course User Index  [ML] Alberto Dumont MD         Diagnoses as of 05/22/25  1752   ST elevation myocardial infarction (STEMI), unspecified artery (Multi)                 No data recorded     Sandi Coma Scale Score: 15 (05/22/25 1200 : Xochitl Mayo RN)                           Medical Decision Making  Patient is a 65-year-old female presents emergency department for evaluation of chest pain and shortness of breath with diaphoresis.    EKG was interpreted by attending physician and reviewed by me.    Lab work done today included BMP, CBC, troponins, proBNP, PT/INR, APTT.  Lab work pending at time of patient admission.    Scans done today were interpreted/confirmed by radiologist and also interpreted by me which included chest x-ray.  Chest x-ray pending at time of patient admission.    Medications given at today's visit include p.o. Brilinta, p.o. aspirin, p.o. nitro, IV heparin    I saw this patient in conjunction with Dr. Dumont.  Initial EKG shows findings concerning for ST elevation in 1 and aVL with reciprocal changes in 2 and 3 concerning for acute STEMI.  Given this STEMI activation was initiated and cardiology on-call Dr. Bateman consulted.  He reviewed EKG and given patient's story and presentation, agrees with STEMI activation will take patient immediately to Cath Lab.  Patient given nitro, aspirin, Brilinta and heparin and taken immediately to Cath Lab for PCI.    ** Disclaimer:  Parts of this document were written utilizing a voice to text dictation software.  Note may contain minor transcription or typographical errors that were inadvertently transcribed by the computer software.        Procedure  Critical Care    Performed by: Gina Leonard PA-C  Authorized by: Gina Leonard PA-C    Critical care provider statement:     Critical care time (minutes):  15    Critical care was necessary to treat or prevent imminent or life-threatening deterioration of the following conditions:  Cardiac failure    Critical care was time spent personally by me on the following activities:   Blood draw for specimens, development of treatment plan with patient or surrogate, discussions with consultants, discussions with primary provider, evaluation of patient's response to treatment, examination of patient, interpretation of cardiac output measurements, ordering and performing treatments and interventions, ordering and review of laboratory studies, ordering and review of radiographic studies, pulse oximetry, re-evaluation of patient's condition and review of old charts    Care discussed with: admitting provider           [1]   Past Medical History:  Diagnosis Date    Cancer (Multi)     Deficiency of other specified B group vitamins     Folate deficiency    Personal history of diseases of the blood and blood-forming organs and certain disorders involving the immune mechanism     History of macrocytosis    Personal history of other diseases of the circulatory system     History of hypertension    Personal history of other diseases of the nervous system and sense organs     History of peripheral neuropathy    Personal history of other endocrine, nutritional and metabolic disease     History of hypokalemia    Personal history of transient ischemic attack (TIA), and cerebral infarction without residual deficits     History of stroke    Sleep apnea     Stroke (Multi)    [2]   Past Surgical History:  Procedure Laterality Date    BI STEREOTACTIC GUIDED BREAST LEFT LOCALIZATION AND BIOPSY Left 3/28/2022    BI STEREOTACTIC GUIDED BREAST LOCALIZATION AND BIOPSY LEFT [facility]    BI US GUIDED BREAST LOCALIZATION AND BIOPSY RIGHT Right 3/28/2022    BI US GUIDED BREAST LOCALIZATION AND BIOPSY RIGHT [facility]    BI US GUIDED BREAST LOCALIZATION RIGHT Right 9/26/2022    BI US GUIDED BREAST LOCALIZATION RIGHT [facility]    MR HEAD ANGIO WO IV CONTRAST  10/26/2021    MR HEAD ANGIO WO IV CONTRAST [facility]    OTHER SURGICAL HISTORY  01/04/2022    Cataract surgery    OTHER SURGICAL  HISTORY  01/04/2022    Hysterectomy    US GUIDED BIOPSY LYMPH NODE SUPERFICIAL  3/28/2022    US GUIDED BIOPSY LYMPH NODE SUPERFICIAL [facility]   [3]   Family History  Problem Relation Name Age of Onset    Cancer Sister La    [4]   Social History  Tobacco Use    Smoking status: Every Day     Current packs/day: 1.50     Average packs/day: 1.5 packs/day for 15.0 years (22.5 ttl pk-yrs)     Types: Cigarettes     Passive exposure: Current    Smokeless tobacco: Never   Vaping Use    Vaping status: Never Used   Substance Use Topics    Alcohol use: Yes     Alcohol/week: 21.0 standard drinks of alcohol     Types: 21 Cans of beer per week     Comment: daily    Drug use: Never        Gina Leonard PA-C  05/22/25 [id number]

## 2025-05-22 NOTE — PROGRESS NOTES
ED Pharmacist Response - STEMI    NAME:  Julia Carrion   MRN:  [id number]   SERVICE DATE: 5/22/2025   SERVICE TIME:   10:35 AM     Pharmacy attended a STEMI activation.     The patient received the following medications in the Emergency Department:  Aspirin  mg, Ticagrelor 180mg, and Nitroglycerin 0.4mg, and Heparin 4,000 units IV    Dr. Bateman consulted, will send patient to cath lab

## 2025-05-22 NOTE — Clinical Note
Angioplasty of the proximal left anterior descending lesion. Inflation 1: Pressure = 8 cristian; Duration = 20 sec.

## 2025-05-22 NOTE — TELEPHONE ENCOUNTER
Spoke to Julia.  She describes severe midsternal chest pain with SOB and vomiting last night.  Still present this am.  Instructed her to go IMMEDIATELY to the ER or call 911 NOW.  She stated agreement. Marimar Hutson notified.

## 2025-05-22 NOTE — CARE PLAN
The patient's goals for the shift include      The clinical goals for the shift include remain hemodynamically stable    Problem: Pain - Adult  Goal: Verbalizes/displays adequate comfort level or baseline comfort level  Outcome: Progressing     Problem: Safety - Adult  Goal: Free from fall injury  Outcome: Progressing     Problem: Discharge Planning  Goal: Discharge to home or other facility with appropriate resources  Outcome: Progressing     Problem: Chronic Conditions and Co-morbidities  Goal: Patient's chronic conditions and co-morbidity symptoms are monitored and maintained or improved  Outcome: Progressing     Problem: Nutrition  Goal: Nutrient intake appropriate for maintaining nutritional needs  Outcome: Progressing     Problem: Skin  Goal: Decreased wound size/increased tissue granulation at next dressing change  Outcome: Progressing  Flowsheets (Taken 5/22/2025 1327)  Decreased wound size/increased tissue granulation at next dressing change:   Promote sleep for wound healing   Utilize specialty bed per algorithm   Protective dressings over bony prominences  Goal: Participates in plan/prevention/treatment measures  Outcome: Progressing  Flowsheets (Taken 5/22/2025 1327)  Participates in plan/prevention/treatment measures:   Discuss with provider PT/OT consult   Elevate heels  Goal: Prevent/manage excess moisture  Outcome: Progressing  Flowsheets (Taken 5/22/2025 1327)  Prevent/manage excess moisture:   Cleanse incontinence/protect with barrier cream   Moisturize dry skin   Follow provider orders for dressing changes  Goal: Prevent/minimize sheer/friction injuries  Outcome: Progressing  Flowsheets (Taken 5/22/2025 1327)  Prevent/minimize sheer/friction injuries:   Complete micro-shifts as needed if patient unable. Adjust patient position to relieve pressure points, not a full turn   Increase activity/out of bed for meals   HOB 30 degrees or less   Turn/reposition every 2 hours/use positioning/transfer  devices  Goal: Promote/optimize nutrition  Outcome: Progressing  Flowsheets (Taken 5/22/2025 1327)  Promote/optimize nutrition:   Assist with feeding   Consume > 50% meals/supplements  Goal: Promote skin healing  Outcome: Progressing  Flowsheets (Taken 5/22/2025 1327)  Promote skin healing:   Assess skin/pad under line(s)/device(s)   Protective dressings over bony prominences   Turn/reposition every 2 hours/use positioning/transfer devices     Problem: ACS/CP/NSTEMI/STEMI  Goal: Chest pain managed (free from pain or at acceptable level)  Outcome: Progressing  Goal: Lab values return to normal range  Outcome: Progressing  Goal: Promote self management  Outcome: Progressing  Goal: Serial ECG will return to baseline  Outcome: Progressing  Goal: Verbalize understanding of procedures/devices  Outcome: Progressing  Goal: Wean vasopressors/achieve hemodynamic stability  Outcome: Progressing     Problem: Cardiac catheterization  Goal: Free from dysrhythmias  Outcome: Progressing  Goal: Free from pain  Outcome: Progressing  Goal: No evidence of post procedure complications  Outcome: Progressing  Goal: Promote self management  Outcome: Progressing  Goal: Verbalize understanding of procedure  Outcome: Progressing  Goal: Care and maintenance of device (specify)  Outcome: Progressing

## 2025-05-22 NOTE — ED PROVIDER NOTES
Supervisory note:  Patient seen in conjunction with SATNAM Ruiz.  Patient presents with chest pain.  She reports that the chest pain started last night and has been associated with shortness of breath.  The pain lasted through the night and became worse this morning.  She does smoke and has history of high blood pressure.  She has been vomiting this morning.  On examination, patient appears to be in acute distress.  She does have 1+ pretibial edema of bilateral lower extremities.  Heart is regular with normal rate.    EKG reveals STEMI.  STEMI alert was activated.  Patient's case was discussed with cardiology, Dr. Bateman.  Patient was given aspirin, Brilinta, and heparin.  She reports significant improvement in her symptoms following administration of nitroglycerin.  Patient was then emergently taken for intervention.    I personally saw the patient and made/approved the management plan and take responsiblity for the patient management.  Parts of this chart were completed with dictation software, please excuse any errors in transcription.    Critical Care    Performed by: Alberto Dumont MD  Authorized by: Alberto Dumont MD    Critical care provider statement:     Critical care time (minutes):  16    Critical care time was exclusive of:  Separately billable procedures and treating other patients    Critical care was necessary to treat or prevent imminent or life-threatening deterioration of the following conditions:  Cardiac failure    Critical care was time spent personally by me on the following activities:  Development of treatment plan with patient or surrogate, evaluation of patient's response to treatment, examination of patient, obtaining history from patient or surrogate, discussions with consultants, ordering and performing treatments and interventions, ordering and review of laboratory studies, ordering and review of radiographic studies, pulse oximetry, re-evaluation of patient's condition and review  of old charts    Care discussed with: admitting provider           Alberto Dumont MD  05/22/25 [id number]

## 2025-05-22 NOTE — Clinical Note
Angioplasty of the proximal left anterior descending lesion. Inflation 1: Pressure = 18 cristian; Duration = 20 sec. Inflation 2: Pressure = 15 cristian; Duration = 15 sec. Inflation 3: Pressure = 11 cristian; Duration = 11 sec.

## 2025-05-22 NOTE — TELEPHONE ENCOUNTER
Reason for Conversation  Medication Problem    Background   Patient has been vomiting,vomiting, chest pains, SOB since she started her medication. This started last night. She just started her Furosemide 20 5 dys ago. Pls advise [phone]    Disposition   No disposition on file.

## 2025-05-22 NOTE — CONSULTS
Reason For Consult  Medical management post-STEMI, DEVYN    [facility] Critical Care Medicine       Date:  5/22/2025  Patient:  Julia Carrion  YOB: 1960  MRN:  [id number]   Admit Date:  5/22/2025  Hospital Length of Stay: 0   ICU Length of Stay: 5h       Chief Complaint   Patient presents with    Chest Pain         History of Present Illness:  Julia Carrion is a 65 y.o. 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  She states that she developed substernal chest pain, shortness of breath, and nausea that started last night.  She slept with her CPAP on and noticed some relief in her symptoms, but this morning when she woke up she still had the same complaints.  She reached out to her PCPs office as she thought the diuretic that she was recently started on could be the cause of the symptoms, she was advised to present to the emergency department.  Code STEMI was activated in the field.  Upon examination, her symptoms are resolved.    ED Course: Initial vital signs- /80, HR 92, Tmax 36.7, RR 18, SpO2 99% on RA. Work-up significant for leukocytosis 14.3, troponin 54,983, EKG showed ST elevation in inferior leads with reciprocal depressions. She was given heparin, Brilinta, aspirin, sublingual nitroglycerin x2 and taken emergently to the Cath Lab for emergent cardiac catheterization.  Chest x-ray showed mild interstitial edema.      Cardiac Catheterization:  99% occlusion of the proximal LAD with HENNA II flow, LV gram with apical severe wall hypokinesis, 1 HENRY placed to  "the proximal LAD    Interval ICU Events:  5/22: Arrived to ICU with resolution of symptoms post-cardiac cath with HENRY to proximal LAD. TR band removal per protocol. Follow-up TTE. Cpap ordered for tonight.     Objective     Medical History:  Medical History[1]  Surgical History[2]  Prescriptions Prior to Admission[3]  Patient has no known allergies.  Social History[4]  Family History[5]    Hospital Medications:    Continuous Medications[6]    Current Medications[7]    Review of Systems:  14 point review of systems was completed and negative except for those specially mention in my HPI    Physical Exam:    Heart Rate:  [76-92]   Temp:  [36.7 °C (98.1 °F)-36.9 °C (98.4 °F)]   Resp:  [13-31]   BP: ()/()   Height:  [154.9 cm (5' 0.98\")-154.9 cm (5' 1\")]   Weight:  [75.4 kg (166 lb 3.6 oz)-77 kg (169 lb 12.1 oz)]   SpO2:  [89 %-100 %]     Physical Exam  Constitutional:       General: She is not in acute distress.     Appearance: She is not ill-appearing.   HENT:      Mouth/Throat:      Mouth: Mucous membranes are dry.   Eyes:      Extraocular Movements: Extraocular movements intact.      Conjunctiva/sclera: Conjunctivae normal.      Pupils: Pupils are equal, round, and reactive to light.   Cardiovascular:      Rate and Rhythm: Normal rate and regular rhythm.      Pulses: Normal pulses.      Heart sounds: Normal heart sounds.   Pulmonary:      Effort: Pulmonary effort is normal. No respiratory distress.   Abdominal:      General: There is no distension.      Palpations: Abdomen is soft.      Tenderness: There is no abdominal tenderness.   Musculoskeletal:      Right lower leg: Edema present.      Left lower leg: Edema present.   Skin:     General: Skin is warm and dry.      Coloration: Skin is pale.   Neurological:      General: No focal deficit present.      Mental Status: She is alert and oriented to person, place, and time. Mental status is at baseline.         Objective:    I have reviewed all medications, " laboratory results, and imaging pertinent for today's encounter.           Intake/Output Summary (Last 24 hours) at 5/22/2025 1707  Last data filed at 5/22/2025 1140  Gross per 24 hour   Intake --   Output 5 ml   Net -5 ml       Daily Labs:  CBC:   Results from last 7 days   Lab Units 05/22/25  1021   WBC AUTO x10*3/uL 14.3*   HEMOGLOBIN g/dL 15.4   HEMATOCRIT % 45.1   MCV fL 95     BMP:    Results from last 7 days   Lab Units 05/22/25  1021   SODIUM mmol/L 136   POTASSIUM mmol/L 4.1   CHLORIDE mmol/L 99   CO2 mmol/L 25   BUN mg/dL 13   CREATININE mg/dL 0.92   CALCIUM mg/dL 9.6   GLUCOSE mg/dL 144*     ABG:      VBG:             Assessment/Plan:    I am currently managing this critically ill patient for the following problems:    Neurology/Psychiatry/Pain Control/Sedation:   History of chronic back pain, degenerative disc disease, tobacco abuse  - Pain Control: acetaminophen PRN  - Smoking cessation advised     Respiratory/ENT:  History of DEVYN  - Supplemental O2: none, on room air   -- CPAP hs   - Maintain SpO2 >92%  - Continuous pulse ox monitoring   - Pulm hygiene, will provide and educate on IS    Cardiovascular:   Acute ST elevated myocardial infarction s/p HENRY to proximal LAD  Ischemic cardiomyopathy - LV gram w LVEF 40% and apical anterior wall hypokinesis   History of hypertension, hyperlipidemia  - TTE ordered   - Continue aspirin, brilinta, lipitor   - Will order lipid panel, A1c   - Hold evening dose of Lasix, likely can resume tomorrow  - Cardiology primary   - Would benefit from further GDMT pending   - Maintain MAPs >65  - Continuous cardiac monitoring   - EKGs PRN for ACS symptoms, arrhythmias     Gastrointestinal:  Transaminitis likely hepatic congestion iso STEMI  History of GERD  - Diet: cardiac   - BR: PRN  - GI Prophylaxis: home pepcid     Renal/Volume Status/Electrolytes:  No active concerns  - Hourly I/O's, maintain urine output >0.5cc/kg/hr  - Replete electrolytes to maintain K >4.0 and Mg  >2.0  - Daily RFP, Mg    Endocrinology:  No active concerns  - Consider adding SSI if glucose persistently >180  - Hypoglycemia protocol PRN     Infectious Disease:  Leukocytosis, predominantly neutrophils, no left shift, suspect stress-induced  - Antibiotics: not indicated   - Monitor SIRS criteria    Hematology/Oncology:  History of breast cancer  - Can hold home anastrozole for now, would resume if staying inpatient for a few days   - Transfuse if Hgb <7.0 or symptomatic anemia  - Daily CBC    MSK/Skin:  History of remote CVA with residual left lower extremity weakness  - PT/OT eval   - ICU skin protocol, padded pressure points    Ethics/Code Status:  Full code    :  DVT Prophylaxis: SCDs  GI Prophylaxis: home pepcid  Bowel Regimen: PRN  Diet: cardiac  CVC: none  Violette: none  Day: none  Restraints: none  Disposition: admit to ICU    Consult Time:  80 minutes spent in preparing to see patient (I.e. labs, imaging, etc.), documentation, discussion plan of care with patient/family/caregiver, and/or coordination of care with multidisciplinary team including the attending. Time does not include completion of procedure time.     Adama Jones PA-C  Pulmonary & Critical Care Medicine   [facility]           [1]   Past Medical History:  Diagnosis Date    Cancer (Multi)     Deficiency of other specified B group vitamins     Folate deficiency    Personal history of diseases of the blood and blood-forming organs and certain disorders involving the immune mechanism     History of macrocytosis    Personal history of other diseases of the circulatory system     History of hypertension    Personal history of other diseases of the nervous system and sense organs     History of peripheral neuropathy    Personal history of other endocrine, nutritional and metabolic disease     History of hypokalemia    Personal history of transient ischemic attack (TIA), and cerebral infarction without residual  deficits     History of stroke    Sleep apnea     Stroke (Multi)    [2]   Past Surgical History:  Procedure Laterality Date    BI STEREOTACTIC GUIDED BREAST LEFT LOCALIZATION AND BIOPSY Left 3/28/2022    BI STEREOTACTIC GUIDED BREAST LOCALIZATION AND BIOPSY LEFT [facility]    BI US GUIDED BREAST LOCALIZATION AND BIOPSY RIGHT Right 3/28/2022    BI US GUIDED BREAST LOCALIZATION AND BIOPSY RIGHT [facility]    BI US GUIDED BREAST LOCALIZATION RIGHT Right 9/26/2022    BI US GUIDED BREAST LOCALIZATION RIGHT [facility]    MR HEAD ANGIO WO IV CONTRAST  10/26/2021    MR HEAD ANGIO WO IV CONTRAST [facility]    OTHER SURGICAL HISTORY  01/04/2022    Cataract surgery    OTHER SURGICAL HISTORY  01/04/2022    Hysterectomy    US GUIDED BIOPSY LYMPH NODE SUPERFICIAL  3/28/2022    US GUIDED BIOPSY LYMPH NODE SUPERFICIAL [facility]   [3]   Medications Prior to Admission   Medication Sig Dispense Refill Last Dose/Taking    anastrozole (Arimidex) 1 mg tablet Take 1 tablet (1 mg total) by mouth once daily.  Swallow whole with a drink of water. 90 tablet 3     aspirin 81 mg EC tablet Take 1 tablet (81 mg) by mouth once daily.       atorvastatin (Lipitor) 40 mg tablet Take 1 tablet (40 mg) by mouth once daily. 90 tablet 0     cyanocobalamin (Vitamin B-12) 1,000 mcg tablet Take 1 tablet (1,000 mcg) by mouth once daily.       famotidine (Pepcid) 20 mg tablet Take 1 tablet (20 mg) by mouth 2 times a day. 60 tablet 11     furosemide (Lasix) 20 mg tablet Take 1 tablet (20 mg) by mouth 2 times daily (morning and late afternoon). 60 tablet 0     magnesium oxide 400 mg magnesium capsule Take by mouth.       multivitamin tablet Take 1 tablet by mouth once daily.       NON FORMULARY Folic acid 1mg tab daily       traZODone (Desyrel) 100 mg tablet Take 1 tablet (100 mg) by mouth once daily at bedtime. 90 tablet 0    [4]   Social History  Tobacco Use    Smoking status: Every Day     Current packs/day:  1.50     Average packs/day: 1.5 packs/day for 15.0 years (22.5 ttl pk-yrs)     Types: Cigarettes     Passive exposure: Current    Smokeless tobacco: Never   Vaping Use    Vaping status: Never Used   Substance Use Topics    Alcohol use: Yes     Alcohol/week: 21.0 standard drinks of alcohol     Types: 21 Cans of beer per week     Comment: daily    Drug use: Never   [5]   Family History  Problem Relation Name Age of Onset    Cancer Sister La    [6]    [7]   Current Facility-Administered Medications:     acetaminophen (Tylenol) tablet 650 mg, 650 mg, oral, q6h PRN **OR** acetaminophen (Tylenol) oral liquid 650 mg, 650 mg, nasogastric tube, q6h PRN **OR** acetaminophen (Tylenol) suppository 650 mg, 650 mg, rectal, q6h PRN, Amarilis Chen APRN-CNP    [START ON 5/23/2025] aspirin EC tablet 81 mg, 81 mg, oral, Daily, Amarilis Chen APRN-CNP    atorvastatin (Lipitor) tablet 40 mg, 40 mg, oral, Nightly, Amarilis Trippam, APRN-CNP    nitroglycerin (Nitrostat) SL tablet 0.4 mg, 0.4 mg, sublingual, q5 min PRN, Amarilis Chen APRN-CNP, 0.4 mg at 05/22/25 1034    oxygen (O2) therapy, , inhalation, Continuous - 02/gases, Amarilis Trippam, APRN-CNP, 2 L/min at 05/22/25 1430    perflutren lipid microspheres (Definity) injection 0.5-10 mL of dilution, 0.5-10 mL of dilution, intravenous, Once in imaging, Amarilis Chen, APRN-CNP    perflutren protein A microsphere (Optison) injection 0.5 mL, 0.5 mL, intravenous, Once in imaging, Amarilis Chen, APRN-CNP    sulfur hexafluoride microsphr (Lumason) injection 24.28 mg, 2 mL, intravenous, Once in imaging, Amarilis Trippam, APRN-CNP    ticagrelor (Brilinta) tablet 90 mg, 90 mg, oral, BID, Amarilis Trippam, APRN-CNP

## 2025-05-23 ENCOUNTER — APPOINTMENT (OUTPATIENT)
Dept: CARDIOLOGY | Facility: HOSPITAL | Age: 65
DRG: 281 | End: 2025-05-23
Payer: MEDICARE

## 2025-05-23 LAB
ALBUMIN SERPL BCP-MCNC: 4 G/DL (ref 3.4–5)
ALP SERPL-CCNC: 85 U/L (ref 33–136)
ALT SERPL W P-5'-P-CCNC: 36 U/L (ref 7–45)
ANION GAP SERPL CALCULATED.3IONS-SCNC: 9 MMOL/L (ref 10–20)
AST SERPL W P-5'-P-CCNC: 144 U/L (ref 9–39)
BASOPHILS # BLD AUTO: 0.05 X10*3/UL (ref 0–0.1)
BASOPHILS NFR BLD AUTO: 0.4 %
BILIRUB SERPL-MCNC: 0.7 MG/DL (ref 0–1.2)
BUN SERPL-MCNC: 17 MG/DL (ref 6–23)
CA-I BLD-SCNC: 1.14 MMOL/L (ref 1.1–1.33)
CALCIUM SERPL-MCNC: 9.1 MG/DL (ref 8.6–10.3)
CHLORIDE SERPL-SCNC: 104 MMOL/L (ref 98–107)
CHOLEST SERPL-MCNC: 122 MG/DL (ref 0–199)
CHOLEST/HDLC SERPL: 3.7 {RATIO}
CO2 SERPL-SCNC: 27 MMOL/L (ref 21–32)
CREAT SERPL-MCNC: 0.84 MG/DL (ref 0.5–1.05)
EGFRCR SERPLBLD CKD-EPI 2021: 77 ML/MIN/1.73M*2
EOSINOPHIL # BLD AUTO: 0.16 X10*3/UL (ref 0–0.7)
EOSINOPHIL NFR BLD AUTO: 1.4 %
ERYTHROCYTE [DISTWIDTH] IN BLOOD BY AUTOMATED COUNT: 12.5 % (ref 11.5–14.5)
EST. AVERAGE GLUCOSE BLD GHB EST-MCNC: 120 MG/DL
GLUCOSE SERPL-MCNC: 135 MG/DL (ref 74–99)
HBA1C MFR BLD: 5.8 % (ref ?–5.7)
HCT VFR BLD AUTO: 41.4 % (ref 36–46)
HDLC SERPL-MCNC: 32.7 MG/DL
HGB BLD-MCNC: 14 G/DL (ref 12–16)
IMM GRANULOCYTES # BLD AUTO: 0.04 X10*3/UL (ref 0–0.7)
IMM GRANULOCYTES NFR BLD AUTO: 0.3 % (ref 0–0.9)
LDLC SERPL CALC-MCNC: 48 MG/DL
LYMPHOCYTES # BLD AUTO: 2.25 X10*3/UL (ref 1.2–4.8)
LYMPHOCYTES NFR BLD AUTO: 19.3 %
MAGNESIUM SERPL-MCNC: 2.09 MG/DL (ref 1.6–2.4)
MCH RBC QN AUTO: 32.3 PG (ref 26–34)
MCHC RBC AUTO-ENTMCNC: 33.8 G/DL (ref 32–36)
MCV RBC AUTO: 96 FL (ref 80–100)
MONOCYTES # BLD AUTO: 1.26 X10*3/UL (ref 0.1–1)
MONOCYTES NFR BLD AUTO: 10.8 %
NEUTROPHILS # BLD AUTO: 7.89 X10*3/UL (ref 1.2–7.7)
NEUTROPHILS NFR BLD AUTO: 67.8 %
NON HDL CHOLESTEROL: 89 MG/DL (ref 0–149)
NRBC BLD-RTO: 0 /100 WBCS (ref 0–0)
PHOSPHATE SERPL-MCNC: 3.2 MG/DL (ref 2.5–4.9)
PLATELET # BLD AUTO: 185 X10*3/UL (ref 150–450)
POTASSIUM SERPL-SCNC: 3.8 MMOL/L (ref 3.5–5.3)
PROT SERPL-MCNC: 7 G/DL (ref 6.4–8.2)
RBC # BLD AUTO: 4.33 X10*6/UL (ref 4–5.2)
SODIUM SERPL-SCNC: 136 MMOL/L (ref 136–145)
TRIGL SERPL-MCNC: 205 MG/DL (ref 0–149)
VLDL: 41 MG/DL (ref 0–40)
WBC # BLD AUTO: 11.7 X10*3/UL (ref 4.4–11.3)

## 2025-05-23 PROCEDURE — 2500000001 HC RX 250 WO HCPCS SELF ADMINISTERED DRUGS (ALT 637 FOR MEDICARE OP): Performed by: NURSE PRACTITIONER

## 2025-05-23 PROCEDURE — 84100 ASSAY OF PHOSPHORUS: CPT

## 2025-05-23 PROCEDURE — 99232 SBSQ HOSP IP/OBS MODERATE 35: CPT | Performed by: NURSE PRACTITIONER

## 2025-05-23 PROCEDURE — 99233 SBSQ HOSP IP/OBS HIGH 50: CPT | Performed by: INTERNAL MEDICINE

## 2025-05-23 PROCEDURE — 2060000001 HC INTERMEDIATE ICU ROOM DAILY

## 2025-05-23 PROCEDURE — 94660 CPAP INITIATION&MGMT: CPT

## 2025-05-23 PROCEDURE — 80061 LIPID PANEL: CPT

## 2025-05-23 PROCEDURE — 83735 ASSAY OF MAGNESIUM: CPT

## 2025-05-23 PROCEDURE — 82330 ASSAY OF CALCIUM: CPT

## 2025-05-23 PROCEDURE — 36415 COLL VENOUS BLD VENIPUNCTURE: CPT

## 2025-05-23 PROCEDURE — 93005 ELECTROCARDIOGRAM TRACING: CPT

## 2025-05-23 PROCEDURE — 2500000005 HC RX 250 GENERAL PHARMACY W/O HCPCS: Performed by: NURSE PRACTITIONER

## 2025-05-23 PROCEDURE — 2500000002 HC RX 250 W HCPCS SELF ADMINISTERED DRUGS (ALT 637 FOR MEDICARE OP, ALT 636 FOR OP/ED): Performed by: NURSE PRACTITIONER

## 2025-05-23 PROCEDURE — 2500000004 HC RX 250 GENERAL PHARMACY W/ HCPCS (ALT 636 FOR OP/ED): Performed by: INTERNAL MEDICINE

## 2025-05-23 PROCEDURE — 93010 ELECTROCARDIOGRAM REPORT: CPT | Performed by: INTERNAL MEDICINE

## 2025-05-23 PROCEDURE — 85025 COMPLETE CBC W/AUTO DIFF WBC: CPT

## 2025-05-23 PROCEDURE — 83036 HEMOGLOBIN GLYCOSYLATED A1C: CPT | Mod: WESLAB

## 2025-05-23 PROCEDURE — 2500000001 HC RX 250 WO HCPCS SELF ADMINISTERED DRUGS (ALT 637 FOR MEDICARE OP): Performed by: INTERNAL MEDICINE

## 2025-05-23 PROCEDURE — 84075 ASSAY ALKALINE PHOSPHATASE: CPT

## 2025-05-23 RX ORDER — METOPROLOL SUCCINATE 50 MG/1
50 TABLET, EXTENDED RELEASE ORAL DAILY
Status: DISCONTINUED | OUTPATIENT
Start: 2025-05-23 | End: 2025-05-24 | Stop reason: HOSPADM

## 2025-05-23 RX ADMIN — ACETAMINOPHEN 650 MG: 325 TABLET ORAL at 20:01

## 2025-05-23 RX ADMIN — Medication 2 L/MIN: at 08:00

## 2025-05-23 RX ADMIN — ATORVASTATIN CALCIUM 40 MG: 40 TABLET, FILM COATED ORAL at 20:01

## 2025-05-23 RX ADMIN — TICAGRELOR 90 MG: 90 TABLET ORAL at 09:20

## 2025-05-23 RX ADMIN — Medication 2 L/MIN: at 20:07

## 2025-05-23 RX ADMIN — SACUBITRIL AND VALSARTAN 1 TABLET: 24; 26 TABLET, FILM COATED ORAL at 20:01

## 2025-05-23 RX ADMIN — TRAZODONE HYDROCHLORIDE 100 MG: 100 TABLET ORAL at 20:01

## 2025-05-23 RX ADMIN — TICAGRELOR 90 MG: 90 TABLET ORAL at 20:02

## 2025-05-23 RX ADMIN — ASPIRIN 81 MG: 81 TABLET, COATED ORAL at 09:20

## 2025-05-23 RX ADMIN — METOPROLOL SUCCINATE 50 MG: 50 TABLET, EXTENDED RELEASE ORAL at 09:20

## 2025-05-23 RX ADMIN — SACUBITRIL AND VALSARTAN 1 TABLET: 24; 26 TABLET, FILM COATED ORAL at 09:20

## 2025-05-23 RX ADMIN — ANASTROZOLE 1 MG: 1 TABLET, COATED ORAL at 09:51

## 2025-05-23 SDOH — ECONOMIC STABILITY: FOOD INSECURITY: WITHIN THE PAST 12 MONTHS, THE FOOD YOU BOUGHT JUST DIDN'T LAST AND YOU DIDN'T HAVE MONEY TO GET MORE.: NEVER TRUE

## 2025-05-23 SDOH — SOCIAL STABILITY: SOCIAL NETWORK: HOW OFTEN DO YOU GET TOGETHER WITH FRIENDS OR RELATIVES?: MORE THAN THREE TIMES A WEEK

## 2025-05-23 SDOH — ECONOMIC STABILITY: INCOME INSECURITY: IN THE PAST 12 MONTHS HAS THE ELECTRIC, GAS, OIL, OR WATER COMPANY THREATENED TO SHUT OFF SERVICES IN YOUR HOME?: NO

## 2025-05-23 SDOH — HEALTH STABILITY: PHYSICAL HEALTH
HOW OFTEN DO YOU NEED TO HAVE SOMEONE HELP YOU WHEN YOU READ INSTRUCTIONS, PAMPHLETS, OR OTHER WRITTEN MATERIAL FROM YOUR DOCTOR OR PHARMACY?: NEVER

## 2025-05-23 SDOH — SOCIAL STABILITY: SOCIAL INSECURITY: WITHIN THE LAST YEAR, HAVE YOU BEEN AFRAID OF YOUR PARTNER OR EX-PARTNER?: NO

## 2025-05-23 SDOH — SOCIAL STABILITY: SOCIAL NETWORK
DO YOU BELONG TO ANY CLUBS OR ORGANIZATIONS SUCH AS CHURCH GROUPS, UNIONS, FRATERNAL OR ATHLETIC GROUPS, OR SCHOOL GROUPS?: NO

## 2025-05-23 SDOH — HEALTH STABILITY: MENTAL HEALTH
DO YOU FEEL STRESS - TENSE, RESTLESS, NERVOUS, OR ANXIOUS, OR UNABLE TO SLEEP AT NIGHT BECAUSE YOUR MIND IS TROUBLED ALL THE TIME - THESE DAYS?: NOT AT ALL

## 2025-05-23 SDOH — SOCIAL STABILITY: SOCIAL INSECURITY: ARE YOU MARRIED, WIDOWED, DIVORCED, SEPARATED, NEVER MARRIED, OR LIVING WITH A PARTNER?: MARRIED

## 2025-05-23 SDOH — ECONOMIC STABILITY: HOUSING INSECURITY: AT ANY TIME IN THE PAST 12 MONTHS, WERE YOU HOMELESS OR LIVING IN A SHELTER (INCLUDING NOW)?: NO

## 2025-05-23 SDOH — SOCIAL STABILITY: SOCIAL INSECURITY: WITHIN THE LAST YEAR, HAVE YOU BEEN HUMILIATED OR EMOTIONALLY ABUSED IN OTHER WAYS BY YOUR PARTNER OR EX-PARTNER?: NO

## 2025-05-23 SDOH — ECONOMIC STABILITY: HOUSING INSECURITY: IN THE PAST 12 MONTHS, HOW MANY TIMES HAVE YOU MOVED WHERE YOU WERE LIVING?: 0

## 2025-05-23 SDOH — ECONOMIC STABILITY: HOUSING INSECURITY: IN THE LAST 12 MONTHS, WAS THERE A TIME WHEN YOU WERE NOT ABLE TO PAY THE MORTGAGE OR RENT ON TIME?: NO

## 2025-05-23 SDOH — ECONOMIC STABILITY: FOOD INSECURITY: WITHIN THE PAST 12 MONTHS, YOU WORRIED THAT YOUR FOOD WOULD RUN OUT BEFORE YOU GOT THE MONEY TO BUY MORE.: NEVER TRUE

## 2025-05-23 SDOH — HEALTH STABILITY: MENTAL HEALTH: HOW OFTEN DO YOU HAVE A DRINK CONTAINING ALCOHOL?: NEVER

## 2025-05-23 SDOH — HEALTH STABILITY: MENTAL HEALTH: HOW MANY DRINKS CONTAINING ALCOHOL DO YOU HAVE ON A TYPICAL DAY WHEN YOU ARE DRINKING?: PATIENT DOES NOT DRINK

## 2025-05-23 SDOH — HEALTH STABILITY: MENTAL HEALTH: HOW OFTEN DO YOU HAVE SIX OR MORE DRINKS ON ONE OCCASION?: NEVER

## 2025-05-23 SDOH — HEALTH STABILITY: PHYSICAL HEALTH: ON AVERAGE, HOW MANY DAYS PER WEEK DO YOU ENGAGE IN MODERATE TO STRENUOUS EXERCISE (LIKE A BRISK WALK)?: 0 DAYS

## 2025-05-23 SDOH — HEALTH STABILITY: PHYSICAL HEALTH: ON AVERAGE, HOW MANY MINUTES DO YOU ENGAGE IN EXERCISE AT THIS LEVEL?: 0 MIN

## 2025-05-23 SDOH — ECONOMIC STABILITY: TRANSPORTATION INSECURITY: IN THE PAST 12 MONTHS, HAS LACK OF TRANSPORTATION KEPT YOU FROM MEDICAL APPOINTMENTS OR FROM GETTING MEDICATIONS?: NO

## 2025-05-23 SDOH — ECONOMIC STABILITY: FOOD INSECURITY: HOW HARD IS IT FOR YOU TO PAY FOR THE VERY BASICS LIKE FOOD, HOUSING, MEDICAL CARE, AND HEATING?: NOT HARD AT ALL

## 2025-05-23 SDOH — SOCIAL STABILITY: SOCIAL NETWORK: HOW OFTEN DO YOU ATTEND MEETINGS OF THE CLUBS OR ORGANIZATIONS YOU BELONG TO?: NEVER

## 2025-05-23 SDOH — SOCIAL STABILITY: SOCIAL NETWORK: HOW OFTEN DO YOU ATTEND CHURCH OR RELIGIOUS SERVICES?: NEVER

## 2025-05-23 SDOH — SOCIAL STABILITY: SOCIAL NETWORK
IN A TYPICAL WEEK, HOW MANY TIMES DO YOU TALK ON THE PHONE WITH FAMILY, FRIENDS, OR NEIGHBORS?: MORE THAN THREE TIMES A WEEK

## 2025-05-23 ASSESSMENT — PAIN SCALES - GENERAL
PAINLEVEL_OUTOF10: 0 - NO PAIN
PAINLEVEL_OUTOF10: 6
PAINLEVEL_OUTOF10: 0 - NO PAIN
PAINLEVEL_OUTOF10: 0 - NO PAIN

## 2025-05-23 ASSESSMENT — COGNITIVE AND FUNCTIONAL STATUS - GENERAL
DAILY ACTIVITIY SCORE: 24
MOBILITY SCORE: 24
DAILY ACTIVITIY SCORE: 24
MOBILITY SCORE: 24

## 2025-05-23 ASSESSMENT — PAIN DESCRIPTION - DESCRIPTORS: DESCRIPTORS: ACHING

## 2025-05-23 ASSESSMENT — PAIN - FUNCTIONAL ASSESSMENT
PAIN_FUNCTIONAL_ASSESSMENT: 0-10

## 2025-05-23 ASSESSMENT — LIFESTYLE VARIABLES
SKIP TO QUESTIONS 9-10: 1
AUDIT-C TOTAL SCORE: 0

## 2025-05-23 ASSESSMENT — PAIN DESCRIPTION - ORIENTATION: ORIENTATION: LOWER

## 2025-05-23 ASSESSMENT — ACTIVITIES OF DAILY LIVING (ADL): LACK_OF_TRANSPORTATION: NO

## 2025-05-23 ASSESSMENT — PAIN DESCRIPTION - LOCATION: LOCATION: BACK

## 2025-05-23 NOTE — PROGRESS NOTES
[facility] ICU Progress Note  Julia Carrion/[id number]    Admit Date: 5/22/2025  Hospital Length of Stay: 1   ICU Length of Stay: 21h   65 y.o. 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  She states that she developed substernal chest pain, shortness of breath, and nausea that started last night.  She slept with her CPAP on and noticed some relief in her symptoms, but this morning when she woke up she still had the same complaints.  She reached out to her PCPs office as she thought the diuretic that she was recently started on could be the cause of the symptoms, she was advised to present to the emergency department.  Code STEMI was activated in the field.      Cardiac Catheterization:  99% occlusion of the proximal LAD with HENNA II flow, LV gram with apical severe wall hypokinesis, 1 HENRY placed to the proximal LAD    SUBJECTIVE:   ORLANDO overnight s/p HENRY to LAD for STEMI.      MEDICATIONS  Infusions:     Scheduled:  anastrozole, 1 mg, Daily  aspirin, 81 mg, Daily  atorvastatin, 40 mg, Nightly  [Held by provider] furosemide, 20 mg, BID  metoprolol succinate XL, 50 mg, Daily  oxygen, , Continuous - 02/gases  oxygen, , Continuous - Inhalation  sacubitriL-valsartan, 1 tablet, BID  ticagrelor, 90 mg, BID      PRN:  acetaminophen, 650 mg, q6h PRN   Or  acetaminophen, 650 mg, q6h PRN   Or  acetaminophen, 650 mg, q6h PRN  nitroglycerin, 0.4 mg, q5 min PRN  traZODone, 100 mg, Nightly PRN        PHYSICAL EXAM:   Visit Vitals  /75   Pulse 81   Temp 36.8 °C (98.2 °F) (Oral)   Resp 21   Ht 1.549 m  "(5' 0.98\")   Wt 75.4 kg (166 lb 3.6 oz)   SpO2 92%   BMI 31.42 kg/m²   OB Status Unknown   Smoking Status Every Day   BSA 1.8 m²     Wt Readings from Last 5 Encounters:   05/22/25 75.4 kg (166 lb 3.6 oz)   05/15/25 76.9 kg (169 lb 6.8 oz)   01/17/25 69.4 kg (153 lb)   12/12/24 69.4 kg (153 lb)   11/13/24 71.8 kg (158 lb 4.6 oz)     INTAKE/OUTPUT:  I/O last 3 completed shifts:  In: - (0 mL/kg)   Out: 855 (11.3 mL/kg) [Urine:850 (0.3 mL/kg/hr); Blood:5]  Weight: 75.4 kg          Vent settings:       Physical Exam:   - CONSTITUTION: NAD  - NEUROLOGIC: non focal, at baseline   - CARDIOVASCULAR: RRR, warm & well perfused   - RESPIRATORY: unablored, CTA   - GI: NT, ND   - : voiding   - EXTREMITIES: Daniel  - SKIN: warms dry   - PSYCHIATRIC: calm & cooperative     Daily Risk Screen  Intubated: no   Central line: no  Day: no       Assessment/Plan   Julia Carrion is a 65 y.o. 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  She states that she developed substernal chest pain, shortness of breath, and nausea that started last night.  She slept with her CPAP on and noticed some relief in her symptoms, but this morning when she woke up she still had the same complaints.  She reached out to her PCPs office as she thought the diuretic that she was recently started on could be the cause of the symptoms, she was advised to present to the emergency department.  Code STEMI was activated in the field.        Cardiac Catheterization:  99% occlusion of the proximal LAD with HENNA II flow, LV gram with apical " severe wall hypokinesis, 1 HENRY placed to the proximal LAD     Interval ICU Events:  5/22: Arrived to ICU with resolution of symptoms post-cardiac cath with HENRY to proximal LAD. TR band removal per protocol. Follow-up TTE. Cpap ordered for tonight.     5/23: ORLANDO overnight, breathing comfortably on RA      Objective  Medical History:  [Medical History]    [Medical History]  Past Medical History       Diagnosis Date    Cancer (Multi)      Deficiency of other specified B group vitamins       Folate deficiency    Personal history of diseases of the blood and blood-forming organs and certain disorders involving the immune mechanism       History of macrocytosis    Personal history of other diseases of the circulatory system       History of hypertension    Personal history of other diseases of the nervous system and sense organs       History of peripheral neuropathy    Personal history of other endocrine, nutritional and metabolic disease       History of hypokalemia    Personal history of transient ischemic attack (TIA), and cerebral infarction without residual deficits       History of stroke    Sleep apnea      Stroke (Multi)       [Surgical History]    [Surgical History]  Past Surgical History        Procedure Laterality Date    BI STEREOTACTIC GUIDED BREAST LEFT LOCALIZATION AND BIOPSY Left 3/28/2022     BI STEREOTACTIC GUIDED BREAST LOCALIZATION AND BIOPSY LEFT [facility]    BI US GUIDED BREAST LOCALIZATION AND BIOPSY RIGHT Right 3/28/2022     BI US GUIDED BREAST LOCALIZATION AND BIOPSY RIGHT [facility]    BI US GUIDED BREAST LOCALIZATION RIGHT Right 9/26/2022     BI US GUIDED BREAST LOCALIZATION RIGHT [facility]    MR HEAD ANGIO WO IV CONTRAST   10/26/2021     MR HEAD ANGIO WO IV CONTRAST [facility]    OTHER SURGICAL HISTORY   01/04/2022     Cataract surgery    OTHER SURGICAL HISTORY   01/04/2022     Hysterectomy    US GUIDED BIOPSY LYMPH NODE SUPERFICIAL   3/28/2022     US  GUIDED BIOPSY LYMPH NODE SUPERFICIAL [facility]     [Prescriptions Prior to Admission]    [Prescriptions Prior to Admission]          Medications Prior to Admission   Medication Sig Dispense Refill Last Dose/Taking    anastrozole (Arimidex) 1 mg tablet Take 1 tablet (1 mg total) by mouth once daily.  Swallow whole with a drink of water. 90 tablet 3      aspirin 81 mg EC tablet Take 1 tablet (81 mg) by mouth once daily.          atorvastatin (Lipitor) 40 mg tablet Take 1 tablet (40 mg) by mouth once daily. 90 tablet 0      cyanocobalamin (Vitamin B-12) 1,000 mcg tablet Take 1 tablet (1,000 mcg) by mouth once daily.          famotidine (Pepcid) 20 mg tablet Take 1 tablet (20 mg) by mouth 2 times a day. 60 tablet 11      furosemide (Lasix) 20 mg tablet Take 1 tablet (20 mg) by mouth 2 times daily (morning and late afternoon). 60 tablet 0      magnesium oxide 400 mg magnesium capsule Take by mouth.          multivitamin tablet Take 1 tablet by mouth once daily.          NON FORMULARY Folic acid 1mg tab daily          traZODone (Desyrel) 100 mg tablet Take 1 tablet (100 mg) by mouth once daily at bedtime. 90 tablet 0       Patient has no known allergies.  [Social History]    [Social History]        Tobacco Use    Smoking status: Every Day       Current packs/day: 1.50       Average packs/day: 1.5 packs/day for 15.0 years (22.5 ttl pk-yrs)       Types: Cigarettes       Passive exposure: Current    Smokeless tobacco: Never   Vaping Use    Vaping status: Never Used   Substance Use Topics    Alcohol use: Yes       Alcohol/week: 21.0 standard drinks of alcohol       Types: 21 Cans of beer per week       Comment: daily    Drug use: Never     [Family History]    [Family History]         Problem Relation Name Age of Onset    Cancer Sister [facility] Medications:     [Continuous Medications]    [Continuous Medications]        [Current Medications]    [Current Medications]     Current Facility-Administered  "Medications:     acetaminophen (Tylenol) tablet 650 mg, 650 mg, oral, q6h PRN **OR** acetaminophen (Tylenol) oral liquid 650 mg, 650 mg, nasogastric tube, q6h PRN **OR** acetaminophen (Tylenol) suppository 650 mg, 650 mg, rectal, q6h PRN, Amarilis Chen APRN-CNP    [START ON 5/23/2025] aspirin EC tablet 81 mg, 81 mg, oral, Daily, Amarilis Chen APRN-CNP    atorvastatin (Lipitor) tablet 40 mg, 40 mg, oral, Nightly, Amarilis Chen APRN-CNP    nitroglycerin (Nitrostat) SL tablet 0.4 mg, 0.4 mg, sublingual, q5 min PRN, KATHERINE Edwards-CNP, 0.4 mg at 05/22/25 1034    oxygen (O2) therapy, , inhalation, Continuous - 02/gases, Amarilis Chen APRN-CNP, 2 L/min at 05/22/25 1430    perflutren lipid microspheres (Definity) injection 0.5-10 mL of dilution, 0.5-10 mL of dilution, intravenous, Once in imaging, Amarilis Chen APRN-CNP    perflutren protein A microsphere (Optison) injection 0.5 mL, 0.5 mL, intravenous, Once in imaging, Amarilis Chen APRN-CNP    sulfur hexafluoride microsphr (Lumason) injection 24.28 mg, 2 mL, intravenous, Once in imaging, Amarilis Chen APRN-CNP    ticagrelor (Brilinta) tablet 90 mg, 90 mg, oral, BID, Amarilis Chen APRN-CNP     Review of Systems:  14 point review of systems was completed and negative except for those specially mention in my HPI     Physical Exam:     Heart Rate:  [76-92]   Temp:  [36.7 °C (98.1 °F)-36.9 °C (98.4 °F)]   Resp:  [13-31]   BP: ()/()   Height:  [154.9 cm (5' 0.98\")-154.9 cm (5' 1\")]   Weight:  [75.4 kg (166 lb 3.6 oz)-77 kg (169 lb 12.1 oz)]   SpO2:  [89 %-100 %]      Physical Exam  Constitutional:       General: She is not in acute distress.     Appearance: She is not ill-appearing.   HENT:      Mouth/Throat:      Mouth: Mucous membranes are dry.   Eyes:      Extraocular Movements: Extraocular movements intact.      Conjunctiva/sclera: Conjunctivae normal.      Pupils: Pupils are equal, round, and reactive to light.   Cardiovascular: "      Rate and Rhythm: Normal rate and regular rhythm.      Pulses: Normal pulses.      Heart sounds: Normal heart sounds.   Pulmonary:      Effort: Pulmonary effort is normal. No respiratory distress.   Abdominal:      General: There is no distension.      Palpations: Abdomen is soft.      Tenderness: There is no abdominal tenderness.   Musculoskeletal:      Right lower leg: Edema present.      Left lower leg: Edema present.   Skin:     General: Skin is warm and dry.      Coloration: Skin is pale.   Neurological:      General: No focal deficit present.      Mental Status: She is alert and oriented to person, place, and time. Mental status is at baseline.            Objective:     I have reviewed all medications, laboratory results, and imaging pertinent for today's encounter.           Intake/Output Summary (Last 24 hours) at 5/22/2025 1707  Last data filed at 5/22/2025 1140      Gross per 24 hour   Intake --   Output 5 ml   Net -5 ml         Daily Labs:  CBC:        Results from last 7 days   Lab Units 05/22/25  1021   WBC AUTO x10*3/uL 14.3*   HEMOGLOBIN g/dL 15.4   HEMATOCRIT % 45.1   MCV fL 95      BMP:         Results from last 7 days   Lab Units 05/22/25  1021   SODIUM mmol/L 136   POTASSIUM mmol/L 4.1   CHLORIDE mmol/L 99   CO2 mmol/L 25   BUN mg/dL 13   CREATININE mg/dL 0.92   CALCIUM mg/dL 9.6   GLUCOSE mg/dL 144*         Assessment/Plan:     I am currently managing this critically ill patient for the following problems:     Neurology/Psychiatry/Pain Control/Sedation:   History of chronic back pain, degenerative disc disease, tobacco abuse  - Pain Control: acetaminophen PRN  - Smoking cessation advised   - OOB!     Respiratory/ENT:  History of DEVYN  - Supplemental O2: none, on room air   -- CPAP hs   - Maintain SpO2 >92%  - Continuous pulse ox monitoring   - Pulm hygiene, will provide and educate on IS     Cardiovascular:   Acute ST elevated myocardial infarction s/p HENRY to proximal LAD  Ischemic  cardiomyopathy - LV gram w LVEF 40% and apical anterior wall hypokinesis   History of hypertension, hyperlipidemia  - Continue aspirin, brilinta, atorva   - Resume home furosemide 20mg BID   - Continue metoprolol xl 50mg daily   - Start low does entresto per Cardiology   - Continuous cardiac monitoring   - EKGs PRN for ACS symptoms, arrhythmias      Gastrointestinal:  Transaminitis 2.2 hepatic congestion iso STEMI  History of GERD  - Diet: cardiac   - BR: PRN  - GI Prophylaxis: home pepcid      Renal/Volume Status/Electrolytes:  No active concerns  - Hourly I/O's, maintain urine output >0.5cc/kg/hr  - Replete electrolytes to maintain K >4.0 and Mg >2.0  - Daily RFP, Mg    Endocrinology:  Hyperglycemia without PMHx   - Consider adding SSI if glucose persistently >180  - Hypoglycemia protocol PRN   - F/U A1c      Infectious Disease:  Leukocytosis, stress-induced  - Monitor SIRS criteria     Hematology/Oncology:  History of breast cancer  - Can hold home anastrozole for now, would resume if staying inpatient for a few days   - Transfuse if Hgb <8 or symptomatic anemia  - Daily CBC     MSK/Skin:  History of remote CVA with residual left lower extremity weakness  - PT/OT eval   - ICU skin protocol, padded pressure points     Ethics/Code Status:  Full code     :  DVT Prophylaxis: SCDs  GI Prophylaxis: home pepcid  Bowel Regimen: PRN  Diet: cardiac  CVC: none  Violette: none  Day: none  Restraints: none     Code status: No Order  Emergency contact:  Florentin SANDHU personally spent 25 minutes of critical care time directly and personally managing the patient exclusive of separately billable procedures.     A,B,C,D,E,F,G: reviewed     Dispo: transfer to stepdown when bed available

## 2025-05-23 NOTE — PROGRESS NOTES
"TCC met with patient. Assessment complete. Patient lives in a house with her spouse and their two sons. Patient is independent. Patient wears CPAP at bedtime. Patient uses a walker. Patient does not drive, her spouse does the transporting. Patient able to shop, cook and clean. Patient smokes 1.5 PPD and drinks a \"couple of beers a day.\" Patient follows Dr. India Johnston. Patient fills prescriptions at [facility] in [place]. At this time the discharge plan is for patient to return home with no skilled services. Will follow.      05/23/25 1511   Discharge Planning   Living Arrangements Spouse/significant other;Children   Support Systems Spouse/significant other;Children   Type of Residence Private residence   Home or Post Acute Services None   Expected Discharge Disposition Home   Does the patient need discharge transport arranged? No   Financial Resource Strain   How hard is it for you to pay for the very basics like food, housing, medical care, and heating? Not hard   Housing Stability   In the last 12 months, was there a time when you were not able to pay the mortgage or rent on time? N   In the past 12 months, how many times have you moved where you were living? 0   At any time in the past 12 months, were you homeless or living in a shelter (including now)? N   Transportation Needs   In the past 12 months, has lack of transportation kept you from medical appointments or from getting medications? no   In the past 12 months, has lack of transportation kept you from meetings, work, or from getting things needed for daily living? No       "

## 2025-05-23 NOTE — CARE PLAN
The patient's goals for the shift include      The clinical goals for the shift include no arrhythmias, stable vitals and labs, increase mobility as tolerated    Over the shift, the patient made progress toward the following goals:      Problem: Pain - Adult  Goal: Verbalizes/displays adequate comfort level or baseline comfort level  Outcome: Progressing  Flowsheets (Taken 5/23/2025 1536)  Verbalizes/displays adequate comfort level or baseline comfort level:   Encourage patient to monitor pain and request assistance   Assess pain using appropriate pain scale   Administer analgesics based on type and severity of pain and evaluate response   Implement non-pharmacological measures as appropriate and evaluate response   Consider cultural and social influences on pain and pain management   Notify Licensed Independent Practitioner if interventions unsuccessful or patient reports new pain     Problem: Safety - Adult  Goal: Free from fall injury  Outcome: Progressing  Flowsheets (Taken 5/23/2025 1536)  Free from fall injury: Instruct family/caregiver on patient safety     Problem: Discharge Planning  Goal: Discharge to home or other facility with appropriate resources  Outcome: Progressing  Flowsheets (Taken 5/23/2025 1536)  Discharge to home or other facility with appropriate resources: Identify barriers to discharge with patient and caregiver     Problem: Chronic Conditions and Co-morbidities  Goal: Patient's chronic conditions and co-morbidity symptoms are monitored and maintained or improved  Outcome: Progressing  Flowsheets (Taken 5/23/2025 1536)  Care Plan - Patient's Chronic Conditions and Co-Morbidity Symptoms are Monitored and Maintained or Improved:   Monitor and assess patient's chronic conditions and comorbid symptoms for stability, deterioration, or improvement   Collaborate with multidisciplinary team to address chronic and comorbid conditions and prevent exacerbation or deterioration   Update acute care plan  with appropriate goals if chronic or comorbid symptoms are exacerbated and prevent overall improvement and discharge     Problem: Nutrition  Goal: Nutrient intake appropriate for maintaining nutritional needs  Outcome: Progressing     Problem: Skin  Goal: Participates in plan/prevention/treatment measures  Outcome: Progressing  Flowsheets (Taken 5/23/2025 1536)  Participates in plan/prevention/treatment measures:   Discuss with provider PT/OT consult   Elevate heels  Goal: Prevent/manage excess moisture  Outcome: Progressing  Goal: Prevent/minimize sheer/friction injuries  Outcome: Progressing  Flowsheets (Taken 5/23/2025 1536)  Prevent/minimize sheer/friction injuries:   Complete micro-shifts as needed if patient unable. Adjust patient position to relieve pressure points, not a full turn   Increase activity/out of bed for meals   HOB 30 degrees or less   Turn/reposition every 2 hours/use positioning/transfer devices   Use pull sheet   Utilize specialty bed per algorithm  Goal: Promote/optimize nutrition  Outcome: Progressing  Flowsheets (Taken 5/23/2025 1536)  Promote/optimize nutrition:   Monitor/record intake including meals   Offer water/supplements/favorite foods  Goal: Promote skin healing  Outcome: Progressing  Flowsheets (Taken 5/23/2025 1536)  Promote skin healing:   Assess skin/pad under line(s)/device(s)   Ensure correct size (line/device) and apply per  instructions   Protective dressings over bony prominences   Rotate device position/do not position patient on device   Turn/reposition every 2 hours/use positioning/transfer devices     Problem: ACS/CP/NSTEMI/STEMI  Goal: Lab values return to normal range  Outcome: Progressing  Flowsheets (Taken 5/23/2025 1536)  Lab values return to normal range:   Monitor for signs bleeding/correct coagulopathy   Monitor I&O, weight, labs  Goal: Promote self management  Outcome: Progressing  Flowsheets (Taken 5/23/2025 1536)  Promote self management:    Encourage activity/cluster activity to conserve energy   Monitor for depression/anxiety/coping ability   Promote nutrition/identify dietary restrictions  Goal: Serial ECG will return to baseline  Outcome: Progressing  Flowsheets (Taken 5/23/2025 1536)  Serial ECG will return to baseline: Monitor vitals/rhythm/ECG  Goal: Verbalize understanding of procedures/devices  Outcome: Progressing  Flowsheets (Taken 5/23/2025 1536)  Verbalize understanding of procedures/devices:   Monitor effectiveness of supportive devices   Provide education     Problem: Cardiac catheterization  Goal: Free from dysrhythmias  Outcome: Progressing  Flowsheets (Taken 5/23/2025 1536)  Free from dysrhythmias:   Monitor vitals/rhythm/ECG   Monitor/manage medications and effect  Goal: Free from pain  Outcome: Progressing  Flowsheets (Taken 5/23/2025 1536)  Free from pain:   Eliminate/minimize actual/potential pain   Monitor/manage medications and effect  Goal: No evidence of post procedure complications  Outcome: Progressing  Flowsheets (Taken 5/23/2025 1536)  No evidence of post procedure complications:   Monitor for signs bleeding/correct coagulopathy   Monitor I&O, weight, labs  Goal: Promote self management  Outcome: Progressing  Flowsheets (Taken 5/23/2025 1536)  Promote self management:   Increase activity per sheath restrictions/tolerance   Promote nutrition/identify dietary restrictions  Goal: Verbalize understanding of procedure  Outcome: Progressing  Flowsheets (Taken 5/23/2025 1536)  Verbalize understanding of procedure: Provide education

## 2025-05-23 NOTE — NURSING NOTE
Patient arrived up to the floor from the ICU with family friend present at bedside. Patient denying any pain at this time. Placed on our heart monitor. Oriented patient to the unit as well as how to use call light system. Patient denied any other needs at this time and call light is in reach. Patient ambulates with a walker and has some baseline weakness on her left side due to stroke in the past.

## 2025-05-23 NOTE — NURSING NOTE
Patient sitting up in bed denied any needs at this time. She said she is tired and asked if respiratory can come and hook her up to her home CPAP machine as her friend brought it in for her. I told her I would reach out and ask. She says she does not want to eat anything for dinner as she had a late lunch.

## 2025-05-23 NOTE — PROGRESS NOTES
"Spiritual Care Visit  Spiritual Care Request    Reason for Visit:  Routine Visit: Introduction     Request Received From:       Focus of Care:  Visited With: Patient         Refer to :          Spiritual Care Assessment    Spiritual Assessment:                      Care Provided:  Intended Effects: Establish rapport and connectedness, Build relationship of care and support, Convey a calming presence, Demonstrate caring and concern, Lessen someone's feelings of isolation  Methods: Offer emotional support  Interventions: Explain  role    Sense of Community and or Gnosticist Affiliation:  None         Addressed Needs/Concerns and/or Adela Through:          Outcome:  Outcome of Spiritual Care Visit: Identifying spiritual/emotional issues, Comfort/healing presence, Support system identified     Advance Directives:         Spiritual Care Annotation        Annotation:  provided patient support while rounding the Unit.  introduced  services of [facility].   explained the role of the  in providing emotional and spiritual support for patient's and family while in admitted to the hospital.    Patient was resting slightly  inclined in the hospital bed. Patient appears comfortable. Patient welcomed the visit today, and indicated that she was doing much better. Patient's support system consist of her family, patient said that she has had multiple issues, which her family \"was not surprised by a heart attack\"  Patient expressed gratefulness for the visit but had no needs at this time.      No spiritual or Restorationist needs were expressed. Spiritual care will remain available for support as requested.         "

## 2025-05-23 NOTE — PROGRESS NOTES
05/23/25 1511   Physical Activity   On average, how many days per week do you engage in moderate to strenuous exercise (like a brisk walk)? 0 days   On average, how many minutes do you engage in exercise at this level? 0 min   Financial Resource Strain   How hard is it for you to pay for the very basics like food, housing, medical care, and heating? Not hard   Housing Stability   In the last 12 months, was there a time when you were not able to pay the mortgage or rent on time? N   In the past 12 months, how many times have you moved where you were living? 0   At any time in the past 12 months, were you homeless or living in a shelter (including now)? N   Transportation Needs   In the past 12 months, has lack of transportation kept you from medical appointments or from getting medications? no   In the past 12 months, has lack of transportation kept you from meetings, work, or from getting things needed for daily living? No   Food Insecurity   Within the past 12 months, you worried that your food would run out before you got the money to buy more. Never true   Within the past 12 months, the food you bought just didn't last and you didn't have money to get more. Never true   Stress   Do you feel stress - tense, restless, nervous, or anxious, or unable to sleep at night because your mind is troubled all the time - these days? Not at all   Social Connections   In a typical week, how many times do you talk on the phone with family, friends, or neighbors? More than 3   How often do you get together with friends or relatives? More than 3   How often do you attend Nondenominational or Jehovah's witness services? Never   Do you belong to any clubs or organizations such as Nondenominational groups, unions, fraternal or athletic groups, or school groups? No   How often do you attend meetings of the clubs or organizations you belong to? Never   Are you , , , , never , or living with a partner?    Intimate  Partner Violence   Within the last year, have you been afraid of your partner or ex-partner? No   Within the last year, have you been humiliated or emotionally abused in other ways by your partner or ex-partner? No   Within the last year, have you been kicked, hit, slapped, or otherwise physically hurt by your partner or ex-partner? No   Within the last year, have you been raped or forced to have any kind of sexual activity by your partner or ex-partner? No   Alcohol Use   Q1: How often do you have a drink containing alcohol? Never   Q2: How many drinks containing alcohol do you have on a typical day when you are drinking? None   Q3: How often do you have six or more drinks on one occasion? Never   Utilities   In the past 12 months has the electric, gas, oil, or water company threatened to shut off services in your home? No   Health Literacy   How often do you need to have someone help you when you read instructions, pamphlets, or other written material from your doctor or pharmacy? Never   Follow-Ups   We make community resources available to all of our patients to assist with everyday needs. We may be able to connect you with those resources. Would you be interested? N

## 2025-05-23 NOTE — PROGRESS NOTES
Subjective Data:      Overnight Events:         Objective Data:  Last Recorded Vitals:  Vitals:    05/23/25 0600 05/23/25 0700 05/23/25 0800 05/23/25 0815   BP: 113/69 117/77 132/75    BP Location:       Patient Position:       Pulse: 68 83 81    Resp: 17 21 21    Temp:    36.8 °C (98.2 °F)   TempSrc:    Oral   SpO2: 95% 97% 93% 92%   Weight:       Height:           Last Labs:  CBC - 5/23/2025:  4:48 AM  11.7 14.0 185    41.4      CMP - 5/23/2025:  4:48 AM  9.1 7.0 144 --- 0.7   3.2 4.0 36 85      PTT - 5/22/2025: 10:21 AM  1.0   11.2 34.6     TROPHS   Date/Time Value Ref Range Status   05/22/2025 01:32 PM 49,951 0 - 13 ng/L Final     Comment:     Previous result verified on 5/22/2025 1116 on specimen/case [id number] called with component [facility] for procedure Troponin I, High Sensitivity, Initial with value 54,983 ng/L.   05/22/2025 10:21 AM 54,983 0 - 13 ng/L Final     BNP   Date/Time Value Ref Range Status   05/22/2025 10:21  0 - 99 pg/mL Final     HGBA1C   Date/Time Value Ref Range Status   10/26/2021 06:22 AM 5.4 4.0 - 6.0 % Final     Comment:     Hemoglobin A1C levels are related to mean blood glucose during the   preceding 2-3 months. The relationship table below may be used as a   general guide. Each 1% increase in HGB A1C is a reflection of an   increase in mean glucose of approximately 30 mg/dl.   Reference: Diabetes Care, volume 29, supplement 1 Jan. 2006                        HGB A1C ................. Approx. Mean Glucose   _______________________________________________   6%   ...............................  120 mg/dl   7%   ...............................  150 mg/dl   8%   ...............................  180 mg/dl   9%   ...............................  210 mg/dl   10%  ...............................  240 mg/dl  Performed at [address] [address]     10/10/2021 05:04 AM 5.6 4.3 - 5.6 % Final     Comment:     American Diabetes Association guidelines indicate that  patients with HgbA1c in   the range 5.7-6.4% are at increased risk for development of diabetes, and   intervention by lifestyle modification may be beneficial. HgbA1c greater or   equal to 6.5% is considered diagnostic of diabetes.     LDLCALC   Date/Time Value Ref Range Status   05/23/2025 04:48 AM 48 <=99 mg/dL Final     Comment:                                 Near   Borderline      AGE      Desirable  Optimal    High     High     Very High     0-19 Y     0 - 109     ---    110-129   >/= 130     ----    20-24 Y     0 - 119     ---    120-159   >/= 160     ----      >24 Y     0 -  99   100-129  130-159   160-189     >/=190     10/26/2021 06:22 AM 83 65 - 130 MG/DL Final     VLDL   Date/Time Value Ref Range Status   05/23/2025 04:48 AM 41 0 - 40 mg/dL Final      Last I/O:  I/O last 3 completed shifts:  In: - (0 mL/kg)   Out: 855 (11.3 mL/kg) [Urine:850 (0.3 mL/kg/hr); Blood:5]  Weight: 75.4 kg     Past Cardiology Tests (Last 3 Years):  EKG:  ECG 12 Lead 05/23/2025 (Preliminary)      Electrocardiogram 12 Lead 05/22/2025 (Preliminary)      ECG 12 Lead 08/19/2024    Echo:  Transthoracic Echo Complete 05/22/2025    Ejection Fractions:  EF   Date/Time Value Ref Range Status   05/22/2025 01:31 PM 38 %      Cath:  No results found for this or any previous visit from the past 1095 days.    Stress Test:  No results found for this or any previous visit from the past 1095 days.    Cardiac Imaging:  No results found for this or any previous visit from the past 1095 days.      Inpatient Medications:  Scheduled Medications[1]  PRN Medications[2]  Continuous Medications[3]    Physical Exam:  General: Patient is in no acute distress.  HEENT: atraumatic normocephalic.  Neck: is supple jugular venous pressure within normal limits no thyromegaly.  Cardiovascular regular rate and rhythm normal heart sounds no murmurs rubs or gallops.  Lungs: Scattered rhonchi   abdomen: is soft nontender.  Extremities warm to touch no  edema.  Neurologic examination: patient is awake alert oriented to person, place, date/time.  Psychiatric examination: patient has good insight denies feeling suicidal and depressed.  Pulses 2+ intact bilaterally     Assessment/Plan   5/22:  #1 acute ST elevation myocardial infarction status post PCI to proximal LAD with drug-eluting stent with excellent result.  Patient does have moderately reduced ejection fraction of 40% with apical anterior wall hypokinesis.  Recommend aspirin Brilinta high intensity statin.  We will obtain echocardiogram.  Discussed with patient lifestyle modification smoking cessation.     2.  Hypertension.  Will start patient on beta-blockers as well as ACE inhibitors for anterior STEMI with severe hypokinesis of anterior wall.  Will monitor.     3.  Hyperlipidemia recommend high intensity statin.     4.  Elevated liver function test likely secondary to acute myocardial infarction we will elevated troponin of 54,000 AST probably reflection of her myocardial infarction and delayed presentation of more than 12 hours.     5.  COPD.  Will consult ICU team for input and optimization of her treatment.     6.  Smoking cessation discussed with patient.      5/23: The patient is sitting in bedside chair feeling well without any residual chest discomfort.  She denies shortness of breath.  Telemetry monitor demonstrates sinus rhythm primarily in the 80/min range.  Systolic blood pressures ranging from 115-130 mmHg.  EKG demonstrates poor R wave progression in the precordial leads with symmetric T wave inversions throughout the precordial leads and lateral limb leads consistent with an anterior apical myocardial infarction.  The patient's echocardiogram performed yesterday shows severe hypokinesis of the mid and distal anteroseptal wall and the entire LV apex with a reduced LV ejection fraction of 35-40%.  No clinical evidence for CHF.  Patient will be placed on low-dose Entresto 24/26 mg twice daily.   She will remain on aspirin and Brilinta dual antiplatelet therapy.  Lipid panel from earlier today includes a cholesterol 128 and will continue atorvastatin 40 mg daily for now pending further outpatient lipid panels.  Patient stable for stepdown unit transfer would observe for another 24 hours.  CBC is unremarkable with a WBC of 11,700.  The comprehensive metabolic panel is also essentially unremarkable other than for declining AST of 144 from her infarction.    Peripheral IV 05/22/25 18 G Right Antecubital (Active)   Site Assessment Clean;Dry;Intact 05/23/25 0400   Dressing Type Transparent 05/23/25 0400   Line Status Flushed;Saline locked 05/23/25 0400   Dressing Status Clean;Dry;Occlusive 05/23/25 0400   Number of days: 1       Peripheral IV 05/22/25 20 G Anterior;Right Forearm (Active)   Site Assessment Clean;Dry;Intact 05/23/25 0400   Dressing Type Transparent 05/23/25 0400   Line Status Flushed;Saline locked 05/23/25 0400   Dressing Status Clean;Dry;Occlusive 05/23/25 0400   Number of days: 1       Code Status:  No Order    I spent 30 minutes in the professional and overall care of this patient.        Clay Nickerson MD       [1]   Scheduled medications   Medication Dose Route Frequency    [Held by provider] anastrozole  1 mg oral Daily    aspirin  81 mg oral Daily    atorvastatin  40 mg oral Nightly    [Held by provider] furosemide  20 mg oral BID    oxygen   inhalation Continuous - 02/gases    oxygen   inhalation Continuous - Inhalation    ticagrelor  90 mg oral BID   [2]   PRN medications   Medication    acetaminophen    Or    acetaminophen    Or    acetaminophen    nitroglycerin    traZODone   [3]   Continuous Medications   Medication Dose Last Rate

## 2025-05-23 NOTE — NURSING NOTE
Possible need for Home Care Service was communicated by Felicia NG CM on 7/8/22.  Choice was offered and patient chose [facility].    Spoke with patient at bedside regarding potential therapy recommendations.   The patient will benefit from home care services based on s/p LUMBAR 2-LUMBAR 3 AND LUMBAR 3-LUMBAR 4 LAMINECTOMY WITH PARITAL FACETECTOMY AND FORAMINOTOMY.  Information provided to patient and will await final recommendations/orders.     Risk of readmission is : HIGH RISK 71% (based on Longitudinal Plan of Care score)    Patient meets homebound criteria because s/p surgery with high risk for infection, pain.    Verbal and written information  provided to patient along with [facility] contact information. Teach back demonstrated by patient.     Address, phone number, and insurance verified with patient as per Epic records.    Active with a skilled home health agency prior to admission No (Yes/No).   PCP verified with patient as Dr. Melgar.    Anticipated dc date is 7/8 or 7/9 per patient. If home care recommended/MD agreeable, ORDERS WILL NEED TO BE PLACED prior to DC.     ADDENDUM 7/8/22 1412:  Per Devin HENLEY, [facility] therapies are not recommending ongoing home therapies at this time and home care will NOT be ordered.      Renata Hall RN   Home Care Liaison   [facility]  [phone]          Stent education/card/handout given to patient/. Discussed importance of taking medication as prescribed/following up with physician appointments. STEMI education/handout given to patient/. Discussed benefits of heart healthy diet/importance of maintaining a healthy weight. Smoking cessation education/handout given to patient/. Discussed cardiac rehab. Patient encouraged to call cardiac rehab post discharge.

## 2025-05-23 NOTE — CARE PLAN
The patient's goals for the shift include      The clinical goals for the shift include remain hemodynamically stable      Problem: Pain - Adult  Goal: Verbalizes/displays adequate comfort level or baseline comfort level  Outcome: Progressing     Problem: Safety - Adult  Goal: Free from fall injury  Outcome: Progressing     Problem: Discharge Planning  Goal: Discharge to home or other facility with appropriate resources  Outcome: Progressing     Problem: Chronic Conditions and Co-morbidities  Goal: Patient's chronic conditions and co-morbidity symptoms are monitored and maintained or improved  Outcome: Progressing     Problem: Nutrition  Goal: Nutrient intake appropriate for maintaining nutritional needs  Outcome: Progressing     Problem: Skin  Goal: Decreased wound size/increased tissue granulation at next dressing change  Outcome: Progressing  Flowsheets (Taken 5/23/2025 0621)  Decreased wound size/increased tissue granulation at next dressing change:   Promote sleep for wound healing   Utilize specialty bed per algorithm   Protective dressings over bony prominences  Goal: Participates in plan/prevention/treatment measures  Outcome: Progressing  Flowsheets (Taken 5/23/2025 0621)  Participates in plan/prevention/treatment measures:   Increase activity/out of bed for meals   Discuss with provider PT/OT consult   Elevate heels  Goal: Prevent/manage excess moisture  Outcome: Progressing  Flowsheets (Taken 5/23/2025 0621)  Prevent/manage excess moisture:   Cleanse incontinence/protect with barrier cream   Moisturize dry skin   Use wicking fabric (obtain order)  Goal: Prevent/minimize sheer/friction injuries  Outcome: Progressing  Flowsheets (Taken 5/23/2025 0621)  Prevent/minimize sheer/friction injuries:   Turn/reposition every 2 hours/use positioning/transfer devices   Use pull sheet   Utilize specialty bed per algorithm   Increase activity/out of bed for meals   HOB 30 degrees or less  Goal: Promote/optimize  nutrition  Outcome: Progressing  Flowsheets (Taken 5/23/2025 0621)  Promote/optimize nutrition:   Offer water/supplements/favorite foods   Discuss with provider if NPO > 2 days   Reassess MST if dietician not consulted  Goal: Promote skin healing  Outcome: Progressing  Flowsheets (Taken 5/23/2025 0621)  Promote skin healing:   Assess skin/pad under line(s)/device(s)   Ensure correct size (line/device) and apply per  instructions   Protective dressings over bony prominences   Rotate device position/do not position patient on device   Turn/reposition every 2 hours/use positioning/transfer devices     Problem: ACS/CP/NSTEMI/STEMI  Goal: Chest pain managed (free from pain or at acceptable level)  Outcome: Progressing  Goal: Lab values return to normal range  Outcome: Progressing  Goal: Promote self management  Outcome: Progressing  Goal: Serial ECG will return to baseline  Outcome: Progressing  Goal: Verbalize understanding of procedures/devices  Outcome: Progressing  Goal: Wean vasopressors/achieve hemodynamic stability  Outcome: Progressing     Problem: Cardiac catheterization  Goal: Free from dysrhythmias  Outcome: Progressing  Goal: Free from pain  Outcome: Progressing  Goal: No evidence of post procedure complications  Outcome: Progressing  Goal: Promote self management  Outcome: Progressing  Goal: Verbalize understanding of procedure  Outcome: Progressing  Goal: Care and maintenance of device (specify)  Outcome: Progressing

## 2025-05-24 ENCOUNTER — PHARMACY VISIT (OUTPATIENT)
Dept: PHARMACY | Facility: CLINIC | Age: 65
End: 2025-05-24
Payer: MEDICARE

## 2025-05-24 ENCOUNTER — APPOINTMENT (OUTPATIENT)
Dept: CARDIOLOGY | Facility: HOSPITAL | Age: 65
DRG: 281 | End: 2025-05-24
Payer: MEDICARE

## 2025-05-24 VITALS
SYSTOLIC BLOOD PRESSURE: 102 MMHG | WEIGHT: 167.55 LBS | HEART RATE: 76 BPM | OXYGEN SATURATION: 95 % | TEMPERATURE: 98.2 F | HEIGHT: 65 IN | DIASTOLIC BLOOD PRESSURE: 67 MMHG | RESPIRATION RATE: 16 BRPM | BODY MASS INDEX: 27.92 KG/M2

## 2025-05-24 PROBLEM — I21.3 ST ELEVATION MYOCARDIAL INFARCTION (STEMI), UNSPECIFIED ARTERY (MULTI): Status: RESOLVED | Noted: 2025-05-22 | Resolved: 2025-05-24

## 2025-05-24 LAB
ALBUMIN SERPL BCP-MCNC: 4 G/DL (ref 3.4–5)
ALP SERPL-CCNC: 80 U/L (ref 33–136)
ALT SERPL W P-5'-P-CCNC: 26 U/L (ref 7–45)
ANION GAP SERPL CALCULATED.3IONS-SCNC: 11 MMOL/L (ref 10–20)
AST SERPL W P-5'-P-CCNC: 62 U/L (ref 9–39)
BASOPHILS # BLD AUTO: 0.04 X10*3/UL (ref 0–0.1)
BASOPHILS NFR BLD AUTO: 0.4 %
BILIRUB SERPL-MCNC: 0.7 MG/DL (ref 0–1.2)
BUN SERPL-MCNC: 25 MG/DL (ref 6–23)
CALCIUM SERPL-MCNC: 8.7 MG/DL (ref 8.6–10.3)
CHLORIDE SERPL-SCNC: 103 MMOL/L (ref 98–107)
CO2 SERPL-SCNC: 26 MMOL/L (ref 21–32)
CREAT SERPL-MCNC: 1.16 MG/DL (ref 0.5–1.05)
EGFRCR SERPLBLD CKD-EPI 2021: 52 ML/MIN/1.73M*2
EOSINOPHIL # BLD AUTO: 0.16 X10*3/UL (ref 0–0.7)
EOSINOPHIL NFR BLD AUTO: 1.5 %
ERYTHROCYTE [DISTWIDTH] IN BLOOD BY AUTOMATED COUNT: 12.5 % (ref 11.5–14.5)
GLUCOSE SERPL-MCNC: 121 MG/DL (ref 74–99)
HCT VFR BLD AUTO: 40.3 % (ref 36–46)
HGB BLD-MCNC: 13.8 G/DL (ref 12–16)
IMM GRANULOCYTES # BLD AUTO: 0.05 X10*3/UL (ref 0–0.7)
IMM GRANULOCYTES NFR BLD AUTO: 0.5 % (ref 0–0.9)
LYMPHOCYTES # BLD AUTO: 1.96 X10*3/UL (ref 1.2–4.8)
LYMPHOCYTES NFR BLD AUTO: 18.2 %
MCH RBC QN AUTO: 32.2 PG (ref 26–34)
MCHC RBC AUTO-ENTMCNC: 34.2 G/DL (ref 32–36)
MCV RBC AUTO: 94 FL (ref 80–100)
MONOCYTES # BLD AUTO: 1.09 X10*3/UL (ref 0.1–1)
MONOCYTES NFR BLD AUTO: 10.1 %
NEUTROPHILS # BLD AUTO: 7.45 X10*3/UL (ref 1.2–7.7)
NEUTROPHILS NFR BLD AUTO: 69.3 %
NRBC BLD-RTO: 0 /100 WBCS (ref 0–0)
PLATELET # BLD AUTO: 186 X10*3/UL (ref 150–450)
POTASSIUM SERPL-SCNC: 3.7 MMOL/L (ref 3.5–5.3)
PROT SERPL-MCNC: 6.8 G/DL (ref 6.4–8.2)
RBC # BLD AUTO: 4.28 X10*6/UL (ref 4–5.2)
SODIUM SERPL-SCNC: 136 MMOL/L (ref 136–145)
WBC # BLD AUTO: 10.8 X10*3/UL (ref 4.4–11.3)

## 2025-05-24 PROCEDURE — 80053 COMPREHEN METABOLIC PANEL: CPT | Performed by: NURSE PRACTITIONER

## 2025-05-24 PROCEDURE — 2500000004 HC RX 250 GENERAL PHARMACY W/ HCPCS (ALT 636 FOR OP/ED): Mod: JZ

## 2025-05-24 PROCEDURE — RXMED WILLOW AMBULATORY MEDICATION CHARGE

## 2025-05-24 PROCEDURE — 85025 COMPLETE CBC W/AUTO DIFF WBC: CPT | Performed by: NURSE PRACTITIONER

## 2025-05-24 PROCEDURE — 93010 ELECTROCARDIOGRAM REPORT: CPT | Performed by: INTERNAL MEDICINE

## 2025-05-24 PROCEDURE — 2500000001 HC RX 250 WO HCPCS SELF ADMINISTERED DRUGS (ALT 637 FOR MEDICARE OP): Performed by: NURSE PRACTITIONER

## 2025-05-24 PROCEDURE — 93005 ELECTROCARDIOGRAM TRACING: CPT

## 2025-05-24 PROCEDURE — 2500000002 HC RX 250 W HCPCS SELF ADMINISTERED DRUGS (ALT 637 FOR MEDICARE OP, ALT 636 FOR OP/ED): Performed by: NURSE PRACTITIONER

## 2025-05-24 PROCEDURE — 2500000004 HC RX 250 GENERAL PHARMACY W/ HCPCS (ALT 636 FOR OP/ED): Performed by: NURSE PRACTITIONER

## 2025-05-24 PROCEDURE — 99238 HOSP IP/OBS DSCHRG MGMT 30/<: CPT

## 2025-05-24 PROCEDURE — 36415 COLL VENOUS BLD VENIPUNCTURE: CPT | Performed by: NURSE PRACTITIONER

## 2025-05-24 RX ORDER — METOPROLOL SUCCINATE 50 MG/1
50 TABLET, EXTENDED RELEASE ORAL DAILY
Qty: 30 TABLET | Refills: 2 | Status: CANCELLED | OUTPATIENT
Start: 2025-05-24

## 2025-05-24 RX ORDER — TICAGRELOR 90 MG/1
90 TABLET, FILM COATED ORAL 2 TIMES DAILY
Qty: 60 TABLET | Refills: 11 | Status: SHIPPED | OUTPATIENT
Start: 2025-05-24

## 2025-05-24 RX ORDER — METOPROLOL SUCCINATE 50 MG/1
50 TABLET, EXTENDED RELEASE ORAL DAILY
Qty: 30 TABLET | Refills: 2 | Status: SHIPPED | OUTPATIENT
Start: 2025-05-25

## 2025-05-24 RX ORDER — SODIUM CHLORIDE 9 MG/ML
100 INJECTION, SOLUTION INTRAVENOUS CONTINUOUS
Status: ACTIVE | OUTPATIENT
Start: 2025-05-24 | End: 2025-05-24

## 2025-05-24 RX ORDER — SODIUM CHLORIDE 9 MG/ML
50 INJECTION, SOLUTION INTRAVENOUS CONTINUOUS
Status: DISCONTINUED | OUTPATIENT
Start: 2025-05-24 | End: 2025-05-24

## 2025-05-24 RX ORDER — NITROGLYCERIN 0.4 MG/1
0.4 TABLET SUBLINGUAL EVERY 5 MIN PRN
Qty: 90 TABLET | Refills: 12 | Status: SHIPPED | OUTPATIENT
Start: 2025-05-24

## 2025-05-24 RX ADMIN — ASPIRIN 81 MG: 81 TABLET, COATED ORAL at 08:15

## 2025-05-24 RX ADMIN — ANASTROZOLE 1 MG: 1 TABLET, COATED ORAL at 08:15

## 2025-05-24 RX ADMIN — SODIUM CHLORIDE 50 ML/HR: 900 INJECTION, SOLUTION INTRAVENOUS at 08:15

## 2025-05-24 RX ADMIN — TICAGRELOR 90 MG: 90 TABLET ORAL at 08:15

## 2025-05-24 ASSESSMENT — ENCOUNTER SYMPTOMS
RECTAL PAIN: 0
CONSTIPATION: 0
DIZZINESS: 0
FEVER: 0
HALLUCINATIONS: 0
FATIGUE: 0
DYSURIA: 0
CONFUSION: 0
LIGHT-HEADEDNESS: 0
APNEA: 0
BLOOD IN STOOL: 0
WOUND: 0
NAUSEA: 0
JOINT SWELLING: 0
POLYPHAGIA: 0
TREMORS: 0
SLEEP DISTURBANCE: 0
BACK PAIN: 0
WHEEZING: 0
SHORTNESS OF BREATH: 1
NUMBNESS: 0
DIARRHEA: 0
COUGH: 0
VOMITING: 0
NECK PAIN: 0
HEMATURIA: 0
COLOR CHANGE: 0
CHILLS: 0
SORE THROAT: 0
BRUISES/BLEEDS EASILY: 0
ADENOPATHY: 0
ARTHRALGIAS: 0
SEIZURES: 0
PALPITATIONS: 0
WEAKNESS: 0
POLYDIPSIA: 0
ABDOMINAL PAIN: 0
SINUS PRESSURE: 0
HEADACHES: 0
FREQUENCY: 0
PHOTOPHOBIA: 0
MYALGIAS: 0
SPEECH DIFFICULTY: 0

## 2025-05-24 ASSESSMENT — PAIN SCALES - GENERAL: PAINLEVEL_OUTOF10: 0 - NO PAIN

## 2025-05-24 NOTE — DISCHARGE SUMMARY
Discharge Diagnosis  ST elevation myocardial infarction (STEMI), unspecified artery (Multi)    Issues Requiring Follow-Up  Follow up with Dr Bateman in 1 Week  Follow up with Dr. Blair in 2 Weeks following Basic Metabolic Panel  Cardiac Rehab    Test Results Pending At Discharge  Pending Labs       No current pending labs.            Hospital Course   Patient presented to the [facility] Emergency Department with complaints of chest pain. EKG in the emergency department revealing 1 mm ST elevation in the inferior leads, and a code STEMI was activated. The patient was taken emergently to the cardiac catheterization lab at which time catheterization revealed a critical 99% proximal LAD lesion. Successful percutaneous coronary intervention was performed with a [employer] [place] 3.00x 15RX drug-eluting stent to the proximal LAD. Echocardiogram completed during the hospitalization revealing a moderately decreased ejection fraction of 35-40%, abnormal wall motion with severe hypokinesis of the mid and distal anteroseptal wall and the entire LV apex. Initially, the patient was started on guideline directed medical therapy with Entresto and metoprolol succinate. She became hypotensive and creatinine did increase, at which time we discontinued the Entresto. Dr. Blair from nephrology services was consulted and cleared the patient for discharge with recommendations to follow up in his office in 2 weeks after completing a basic metabolic panel. She will resume her home dose of furosemide 20 mg twice daily at the recommendation of Dr. Blair, but we will continue to hold ACE/ARB or ARNI. The patient will continue on dual antiplatelet therapy with ticagrelor and aspirin for at least 1 year following PCI.  She has been started on a modified guideline directed therapy with metoprolol succinate 50 mg daily. Will continue atorvastatin 40 mg nightly. All new prescriptions were delivered bedside via the [facility] pharmacy prior to  discharge. She should follow-up with Dr. Bateman in the outpatient setting in 1 week following discharge.    Pertinent Physical Exam At Time of Discharge  Physical Exam  Vitals reviewed.   HENT:      Head: Normocephalic and atraumatic.   Cardiovascular:      Rate and Rhythm: Normal rate and regular rhythm.      Heart sounds: No murmur heard.     No friction rub. No gallop.   Pulmonary:      Effort: Pulmonary effort is normal.      Breath sounds: Normal breath sounds. No wheezing, rhonchi or rales.      Comments: No conversational dyspnea noted  Abdominal:      General: Bowel sounds are normal.      Palpations: Abdomen is soft.   Musculoskeletal:      Right lower leg: Edema present.      Left lower leg: Edema present.      Comments: Trace-1+ bilateral lower extremity edema   Skin:     General: Skin is warm and dry.      Capillary Refill: Capillary refill takes less than 2 seconds.   Neurological:      Mental Status: She is alert and oriented to person, place, and time.   Psychiatric:         Mood and Affect: Mood normal.         Behavior: Behavior normal.         Home Medications     Medication List      START taking these medications     metoprolol succinate XL 50 mg 24 hr tablet; Commonly known as:   Toprol-XL; Take 1 tablet (50 mg) by mouth once daily. Do not crush or   chew.; Start taking on: May 25, 2025   nitroglycerin 0.4 mg SL tablet; Commonly known as: Nitrostat; Place 1   tablet (0.4 mg) under the tongue every 5 minutes if needed for chest pain   for up to 1 dose or as directed by provider. If no relief, call 911 as   directed.   ticagrelor 90 mg tablet; Commonly known as: Brilinta; Take 1 tablet (90   mg) by mouth 2 times a day.     CONTINUE taking these medications     anastrozole 1 mg tablet; Commonly known as: Arimidex; Take 1 tablet (1   mg total) by mouth once daily.  Swallow whole with a drink of water.   aspirin 81 mg EC tablet   atorvastatin 40 mg tablet; Commonly known as: Lipitor; Take 1 tablet (40    mg) by mouth once daily.   cyanocobalamin 1,000 mcg tablet; Commonly known as: Vitamin B-12   famotidine 20 mg tablet; Commonly known as: Pepcid; Take 1 tablet (20   mg) by mouth 2 times a day.   furosemide 20 mg tablet; Commonly known as: Lasix; Take 1 tablet (20 mg)   by mouth 2 times daily (morning and late afternoon).   magnesium oxide 400 mg magnesium capsule   multivitamin tablet   NON FORMULARY   traZODone 100 mg tablet; Commonly known as: Desyrel; Take 1 tablet (100   mg) by mouth once daily at bedtime.       Outpatient Follow-Up  Future Appointments   Date Time Provider Department Center   6/10/2025 10:45 AM India Johnston MD PhD [id number]   11/12/2025  2:00 PM Marimar Hutson PA-C [id number]   11/12/2025  2:30 PM INF 01 [id number]       KATHERINE Chakraborty-CNP

## 2025-05-24 NOTE — PROGRESS NOTES
"Julia Carrion is a 65 y.o. female on day 2 of admission presenting with ST elevation myocardial infarction (STEMI), unspecified artery (Multi).    Subjective   Patient sitting up in chair this morning.        Objective     Physical Exam  Vitals reviewed.   Cardiovascular:      Rate and Rhythm: Normal rate and regular rhythm.      Heart sounds: No murmur heard.     No friction rub. No gallop.   Pulmonary:      Effort: Pulmonary effort is normal.      Breath sounds: Normal breath sounds. No wheezing, rhonchi or rales.   Abdominal:      General: Bowel sounds are normal.      Palpations: Abdomen is soft.   Musculoskeletal:      Right lower leg: No edema.      Left lower leg: No edema.   Skin:     General: Skin is warm and dry.      Capillary Refill: Capillary refill takes less than 2 seconds.   Neurological:      Mental Status: She is alert and oriented to person, place, and time.   Psychiatric:         Mood and Affect: Mood normal.         Behavior: Behavior normal.         Last Recorded Vitals  Blood pressure 99/58, pulse 72, temperature 36.6 °C (97.9 °F), temperature source Oral, resp. rate 16, height 1.651 m (5' 5\"), weight 76 kg (167 lb 8.8 oz), SpO2 94%.  Intake/Output last 3 Shifts:  I/O last 3 completed shifts:  In: 720 (9.5 mL/kg) [P.O.:720]  Out: - (0 mL/kg)   Weight: 76 kg     Relevant Results  Results for orders placed or performed during the hospital encounter of 05/22/25 (from the past 24 hours)   CBC and Auto Differential   Result Value Ref Range    WBC 10.8 4.4 - 11.3 x10*3/uL    nRBC 0.0 0.0 - 0.0 /100 WBCs    RBC 4.28 4.00 - 5.20 x10*6/uL    Hemoglobin 13.8 12.0 - 16.0 g/dL    Hematocrit 40.3 36.0 - 46.0 %    MCV 94 80 - 100 fL    MCH 32.2 26.0 - 34.0 pg    MCHC 34.2 32.0 - 36.0 g/dL    RDW 12.5 11.5 - 14.5 %    Platelets 186 150 - 450 x10*3/uL    Neutrophils % 69.3 40.0 - 80.0 %    Immature Granulocytes %, Automated 0.5 0.0 - 0.9 %    Lymphocytes % 18.2 13.0 - 44.0 %    Monocytes % 10.1 2.0 - 10.0 " %    Eosinophils % 1.5 0.0 - 6.0 %    Basophils % 0.4 0.0 - 2.0 %    Neutrophils Absolute 7.45 1.20 - 7.70 x10*3/uL    Immature Granulocytes Absolute, Automated 0.05 0.00 - 0.70 x10*3/uL    Lymphocytes Absolute 1.96 1.20 - 4.80 x10*3/uL    Monocytes Absolute 1.09 (H) 0.10 - 1.00 x10*3/uL    Eosinophils Absolute 0.16 0.00 - 0.70 x10*3/uL    Basophils Absolute 0.04 0.00 - 0.10 x10*3/uL   Comprehensive metabolic panel   Result Value Ref Range    Glucose 121 (H) 74 - 99 mg/dL    Sodium 136 136 - 145 mmol/L    Potassium 3.7 3.5 - 5.3 mmol/L    Chloride 103 98 - 107 mmol/L    Bicarbonate 26 21 - 32 mmol/L    Anion Gap 11 10 - 20 mmol/L    Urea Nitrogen 25 (H) 6 - 23 mg/dL    Creatinine 1.16 (H) 0.50 - 1.05 mg/dL    eGFR 52 (L) >60 mL/min/1.73m*2    Calcium 8.7 8.6 - 10.3 mg/dL    Albumin 4.0 3.4 - 5.0 g/dL    Alkaline Phosphatase 80 33 - 136 U/L    Total Protein 6.8 6.4 - 8.2 g/dL    AST 62 (H) 9 - 39 U/L    Bilirubin, Total 0.7 0.0 - 1.2 mg/dL    ALT 26 7 - 45 U/L             Assessment & Plan  ST elevation myocardial infarction (STEMI), unspecified artery (Multi)    5/22:  #1 acute ST elevation myocardial infarction status post PCI to proximal LAD with drug-eluting stent with excellent result.  Patient does have moderately reduced ejection fraction of 40% with apical anterior wall hypokinesis.  Recommend aspirin Brilinta high intensity statin.  We will obtain echocardiogram.  Discussed with patient lifestyle modification smoking cessation.     2.  Hypertension.  Will start patient on beta-blockers as well as ACE inhibitors for anterior STEMI with severe hypokinesis of anterior wall.  Will monitor.     3.  Hyperlipidemia recommend high intensity statin.     4.  Elevated liver function test likely secondary to acute myocardial infarction we will elevated troponin of 54,000 AST probably reflection of her myocardial infarction and delayed presentation of more than 12 hours.     5.  COPD.  Will consult ICU team for input and  optimization of her treatment.     6.  Smoking cessation discussed with patient.       5/23: The patient is sitting in bedside chair feeling well without any residual chest discomfort.  She denies shortness of breath.  Telemetry monitor demonstrates sinus rhythm primarily in the 80/min range.  Systolic blood pressures ranging from 115-130 mmHg.  EKG demonstrates poor R wave progression in the precordial leads with symmetric T wave inversions throughout the precordial leads and lateral limb leads consistent with an anterior apical myocardial infarction.  The patient's echocardiogram performed yesterday shows severe hypokinesis of the mid and distal anteroseptal wall and the entire LV apex with a reduced LV ejection fraction of 35-40%.  No clinical evidence for CHF.  Patient will be placed on low-dose Entresto 24/26 mg twice daily.  She will remain on aspirin and Brilinta dual antiplatelet therapy.  Lipid panel from earlier today includes a cholesterol 128 and will continue atorvastatin 40 mg daily for now pending further outpatient lipid panels.  Patient stable for stepdown unit transfer would observe for another 24 hours.  CBC is unremarkable with a WBC of 11,700.  The comprehensive metabolic panel is also essentially unremarkable other than for declining AST of 144 from her infarction.    5/24: Patient sitting up in chair. Denies further chest pain, pressure or palpitations. States she slept well last night. She was hypotensive overnight with last recorded blood pressure at 99/58.  She is breathing comfortably on room air with pulse oximetry of 94%.  Remains in sinus rhythm on telemetry without significant ectopy.  Her creatinine did rise from 0.84 yesterday to 1.16 today.  Likely contrast-induced in the setting of her recent cardiac catheterization.  I am going to give her a small fluid bolus of 500 cc of fluid over 4 hours.  Additionally at this point I am going to hold her Entresto as I think it is dropping her  blood pressures too much which could also be contributing to the acute kidney injury. Will continue to hold furosemide as well in the setting of acute kidney injury. I have placed holding parameters on the patient's metoprolol succinate. I have consulted nephrology services for their recommendations. Will possibly discharge this patient late afternoon today vs tomorrow pending nephrology recommendations.       Marcia Prince, APRN-CNP

## 2025-05-24 NOTE — CARE PLAN
The patient's goals for the shift include  be stable to discharge    The clinical goals for the shift include monitor telemetry and vitals.

## 2025-05-24 NOTE — NURSING NOTE
This RN sent message to Marcia Prince, with cardiology, to notify of patient's BP of  99/58 pulse 72. This RN requested parameters on metoprolol.   Parameters added. Metoprolol will be held.

## 2025-05-24 NOTE — CONSULTS
.Reason For Consult  Acute kidney injury    History Of Present Illness  Julia Carrion is a 65 y.o. female who had a history of hypertension, history of stroke in the past.  Very mild residual with left side weakness who was admitted to the hospital after she had an episode of chest pain and shortness of breath patient is known smoker recently the patient admitted that she started having swelling in both lower extremities her oncologist put her on a diuretic therapy she did undergo cardiac catheterization after the EKG showed 1 mm ST elevation in the inferior leads she had 99% proximal LAD stenosis and left ventriculogram showed apical severe wall hypokinesis she underwent PCI to the proximal LAD I was asked to see the patient in consultation because of mild acute kidney injury with elevated serum creatinine level patient feels well right now she denies any chest pain shortness of breath she was to go home     Review of Systems  Review of Systems   Constitutional:  Negative for chills, fatigue and fever.   HENT:  Negative for sinus pressure, sore throat and tinnitus.    Eyes:  Negative for photophobia and visual disturbance.   Respiratory:  Positive for shortness of breath. Negative for apnea, cough and wheezing.    Cardiovascular:  Positive for chest pain. Negative for palpitations and leg swelling.   Gastrointestinal:  Negative for abdominal pain, blood in stool, constipation, diarrhea, nausea, rectal pain and vomiting.   Endocrine: Negative for cold intolerance, heat intolerance, polydipsia, polyphagia and polyuria.   Genitourinary:  Negative for decreased urine volume, dysuria, frequency, hematuria and urgency.   Musculoskeletal:  Negative for arthralgias, back pain, joint swelling, myalgias and neck pain.   Skin:  Negative for color change, pallor, rash and wound.   Neurological:  Negative for dizziness, tremors, seizures, syncope, speech difficulty, weakness, light-headedness, numbness and headaches.    Hematological:  Negative for adenopathy. Does not bruise/bleed easily.   Psychiatric/Behavioral:  Negative for confusion, hallucinations, sleep disturbance and suicidal ideas.         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    Surgical History  She has a past surgical history that includes Other surgical history (01/04/2022); Other surgical history (01/04/2022); MR angio head wo IV contrast (10/26/2021); US guided biopsy lymph node superficial (3/28/2022); BI US guided breast localization and biopsy right (Right, 3/28/2022); BI stereotactic guided breast left localization and biopsy (Left, 3/28/2022); BI US guided breast localization right (Right, 9/26/2022); Cardiac catheterization (N/A, 5/22/2025); and Cardiac catheterization (N/A, 5/22/2025).     Social History  She reports that she has been smoking cigarettes. She has a 22.5 pack-year smoking history. She has been exposed to tobacco smoke. She has never used smokeless tobacco. She reports current alcohol use of about 21.0 standard drinks of alcohol per week. She reports that she does not use drugs.    Family History  Family History[1]   Current Medications[2]   Allergies  Patient has no known allergies.         Physical Exam  Physical Exam  Constitutional:       General: She is not in acute distress.     Appearance: She is not toxic-appearing.   HENT:      Head: Normocephalic and atraumatic.   Eyes:      Extraocular  "Movements: Extraocular movements intact.      Pupils: Pupils are equal, round, and reactive to light.   Neck:      Vascular: No carotid bruit.   Cardiovascular:      Rate and Rhythm: Normal rate and regular rhythm.   Pulmonary:      Effort: No respiratory distress.      Breath sounds: No stridor. No wheezing, rhonchi or rales.   Chest:      Chest wall: No tenderness.   Abdominal:      General: There is no distension.      Palpations: There is no mass.      Tenderness: There is no abdominal tenderness. There is no right CVA tenderness, left CVA tenderness or guarding.      Hernia: No hernia is present.   Musculoskeletal:         General: No swelling or tenderness.      Cervical back: No rigidity.      Right lower leg: No edema (Trace edema).      Left lower leg: No edema (This edema).   Lymphadenopathy:      Cervical: No cervical adenopathy.   Skin:     General: Skin is warm and dry.      Coloration: Skin is not jaundiced or pale.      Findings: No bruising or erythema.   Neurological:      General: No focal deficit present.      Mental Status: She is alert and oriented to person, place, and time.   Psychiatric:         Mood and Affect: Mood normal.         Behavior: Behavior normal.              I&O 24HR    Intake/Output Summary (Last 24 hours) at 5/24/2025 1141  Last data filed at 5/24/2025 0836  Gross per 24 hour   Intake 480 ml   Output --   Net 480 ml       Vitals 24HR  Heart Rate:  []   Temp:  [36.2 °C (97.1 °F)-37.2 °C (99 °F)]   Resp:  [14-20]   BP: ()/(57-79)   Height:  [165.1 cm (5' 5\")]   Weight:  [74.8 kg (165 lb)-76 kg (167 lb 8.8 oz)]   SpO2:  [93 %-96 %]     Relevant Results        Results for orders placed or performed during the hospital encounter of 05/22/25 (from the past 96 hours)   Electrocardiogram 12 Lead   Result Value Ref Range    Ventricular Rate 85 BPM    Atrial Rate 85 BPM    MO Interval 162 ms    QRS Duration 70 ms    QT Interval 366 ms    QTC Calculation(Bazett) 435 ms    P " Axis 54 degrees    R Axis 183 degrees    T Axis 19 degrees    QRS Count 14 beats    Q Onset 228 ms    P Onset 147 ms    P Offset 197 ms    T Offset 411 ms    QTC Fredericia 411 ms   aPTT   Result Value Ref Range    aPTT 34.6 (H) 22.0 - 32.5 seconds   Protime-INR   Result Value Ref Range    Protime 11.2 9.3 - 12.7 seconds    INR 1.0 0.9 - 1.2   CBC and Auto Differential   Result Value Ref Range    WBC 14.3 (H) 4.4 - 11.3 x10*3/uL    nRBC 0.0 0.0 - 0.0 /100 WBCs    RBC 4.74 4.00 - 5.20 x10*6/uL    Hemoglobin 15.4 12.0 - 16.0 g/dL    Hematocrit 45.1 36.0 - 46.0 %    MCV 95 80 - 100 fL    MCH 32.5 26.0 - 34.0 pg    MCHC 34.1 32.0 - 36.0 g/dL    RDW 12.5 11.5 - 14.5 %    Platelets 230 150 - 450 x10*3/uL    Neutrophils % 78.0 40.0 - 80.0 %    Immature Granulocytes %, Automated 0.4 0.0 - 0.9 %    Lymphocytes % 13.8 13.0 - 44.0 %    Monocytes % 7.2 2.0 - 10.0 %    Eosinophils % 0.3 0.0 - 6.0 %    Basophils % 0.3 0.0 - 2.0 %    Neutrophils Absolute 11.16 (H) 1.20 - 7.70 x10*3/uL    Immature Granulocytes Absolute, Automated 0.06 0.00 - 0.70 x10*3/uL    Lymphocytes Absolute 1.97 1.20 - 4.80 x10*3/uL    Monocytes Absolute 1.03 (H) 0.10 - 1.00 x10*3/uL    Eosinophils Absolute 0.04 0.00 - 0.70 x10*3/uL    Basophils Absolute 0.05 0.00 - 0.10 x10*3/uL   Comprehensive Metabolic Panel   Result Value Ref Range    Glucose 144 (H) 74 - 99 mg/dL    Sodium 136 136 - 145 mmol/L    Potassium 4.1 3.5 - 5.3 mmol/L    Chloride 99 98 - 107 mmol/L    Bicarbonate 25 21 - 32 mmol/L    Anion Gap 16 10 - 20 mmol/L    Urea Nitrogen 13 6 - 23 mg/dL    Creatinine 0.92 0.50 - 1.05 mg/dL    eGFR 69 >60 mL/min/1.73m*2    Calcium 9.6 8.6 - 10.3 mg/dL    Albumin 4.6 3.4 - 5.0 g/dL    Alkaline Phosphatase 95 33 - 136 U/L    Total Protein 8.0 6.4 - 8.2 g/dL     (H) 9 - 39 U/L    Bilirubin, Total 0.6 0.0 - 1.2 mg/dL    ALT 42 7 - 45 U/L   Troponin I, High Sensitivity, Initial   Result Value Ref Range    Troponin I, High Sensitivity 54,983 (HH) 0 - 13  ng/L   B-type natriuretic peptide   Result Value Ref Range     (H) 0 - 99 pg/mL   ACTIVATED CLOTTING TIME LOW   Result Value Ref Range    POCT Activated Clotting Time Low Range 287 (H) 89 - 169 sec   Transthoracic Echo Complete   Result Value Ref Range    AV pk cecile 1.47 m/s    LVOT diam 1.90 cm    AV mn grad 4 mmHg    MV E/A ratio 0.84     LV Biplane EF 49 %    LV EF 38 %    LVIDd 5.47 cm    AV pk grad 9 mmHg    Aortic Valve Area by Continuity of VTI 2.28 cm2    Aortic Valve Area by Continuity of Peak Velocity 2.29 cm2    LV A4C EF 52.7    Troponin, High Sensitivity, 1 Hour   Result Value Ref Range    Troponin I, High Sensitivity 49,951 (HH) 0 - 13 ng/L   Magnesium   Result Value Ref Range    Magnesium 2.14 1.60 - 2.40 mg/dL   Phosphorus   Result Value Ref Range    Phosphorus 2.9 2.5 - 4.9 mg/dL   CBC and Auto Differential   Result Value Ref Range    WBC 11.7 (H) 4.4 - 11.3 x10*3/uL    nRBC 0.0 0.0 - 0.0 /100 WBCs    RBC 4.33 4.00 - 5.20 x10*6/uL    Hemoglobin 14.0 12.0 - 16.0 g/dL    Hematocrit 41.4 36.0 - 46.0 %    MCV 96 80 - 100 fL    MCH 32.3 26.0 - 34.0 pg    MCHC 33.8 32.0 - 36.0 g/dL    RDW 12.5 11.5 - 14.5 %    Platelets 185 150 - 450 x10*3/uL    Neutrophils % 67.8 40.0 - 80.0 %    Immature Granulocytes %, Automated 0.3 0.0 - 0.9 %    Lymphocytes % 19.3 13.0 - 44.0 %    Monocytes % 10.8 2.0 - 10.0 %    Eosinophils % 1.4 0.0 - 6.0 %    Basophils % 0.4 0.0 - 2.0 %    Neutrophils Absolute 7.89 (H) 1.20 - 7.70 x10*3/uL    Immature Granulocytes Absolute, Automated 0.04 0.00 - 0.70 x10*3/uL    Lymphocytes Absolute 2.25 1.20 - 4.80 x10*3/uL    Monocytes Absolute 1.26 (H) 0.10 - 1.00 x10*3/uL    Eosinophils Absolute 0.16 0.00 - 0.70 x10*3/uL    Basophils Absolute 0.05 0.00 - 0.10 x10*3/uL   Comprehensive metabolic panel   Result Value Ref Range    Glucose 135 (H) 74 - 99 mg/dL    Sodium 136 136 - 145 mmol/L    Potassium 3.8 3.5 - 5.3 mmol/L    Chloride 104 98 - 107 mmol/L    Bicarbonate 27 21 - 32 mmol/L     Anion Gap 9 (L) 10 - 20 mmol/L    Urea Nitrogen 17 6 - 23 mg/dL    Creatinine 0.84 0.50 - 1.05 mg/dL    eGFR 77 >60 mL/min/1.73m*2    Calcium 9.1 8.6 - 10.3 mg/dL    Albumin 4.0 3.4 - 5.0 g/dL    Alkaline Phosphatase 85 33 - 136 U/L    Total Protein 7.0 6.4 - 8.2 g/dL     (H) 9 - 39 U/L    Bilirubin, Total 0.7 0.0 - 1.2 mg/dL    ALT 36 7 - 45 U/L   Magnesium   Result Value Ref Range    Magnesium 2.09 1.60 - 2.40 mg/dL   Phosphorus   Result Value Ref Range    Phosphorus 3.2 2.5 - 4.9 mg/dL   Lipid panel   Result Value Ref Range    Cholesterol 122 0 - 199 mg/dL    HDL-Cholesterol 32.7 mg/dL    Cholesterol/HDL Ratio 3.7     LDL Calculated 48 <=99 mg/dL    VLDL 41 (H) 0 - 40 mg/dL    Triglycerides 205 (H) 0 - 149 mg/dL    Non HDL Cholesterol 89 0 - 149 mg/dL   Calcium, Ionized   Result Value Ref Range    POCT Calcium, Ionized 1.14 1.1 - 1.33 mmol/L   Hemoglobin A1c   Result Value Ref Range    Hemoglobin A1C 5.8 (H) See comment %    Estimated Average Glucose 120 Not Established mg/dL   ECG 12 Lead   Result Value Ref Range    Ventricular Rate 70 BPM    Atrial Rate 70 BPM    MO Interval 156 ms    QRS Duration 82 ms    QT Interval 450 ms    QTC Calculation(Bazett) 486 ms    P Axis 61 degrees    R Axis -35 degrees    T Axis 125 degrees    QRS Count 12 beats    Q Onset 221 ms    P Onset 143 ms    P Offset 196 ms    T Offset 446 ms    QTC Fredericia 474 ms   CBC and Auto Differential   Result Value Ref Range    WBC 10.8 4.4 - 11.3 x10*3/uL    nRBC 0.0 0.0 - 0.0 /100 WBCs    RBC 4.28 4.00 - 5.20 x10*6/uL    Hemoglobin 13.8 12.0 - 16.0 g/dL    Hematocrit 40.3 36.0 - 46.0 %    MCV 94 80 - 100 fL    MCH 32.2 26.0 - 34.0 pg    MCHC 34.2 32.0 - 36.0 g/dL    RDW 12.5 11.5 - 14.5 %    Platelets 186 150 - 450 x10*3/uL    Neutrophils % 69.3 40.0 - 80.0 %    Immature Granulocytes %, Automated 0.5 0.0 - 0.9 %    Lymphocytes % 18.2 13.0 - 44.0 %    Monocytes % 10.1 2.0 - 10.0 %    Eosinophils % 1.5 0.0 - 6.0 %    Basophils % 0.4 0.0  - 2.0 %    Neutrophils Absolute 7.45 1.20 - 7.70 x10*3/uL    Immature Granulocytes Absolute, Automated 0.05 0.00 - 0.70 x10*3/uL    Lymphocytes Absolute 1.96 1.20 - 4.80 x10*3/uL    Monocytes Absolute 1.09 (H) 0.10 - 1.00 x10*3/uL    Eosinophils Absolute 0.16 0.00 - 0.70 x10*3/uL    Basophils Absolute 0.04 0.00 - 0.10 x10*3/uL   Comprehensive metabolic panel   Result Value Ref Range    Glucose 121 (H) 74 - 99 mg/dL    Sodium 136 136 - 145 mmol/L    Potassium 3.7 3.5 - 5.3 mmol/L    Chloride 103 98 - 107 mmol/L    Bicarbonate 26 21 - 32 mmol/L    Anion Gap 11 10 - 20 mmol/L    Urea Nitrogen 25 (H) 6 - 23 mg/dL    Creatinine 1.16 (H) 0.50 - 1.05 mg/dL    eGFR 52 (L) >60 mL/min/1.73m*2    Calcium 8.7 8.6 - 10.3 mg/dL    Albumin 4.0 3.4 - 5.0 g/dL    Alkaline Phosphatase 80 33 - 136 U/L    Total Protein 6.8 6.4 - 8.2 g/dL    AST 62 (H) 9 - 39 U/L    Bilirubin, Total 0.7 0.0 - 1.2 mg/dL    ALT 26 7 - 45 U/L          Assessment/Plan     Imaging  No results found.    Cardiology, Vascular, and Other Imaging  ECG 12 Lead  Result Date: 5/23/2025  Normal sinus rhythm Left axis deviation Low voltage QRS Anterolateral infarct , age undetermined QTcB >= 480 msec Abnormal ECG No previous ECGs available    Transthoracic Echo Complete  Result Date: 5/22/2025           [place] [address]            Phone [phone] TRANSTHORACIC ECHOCARDIOGRAM REPORT Patient Name:       EMELY Sanches Physician:    [id number] Clay Nickerson MD Study Date:         5/22/2025            Ordering Provider:    [id number] MALENA DIXON MRN/PID:            [id number]             Fellow: Accession#:         [id number]         Nurse: Date of Birth/Age:  1960 / 65 years Sonographer:          Campbell Ann                                                                [facility]  Gender Assigned at  F                    Additional Staff: Birth: Height:             152.40 cm            Admit Date: Weight:             77.11 kg             Admission Status:     Inpatient -                                                                Routine BSA / BMI:          1.74 m2 / 33.20      Department Location:  [facility]                     kg/m2 Blood Pressure: 118 /75 mmHg Study Type:    TRANSTHORACIC ECHO (TTE) COMPLETE Diagnosis/ICD: ST elevation (STEMI) myocardial infarction of unspecified                site-I21.3 Indication:    STEMI CPT Codes:     Echo Complete w Full Doppler-[id number] Patient History: Smoker:            Current. Pertinent History: CAD, Chest Pain, CHF, COPD, Dyspnea, Syncope, HTN,                    Hyperlipidemia, LE Edema and Murmur. GERD, STEMI, PCI. Study Detail: The following Echo studies were performed: 2D, M-Mode, Doppler and               color flow.  PHYSICIAN INTERPRETATION: Left Ventricle: The left ventricular systolic function is moderately decreased, with a visually estimated ejection fraction of 35-40%. There is severe concentric left ventricular hypertrophy. Wall motion is abnormal. The left ventricular cavity size is normal. There is normal septal and mildly increased posterior left ventricular wall thickness. Spectral Doppler shows an abnormal pattern of left ventricular diastolic filling. There is severe hypokinesis of the mid and distal anteroseptal wall and the entire LV apex. Left Atrium: The left atrial size is normal. Right Ventricle: The right ventricle is normal in size. There is normal right ventriclar wall thickness. There is normal right ventricular global systolic function. Right Atrium: The right atrial size is normal. Aortic Valve: The aortic valve is trileaflet. There is no aortic valve cusp calcification noted. There is no evidence of reduced aortic valve leaflet excursion excursion. The aortic valve dimensionless index is 0.80. There is no  evidence of aortic valve regurgitation. The peak instantaneous gradient of the aortic valve is 9 mmHg. The mean gradient of the aortic valve is 4 mmHg. Mitral Valve: The mitral valve is normal in structure. There is normal mitral valve leaflet mobility. There is trace mitral valve regurgitation. Tricuspid Valve: The tricuspid valve is structurally normal. There is normal tricuspid valve leaflet mobility. There is trace tricuspid regurgitation. Pulmonic Valve: The pulmonic valve is structurally normal. There is no indication of pulmonic valve regurgitation. Pericardium: No pericardial effusion noted. Aorta: The aortic root is normal. There is no dilatation of the aortic arch. There is no dilatation of the ascending aorta. There is no dilatation of the aortic root. Pulmonary Artery: The pulmonary artery is normal in size. The estimated pulmonary artery pressure is normal. Systemic Veins: The inferior vena cava appears normal in size, with IVC inspiratory collapse greater than 50%.  CONCLUSIONS:  1. The left ventricular systolic function is moderately decreased, with a visually estimated ejection fraction of 35-40%.  2. Abnormal wall motion.  3. Spectral Doppler shows an abnormal pattern of left ventricular diastolic filling.  4. There is severe hypokinesis of the mid and distal anteroseptal wall and the entire LV apex. QUANTITATIVE DATA SUMMARY:  2D MEASUREMENTS:             Normal Ranges: Ao Root s:       2.84 cm IVSd:            0.92 cm     (0.6-1.1cm) LVPWd:           1.16 cm     (0.6-1.1cm) LVIDd:           5.47 cm     (3.9-5.9cm) LVIDs:           4.07 cm LV Mass Index:   128.2 g/m2 LVEDV Index:     56.93 ml/m2 LV % FS          25.6 %  LEFT ATRIUM:                 Normal Ranges: LA Vol A4C:       39.5 ml LA Vol A2C:       36.1 ml LA Vol Index BSA: 21.7 ml/m2  RIGHT ATRIUM:          Normal Ranges: RA Area A4C:  13.0 cm2  AORTA MEASUREMENTS:         Normal Ranges: Ao Sinus, d:        2.80 cm (2.1-3.5cm)  LV SYSTOLIC  FUNCTION:                      Normal Ranges: EF-A4C View:    53 % (>=55%) EF-A2C View:    42 % EF-Biplane:     49 % EF-Visual:      38 % EF-Auto:        51 % LV EF Reported: 38 %  LV DIASTOLIC FUNCTION:           Normal Ranges: MV Peak E:             0.76 m/s  (0.7-1.2 m/s) MV Peak A:             0.90 m/s  (0.42-0.7 m/s) E/A Ratio:             0.84      (1.0-2.2) MV e'                  0.067 m/s (>8.0) MV lateral e'          0.07 m/s MV medial e'           0.06 m/s E/e' Ratio:            11.34     (<8.0)  MITRAL VALVE:          Normal Ranges: MV DT:        166 msec (150-240msec)  AORTIC VALVE:                     Normal Ranges: AoV Vmax:                1.47 m/s (<=1.7m/s) AoV Peak P.6 mmHg (<20mmHg) AoV Mean P.1 mmHg (1.7-11.5mmHg) LVOT Max Cirilo:            1.18 m/s (<=1.1m/s) AoV VTI:                 28.25 cm (18-25cm) LVOT VTI:                22.67 cm LVOT Diameter:           1.90 cm  (1.8-2.4cm) AoV Area, VTI:           2.28 cm2 (2.5-5.5cm2) AoV Area,Vmax:           2.29 cm2 (2.5-4.5cm2) AoV Dimensionless Index: 0.80  RIGHT VENTRICLE: RV Basal 2.20 cm RV Mid   1.50 cm RV Major 6.6 cm  TRICUSPID VALVE/RVSP:         Normal Ranges: IVC Diam:             1.68 cm  PULMONIC VALVE:          Normal Ranges: PV Max Cirilo:     0.9 m/s  (0.6-0.9m/s) PV Max PG:      3.6 mmHg  [id number] Clay Nickerson MD Electronically signed on 2025 at 4:48:40 PM  ** Final **     Assessment:  Acute kidney injury secondary to IV contrast  Acute myocardial infarction  Coronary artery disease status post cardiac cath and PCI  Hypertension  Lipidemia  COPD  Breast cancer status post bilateral mastectomy    Plan:  I agree with holding Entresto at this time  Okay to restart her diuretics  Okay to discharge today however I need basic metabolic panel next week and patient to follow-up with me in the office in a week or 2  Avoid ACE inhibitors and ARB's at this time    Thank you for your consultation    Vinecnzo Blair,  MDInpatient consult to Nephrology  Consult performed by: Vincenzo Blair MD  Consult ordered by: DIONNE Chakraborty             [1]   Family History  Problem Relation Name Age of Onset    Cancer Sister La    [2]   Current Facility-Administered Medications:     acetaminophen (Tylenol) tablet 650 mg, 650 mg, oral, q6h PRN, 650 mg at 05/23/25 2001 **OR** acetaminophen (Tylenol) oral liquid 650 mg, 650 mg, nasogastric tube, q6h PRN **OR** acetaminophen (Tylenol) suppository 650 mg, 650 mg, rectal, q6h PRN, DIONNE Trujillo    anastrozole (Arimidex) tablet 1 mg, 1 mg, oral, Daily, DIONNE Trujillo, 1 mg at 05/24/25 0815    aspirin EC tablet 81 mg, 81 mg, oral, Daily, DIONNE Trujillo, 81 mg at 05/24/25 0815    atorvastatin (Lipitor) tablet 40 mg, 40 mg, oral, Nightly, DIONNE Trujillo, 40 mg at 05/23/25 2001    [Held by provider] furosemide (Lasix) tablet 20 mg, 20 mg, oral, BID, Adama Jones PA-C    metoprolol succinate XL (Toprol-XL) 24 hr tablet 50 mg, 50 mg, oral, Daily, DIONNE Chakraborty, 50 mg at 05/23/25 0920    nitroglycerin (Nitrostat) SL tablet 0.4 mg, 0.4 mg, sublingual, q5 min PRN, DIONNE Trujillo, 0.4 mg at 05/22/25 1034    oxygen (O2) therapy, , inhalation, Continuous PRN - O2/gases, Morris Crowley MD    [Held by provider] sacubitriL-valsartan (Entresto) 24-26 mg per tablet 1 tablet, 1 tablet, oral, BID, DIONNE Trujillo, 1 tablet at 05/23/25 2001    sodium chloride 0.9% infusion, 100 mL/hr, intravenous, Continuous, DIONNE Chakraborty, Last Rate: 100 mL/hr at 05/24/25 1128, 100 mL/hr at 05/24/25 1128    ticagrelor (Brilinta) tablet 90 mg, 90 mg, oral, BID, DIONNE Trujillo, 90 mg at 05/24/25 0815    traZODone (Desyrel) tablet 100 mg, 100 mg, oral, Nightly PRN, DIONNE Trujillo, 100 mg at 05/23/25 2001

## 2025-05-25 LAB
ATRIAL RATE: 70 BPM
ATRIAL RATE: 85 BPM
P AXIS: 54 DEGREES
P AXIS: 61 DEGREES
P OFFSET: 196 MS
P OFFSET: 197 MS
P ONSET: 143 MS
P ONSET: 147 MS
PR INTERVAL: 156 MS
PR INTERVAL: 162 MS
Q ONSET: 221 MS
Q ONSET: 228 MS
QRS COUNT: 12 BEATS
QRS COUNT: 14 BEATS
QRS DURATION: 70 MS
QRS DURATION: 82 MS
QT INTERVAL: 366 MS
QT INTERVAL: 450 MS
QTC CALCULATION(BAZETT): 435 MS
QTC CALCULATION(BAZETT): 486 MS
QTC FREDERICIA: 411 MS
QTC FREDERICIA: 474 MS
R AXIS: -35 DEGREES
R AXIS: 183 DEGREES
T AXIS: 125 DEGREES
T AXIS: 19 DEGREES
T OFFSET: 411 MS
T OFFSET: 446 MS
VENTRICULAR RATE: 70 BPM
VENTRICULAR RATE: 85 BPM

## 2025-05-27 ENCOUNTER — PATIENT OUTREACH (OUTPATIENT)
Dept: PRIMARY CARE | Facility: CLINIC | Age: 65
End: 2025-05-27
Payer: MEDICARE

## 2025-05-27 LAB
ATRIAL RATE: 70 BPM
P AXIS: 63 DEGREES
P OFFSET: 191 MS
P ONSET: 137 MS
PR INTERVAL: 166 MS
Q ONSET: 220 MS
QRS COUNT: 11 BEATS
QRS DURATION: 80 MS
QT INTERVAL: 478 MS
QTC CALCULATION(BAZETT): 516 MS
QTC FREDERICIA: 503 MS
R AXIS: -21 DEGREES
T AXIS: 126 DEGREES
T OFFSET: 459 MS
VENTRICULAR RATE: 70 BPM

## 2025-05-27 NOTE — PROGRESS NOTES
Discharge Facility: [facility]   Discharge Diagnosis: ST elevation myocardial infarction (STEMI), unspecified artery; Acute kidney injury   Admission Date: 5/22/2025  Discharge Date:  5/24/2025    PCP Appointment Date: 6/10/2025 outside rec 14 day window to be seen, Office aware to schedule earlier appt if possible  Specialist Appointment Date: TBD   Schedule an appointment with Maribel Bateman MD (Cardiology) in 1 week (6/1/2025)    Schedule an appointment with Vincenzo Blair MD (Nephrology) in 2 weeks (6/8/2025)   Hospital Encounter and Summary Linked: Yes  ED to Hosp-Admission (Discharged) with Maribel Bateman MD (05/22/2025)   See discharge assessment below for further details  Wrap Up  Wrap Up Additional Comments: Successful outreach to the patient has been completed. The patient reports feeling well at home since discharge and denies experiencing any chest pain, shortness of breath. Patient states she is having diarrhea though for the last 3 days and lack of appetite. She states she has been drinking plenty of water but struggles to consume food at this time. We reviewed their new medications and any changes made. The patient confirmed that they have all the necessary supplies and resources at home, and they also have support from family and friends if needed. I emphasized the importance of follow-up appointments with both their primary care physician and specialists to assess treatment response. The patient is aware of my availability for non-emergency concerns and has been provided with my contact information. (5/27/2025 12:29 PM)    Engagement  Call Start Time: 1214 (5/27/2025 12:29 PM)    Medications  Medications reviewed with patient/caregiver?: Yes (new meds discussed with patient) (5/27/2025 12:29 PM)  Is the patient having any side effects they believe may be caused by any medication additions or changes?: No (5/27/2025 12:29 PM)  Does the patient have all medications ordered at  discharge?: Yes (5/27/2025 12:29 PM)  Care Management Interventions: No intervention needed (5/27/2025 12:29 PM)  Prescription Comments: see med list (metoprolol; nitroglycerin; brilinta) (5/27/2025 12:29 PM)  Is the patient taking all medications as directed (includes completed medication regime)?: Yes (5/27/2025 12:29 PM)  Care Management Interventions: Provided patient education; Notified pharmacy for assistance (5/27/2025 12:29 PM)    Appointments  Does the patient have a primary care provider?: Yes (5/27/2025 12:29 PM)  Care Management Interventions: Verified appointment date/time/provider (5/27/2025 12:29 PM)  Has the patient kept scheduled appointments due by today?: Yes (5/27/2025 12:29 PM)  Care Management Interventions: Advised to schedule with specialist (5/27/2025 12:29 PM)    Self Management  What is the home health agency?: denies need (5/27/2025 12:29 PM)  What Durable Medical Equipment (DME) was ordered?: n/a (5/27/2025 12:29 PM)    Patient Teaching  Does the patient have access to their discharge instructions?: Yes (5/27/2025 12:29 PM)  Care Management Interventions: Reviewed instructions with patient (5/27/2025 12:29 PM)  What is the patient's perception of their health status since discharge?: Improving (5/27/2025 12:29 PM)  Is the patient/caregiver able to teach back the hierarchy of who to call/visit for symptoms/problems? PCP, Specialist, Home Health nurse, Urgent Care, ED, 911: Yes (5/27/2025 12:29 PM)

## 2025-05-30 DIAGNOSIS — Z95.5 STENTED CORONARY ARTERY: Primary | ICD-10-CM

## 2025-05-31 DIAGNOSIS — N17.9 ACUTE KIDNEY INJURY: ICD-10-CM

## 2025-06-01 ENCOUNTER — APPOINTMENT (OUTPATIENT)
Dept: CARDIOLOGY | Facility: HOSPITAL | Age: 65
End: 2025-06-01
Payer: MEDICARE

## 2025-06-01 ENCOUNTER — HOSPITAL ENCOUNTER (EMERGENCY)
Facility: HOSPITAL | Age: 65
Discharge: HOME | End: 2025-06-01
Attending: STUDENT IN AN ORGANIZED HEALTH CARE EDUCATION/TRAINING PROGRAM
Payer: MEDICARE

## 2025-06-01 VITALS
DIASTOLIC BLOOD PRESSURE: 65 MMHG | TEMPERATURE: 98.4 F | HEIGHT: 64 IN | OXYGEN SATURATION: 97 % | WEIGHT: 167 LBS | HEART RATE: 72 BPM | RESPIRATION RATE: 18 BRPM | SYSTOLIC BLOOD PRESSURE: 118 MMHG | BODY MASS INDEX: 28.51 KG/M2

## 2025-06-01 DIAGNOSIS — G89.29 CHRONIC LEFT-SIDED LOW BACK PAIN WITH LEFT-SIDED SCIATICA: Primary | ICD-10-CM

## 2025-06-01 DIAGNOSIS — M54.42 CHRONIC LEFT-SIDED LOW BACK PAIN WITH LEFT-SIDED SCIATICA: Primary | ICD-10-CM

## 2025-06-01 PROCEDURE — 2500000004 HC RX 250 GENERAL PHARMACY W/ HCPCS (ALT 636 FOR OP/ED): Performed by: STUDENT IN AN ORGANIZED HEALTH CARE EDUCATION/TRAINING PROGRAM

## 2025-06-01 PROCEDURE — 99284 EMERGENCY DEPT VISIT MOD MDM: CPT | Performed by: STUDENT IN AN ORGANIZED HEALTH CARE EDUCATION/TRAINING PROGRAM

## 2025-06-01 PROCEDURE — 93005 ELECTROCARDIOGRAM TRACING: CPT

## 2025-06-01 PROCEDURE — 2500000001 HC RX 250 WO HCPCS SELF ADMINISTERED DRUGS (ALT 637 FOR MEDICARE OP): Performed by: STUDENT IN AN ORGANIZED HEALTH CARE EDUCATION/TRAINING PROGRAM

## 2025-06-01 PROCEDURE — 96372 THER/PROPH/DIAG INJ SC/IM: CPT | Performed by: STUDENT IN AN ORGANIZED HEALTH CARE EDUCATION/TRAINING PROGRAM

## 2025-06-01 RX ORDER — IBUPROFEN 400 MG/1
400 TABLET, FILM COATED ORAL EVERY 6 HOURS
Qty: 28 TABLET | Refills: 0 | Status: SHIPPED | OUTPATIENT
Start: 2025-06-01 | End: 2025-06-01

## 2025-06-01 RX ORDER — OXYCODONE AND ACETAMINOPHEN 5; 325 MG/1; MG/1
1 TABLET ORAL EVERY 6 HOURS PRN
Qty: 5 TABLET | Refills: 0 | Status: SHIPPED | OUTPATIENT
Start: 2025-06-01 | End: 2025-06-01

## 2025-06-01 RX ORDER — ACETAMINOPHEN 500 MG
1000 TABLET ORAL EVERY 6 HOURS PRN
Qty: 30 TABLET | Refills: 0 | Status: SHIPPED | OUTPATIENT
Start: 2025-06-01 | End: 2025-06-01

## 2025-06-01 RX ORDER — ACETAMINOPHEN 325 MG/1
975 TABLET ORAL ONCE
Status: COMPLETED | OUTPATIENT
Start: 2025-06-01 | End: 2025-06-01

## 2025-06-01 RX ORDER — ACETAMINOPHEN 500 MG
1000 TABLET ORAL EVERY 6 HOURS PRN
Qty: 30 TABLET | Refills: 0 | Status: SHIPPED | OUTPATIENT
Start: 2025-06-01 | End: 2025-06-11

## 2025-06-01 RX ORDER — IBUPROFEN 400 MG/1
400 TABLET, FILM COATED ORAL EVERY 6 HOURS
Qty: 28 TABLET | Refills: 0 | Status: SHIPPED | OUTPATIENT
Start: 2025-06-01 | End: 2025-06-08

## 2025-06-01 RX ORDER — KETOROLAC TROMETHAMINE 30 MG/ML
30 INJECTION, SOLUTION INTRAMUSCULAR; INTRAVENOUS ONCE
Status: COMPLETED | OUTPATIENT
Start: 2025-06-01 | End: 2025-06-01

## 2025-06-01 RX ORDER — OXYCODONE AND ACETAMINOPHEN 5; 325 MG/1; MG/1
1 TABLET ORAL ONCE
Refills: 0 | Status: COMPLETED | OUTPATIENT
Start: 2025-06-01 | End: 2025-06-01

## 2025-06-01 RX ORDER — OXYCODONE AND ACETAMINOPHEN 5; 325 MG/1; MG/1
1 TABLET ORAL EVERY 6 HOURS PRN
Qty: 5 TABLET | Refills: 0 | Status: SHIPPED | OUTPATIENT
Start: 2025-06-01 | End: 2025-06-04

## 2025-06-01 RX ADMIN — KETOROLAC TROMETHAMINE 30 MG: 30 INJECTION, SOLUTION INTRAMUSCULAR at 09:09

## 2025-06-01 RX ADMIN — OXYCODONE HYDROCHLORIDE AND ACETAMINOPHEN 1 TABLET: 5; 325 TABLET ORAL at 10:40

## 2025-06-01 RX ADMIN — DEXAMETHASONE 10 MG: 6 TABLET ORAL at 10:39

## 2025-06-01 RX ADMIN — ACETAMINOPHEN 975 MG: 325 TABLET ORAL at 09:09

## 2025-06-01 ASSESSMENT — LIFESTYLE VARIABLES
EVER FELT BAD OR GUILTY ABOUT YOUR DRINKING: NO
TOTAL SCORE: 0
HAVE PEOPLE ANNOYED YOU BY CRITICIZING YOUR DRINKING: NO
EVER HAD A DRINK FIRST THING IN THE MORNING TO STEADY YOUR NERVES TO GET RID OF A HANGOVER: NO
HAVE YOU EVER FELT YOU SHOULD CUT DOWN ON YOUR DRINKING: NO

## 2025-06-01 ASSESSMENT — PAIN - FUNCTIONAL ASSESSMENT: PAIN_FUNCTIONAL_ASSESSMENT: 0-10

## 2025-06-01 ASSESSMENT — PAIN SCALES - GENERAL
PAINLEVEL_OUTOF10: 7
PAINLEVEL_OUTOF10: 7

## 2025-06-01 NOTE — DISCHARGE INSTRUCTIONS
Take Tylenol/Motrin at home per the computer.  If you have a lot of pain take oxycodone.  Please follow with your doctor as soon as possible.  Please come back if you develop weakness, worsening numbness, saddle anesthesia, unable to urinate, unable to pass bowel movements, abdominal pain, weakness, or any other symptoms.

## 2025-06-01 NOTE — ED PROVIDER NOTES
Emergency Department Provider Note       History of Present Illness     History provided by: Patient  Limitations to History: None  External Records Reviewed with Brief Summary: EMR reviewed     HPI:  Julia Carrion is a 65 y.o. female with hx of sciatica, chronic low back pain, CVA, breast canceer,  recent MI with stents presents due to left lower back pain for few days. Sharp pain radiating to the left lower extremity with associated numbness.   Pt states she has done physical therapy for this before but was never officially diagnosed with sciatica.  Denies chest pain, denies shortness of breath, weakness, urinary retention, urinary incontinence, constipation, saddle anesthesia, fever, chills.     Physical Exam   Triage vitals:  T 36.9 °C (98.4 °F)  HR 72  /65  RR 18  O2 97 %      Physical Exam  Vitals and nursing note reviewed.   Constitutional:       Appearance: Normal appearance.   HENT:      Head: Normocephalic and atraumatic.      Nose: Nose normal.      Mouth/Throat:      Mouth: Mucous membranes are moist.   Cardiovascular:      Rate and Rhythm: Normal rate and regular rhythm.      Heart sounds: Normal heart sounds.   Pulmonary:      Effort: Pulmonary effort is normal.      Breath sounds: Normal breath sounds.   Abdominal:      General: Abdomen is flat.      Tenderness: There is no abdominal tenderness.   Musculoskeletal:         General: Normal range of motion.      Cervical back: Normal range of motion and neck supple.      Right hip: Normal.      Left hip: Normal.      Right upper leg: Normal.      Left upper leg: Normal.      Right knee: Normal.      Left knee: Normal.      Right lower leg: Normal.      Left lower leg: Normal.      Right ankle: Normal.      Left ankle: Normal.      Right foot: Normal.      Left foot: Normal.      Comments: 5/5 strengthen in all extremities   Decreased sensation in the lateral left calf  Able to hip extend and flex  Able to flex and extend knee  Ankle  flexion and extension normal  Able to moves toes w/o difficulties  2 +DP   Skin:     General: Skin is warm.   Neurological:      Mental Status: She is alert.   Psychiatric:         Mood and Affect: Mood normal.           Medical Decision Making & ED Course   Medical Decision Makin y.o. female with hx of sciatica, chronic low back pain, CVA, breast canceer,  recent MI with stents presents due to left lower back pain for few days. Sharp pain radiating to the left lower extremity with associated numbness.   Pt states she has done physical therapy for this before but was never officially diagnosed with sciatica.  Denies chest pain, denies shortness of breath, weakness, urinary retention, urinary incontinence, constipation, saddle anesthesia, fever, chills.   5/5 strengthen in all extremities   Decreased sensation in the lateral left calf  Able to hip extend and flex  Able to flex and extend knee  Ankle flexion and extension normal  Able to moves toes w/o difficulties  2 +DP  No spine compression syndrome   Most likely sciatica  No abdominal pain, no UTI symptoms.   Plan: pain control, reassess   ----      Differential diagnoses considered include but are not limited to: see above     Social Determinants of Health which Significantly Impact Care: Social Determinants of Health which Significantly Impact Care: None identified     EKG Independent Interpretation: see below     Independent Result Review and Interpretation: None obtained    Chronic conditions affecting the patient's care: As documented above in [facility]    The patient was discussed with the following consultants/services: None    Care Considerations: As documented above in [facility]    ED Course:  ED Course as of 25 1747   Sun 2025   0840   ECG interpreted by me: Normal sinus rhythm, no ST elevations or depressions.  Normal QTc. [CC]   1747 After pain control patient was ambulatory, not in distress  Well appearing  Dc with pain control and pcp follow  up.  [CC]      ED Course User Index  [CC] Janet Enciso MD         Diagnoses as of 06/01/25 1747   Chronic left-sided low back pain with left-sided sciatica       Disposition   As a result of the work-up, the patient was discharged home.  she was informed of her diagnosis and instructed to come back with any concerns or worsening of condition.  she and was agreeable to the plan as discussed above.  she was given the opportunity to ask questions.  All of the patient's questions were answered.    Procedures   Procedures    Patient was seen independently    Janet Enciso MD  Emergency Medicine                                                            Janet Enciso MD  06/01/25 1745

## 2025-06-01 NOTE — ED TRIAGE NOTES
Pt states she started having left lower back pain that radiates to the left thigh and causing numbness. Pt states she has had a recent MI with stent placement, hx breast cancer (in remission), had a stroke. Pt states she has done physical therapy for this before but was never officially diagnosed with sciatica. Denies chest pain, denies shortness of breath

## 2025-06-03 ENCOUNTER — OFFICE VISIT (OUTPATIENT)
Dept: PRIMARY CARE | Facility: CLINIC | Age: 65
End: 2025-06-03
Payer: MEDICARE

## 2025-06-03 VITALS
BODY MASS INDEX: 28.51 KG/M2 | TEMPERATURE: 97.6 F | OXYGEN SATURATION: 96 % | HEART RATE: 65 BPM | SYSTOLIC BLOOD PRESSURE: 112 MMHG | WEIGHT: 167 LBS | DIASTOLIC BLOOD PRESSURE: 62 MMHG | HEIGHT: 64 IN

## 2025-06-03 DIAGNOSIS — M54.42 CHRONIC LEFT-SIDED LOW BACK PAIN WITH LEFT-SIDED SCIATICA: ICD-10-CM

## 2025-06-03 DIAGNOSIS — Z09 HOSPITAL DISCHARGE FOLLOW-UP: Primary | ICD-10-CM

## 2025-06-03 DIAGNOSIS — I50.21 ACUTE SYSTOLIC HEART FAILURE: ICD-10-CM

## 2025-06-03 DIAGNOSIS — N17.9 ACUTE KIDNEY INJURY: ICD-10-CM

## 2025-06-03 DIAGNOSIS — I25.2 HISTORY OF MI (MYOCARDIAL INFARCTION): ICD-10-CM

## 2025-06-03 DIAGNOSIS — R60.0 BILATERAL LEG EDEMA: ICD-10-CM

## 2025-06-03 DIAGNOSIS — G89.29 CHRONIC LEFT-SIDED LOW BACK PAIN WITH LEFT-SIDED SCIATICA: ICD-10-CM

## 2025-06-03 DIAGNOSIS — R73.03 PREDIABETES: ICD-10-CM

## 2025-06-03 PROBLEM — I21.02: Status: ACTIVE | Noted: 2025-06-03

## 2025-06-03 LAB
ANION GAP SERPL CALCULATED.4IONS-SCNC: 10 MMOL/L (CALC) (ref 7–17)
BUN SERPL-MCNC: 20 MG/DL (ref 7–25)
BUN/CREAT SERPL: ABNORMAL (CALC) (ref 6–22)
CALCIUM SERPL-MCNC: 9.7 MG/DL (ref 8.6–10.4)
CHLORIDE SERPL-SCNC: 98 MMOL/L (ref 98–110)
CO2 SERPL-SCNC: 32 MMOL/L (ref 20–32)
CREAT SERPL-MCNC: 0.93 MG/DL (ref 0.5–1.05)
EGFRCR SERPLBLD CKD-EPI 2021: 68 ML/MIN/1.73M2
GLUCOSE SERPL-MCNC: 117 MG/DL (ref 65–99)
POTASSIUM SERPL-SCNC: 3.9 MMOL/L (ref 3.5–5.3)
SODIUM SERPL-SCNC: 140 MMOL/L (ref 135–146)

## 2025-06-03 PROCEDURE — 1160F RVW MEDS BY RX/DR IN RCRD: CPT | Performed by: PHYSICIAN ASSISTANT

## 2025-06-03 PROCEDURE — 99495 TRANSJ CARE MGMT MOD F2F 14D: CPT | Performed by: PHYSICIAN ASSISTANT

## 2025-06-03 PROCEDURE — 1159F MED LIST DOCD IN RCRD: CPT | Performed by: PHYSICIAN ASSISTANT

## 2025-06-03 PROCEDURE — 3008F BODY MASS INDEX DOCD: CPT | Performed by: PHYSICIAN ASSISTANT

## 2025-06-03 PROCEDURE — 1111F DSCHRG MED/CURRENT MED MERGE: CPT | Performed by: PHYSICIAN ASSISTANT

## 2025-06-03 PROCEDURE — 1125F AMNT PAIN NOTED PAIN PRSNT: CPT | Performed by: PHYSICIAN ASSISTANT

## 2025-06-03 RX ORDER — OXYCODONE AND ACETAMINOPHEN 5; 325 MG/1; MG/1
1 TABLET ORAL EVERY 6 HOURS PRN
Qty: 12 TABLET | Refills: 0 | Status: SHIPPED | OUTPATIENT
Start: 2025-06-03 | End: 2025-06-06

## 2025-06-03 RX ORDER — FUROSEMIDE 20 MG/1
20 TABLET ORAL
Qty: 180 TABLET | Refills: 1 | Status: SHIPPED | OUTPATIENT
Start: 2025-06-03 | End: 2025-09-01

## 2025-06-03 ASSESSMENT — ENCOUNTER SYMPTOMS
HEADACHES: 0
LOSS OF SENSATION IN FEET: 0
CHILLS: 0
RESPIRATORY NEGATIVE: 1
ALLERGIC/IMMUNOLOGIC NEGATIVE: 1
NUMBNESS: 0
JOINT SWELLING: 0
SPEECH DIFFICULTY: 0
DEPRESSION: 0
ACTIVITY CHANGE: 1
GASTROINTESTINAL NEGATIVE: 1
FATIGUE: 1
DIAPHORESIS: 0
FACIAL ASYMMETRY: 0
LIGHT-HEADEDNESS: 0
UNEXPECTED WEIGHT CHANGE: 0
NECK PAIN: 0
SEIZURES: 0
TREMORS: 0
DIZZINESS: 0
WEAKNESS: 0
NECK STIFFNESS: 0
ARTHRALGIAS: 0
EYES NEGATIVE: 1
PALPITATIONS: 0
OCCASIONAL FEELINGS OF UNSTEADINESS: 0
PSYCHIATRIC NEGATIVE: 1
HEMATOLOGIC/LYMPHATIC NEGATIVE: 1
FEVER: 0
MYALGIAS: 0
BACK PAIN: 1
ENDOCRINE NEGATIVE: 1
APPETITE CHANGE: 0

## 2025-06-03 ASSESSMENT — PAIN SCALES - GENERAL: PAINLEVEL_OUTOF10: 10-WORST PAIN EVER

## 2025-06-03 NOTE — PROGRESS NOTES
"Subjective  TCM.    Patient ID: Julia Carrion is a 65 y.o. female who presents for Hospital Follow-up (This patient was discharged from: [facility] /Discharge diagnosis: ST elevation myocardial infarction (STEMI), unspecified artery; Acute kidney injury /Date of discharge: 5/24/2025 //[facility] /).    HPI  Julia Carrion is seen for her chronic issues.  Patient recently had MI at [facility], 1 stent placed in the LAD. Echo showed LVEF of 40 %. SIENA during admission. Discharged 5/24/25. Has upcoming appointment with cardiology and nephrology. Pt is here with her  today. I reviewed the hospitalization notes/associated studies.     Doing better other than L sided scitica. Seen at ER. Percocet sent to [facility]-> Safe was broken, then sent to mail order pharmacy, has not come in yet. Pain 10/10. They are interested in acccupuncture. Avoid all NSAIDS. Did PT in the past, ran out of sessions covered by insurance.     SIENA resolved.  Cardiology appoitnment coming. Nephrology as well. Swelling on bilateral legs nearly resolved. Much better on lasix 20 mg BID.    Discharge summary  \"    Discharge Diagnosis  ST elevation myocardial infarction (STEMI), unspecified artery (Multi)     Issues Requiring Follow-Up  Follow up with Dr Bateman in 1 Week  Follow up with Dr. Blair in 2 Weeks following Basic Metabolic Panel  Cardiac Rehab     Test Results Pending At Discharge  Pending Labs         No current pending labs.                Hospital Course   Patient presented to the [facility] Emergency Department with complaints of chest pain. EKG in the emergency department revealing 1 mm ST elevation in the inferior leads, and a code STEMI was activated. The patient was taken emergently to the cardiac catheterization lab at which time catheterization revealed a critical 99% proximal LAD lesion. Successful percutaneous coronary intervention was performed with a Medtronic [place] 3.00x 15RX drug-eluting stent to " the proximal LAD. Echocardiogram completed during the hospitalization revealing a moderately decreased ejection fraction of 35-40%, abnormal wall motion with severe hypokinesis of the mid and distal anteroseptal wall and the entire LV apex. Initially, the patient was started on guideline directed medical therapy with Entresto and metoprolol succinate. She became hypotensive and creatinine did increase, at which time we discontinued the Entresto. Dr. Blair from nephrology services was consulted and cleared the patient for discharge with recommendations to follow up in his office in 2 weeks after completing a basic metabolic panel. She will resume her home dose of furosemide 20 mg twice daily at the recommendation of Dr. Blair, but we will continue to hold ACE/ARB or ARNI. The patient will continue on dual antiplatelet therapy with ticagrelor and aspirin for at least 1 year following PCI.  She has been started on a modified guideline directed therapy with metoprolol succinate 50 mg daily. Will continue atorvastatin 40 mg nightly. All new prescriptions were delivered bedside via the [facility] pharmacy prior to discharge. She should follow-up with Dr. Bateman in the outpatient setting in 1 week following discharge.     Pertinent Physical Exam At Time of Discharge  Physical Exam  Vitals reviewed.   HENT:      Head: Normocephalic and atraumatic.   Cardiovascular:      Rate and Rhythm: Normal rate and regular rhythm.      Heart sounds: No murmur heard.     No friction rub. No gallop.   Pulmonary:      Effort: Pulmonary effort is normal.      Breath sounds: Normal breath sounds. No wheezing, rhonchi or rales.      Comments: No conversational dyspnea noted  Abdominal:      General: Bowel sounds are normal.      Palpations: Abdomen is soft.   Musculoskeletal:      Right lower leg: Edema present.      Left lower leg: Edema present.      Comments: Trace-1+ bilateral lower extremity edema   Skin:     General: Skin is warm and dry.       Capillary Refill: Capillary refill takes less than 2 seconds.   Neurological:      Mental Status: She is alert and oriented to person, place, and time.   Psychiatric:         Mood and Affect: Mood normal.         Behavior: Behavior normal.            Home Medications     Medication List      START taking these medications     metoprolol succinate XL 50 mg 24 hr tablet; Commonly known as:   Toprol-XL; Take 1 tablet (50 mg) by mouth once daily. Do not crush or   chew.; Start taking on: May 25, 2025   nitroglycerin 0.4 mg SL tablet; Commonly known as: Nitrostat; Place 1   tablet (0.4 mg) under the tongue every 5 minutes if needed for chest pain   for up to 1 dose or as directed by provider. If no relief, call 911 as   directed.   ticagrelor 90 mg tablet; Commonly known as: Brilinta; Take 1 tablet (90   mg) by mouth 2 times a day.     CONTINUE taking these medications     anastrozole 1 mg tablet; Commonly known as: Arimidex; Take 1 tablet (1   mg total) by mouth once daily.  Swallow whole with a drink of water.   aspirin 81 mg EC tablet   atorvastatin 40 mg tablet; Commonly known as: Lipitor; Take 1 tablet (40   mg) by mouth once daily.   cyanocobalamin 1,000 mcg tablet; Commonly known as: Vitamin B-12   famotidine 20 mg tablet; Commonly known as: Pepcid; Take 1 tablet (20   mg) by mouth 2 times a day.   furosemide 20 mg tablet; Commonly known as: Lasix; Take 1 tablet (20 mg)   by mouth 2 times daily (morning and late afternoon).   magnesium oxide 400 mg magnesium capsule   multivitamin tablet   NON FORMULARY   traZODone 100 mg tablet; Commonly known as: Desyrel; Take 1 tablet (100   mg) by mouth once daily at bedtime.        Outpatient Follow-Up         Future Appointments   Date Time Provider Department Center   6/10/2025 10:45 AM India Johnston MD PhD [id number]   11/12/2025  2:00 PM Marimar Hutson PA-C [id number] Psychiatric   11/12/2025  2:30 PM INF 01 [id number] Psych[facility]         Marcia Prince,  "APRN-CNP            Electronically signed by DIONNE Chakraborty at 5/24/2025 12:17 PM         ED to Hosp-Admission (Discharged) on 5/22/2025         \"  Review of Systems   Constitutional:  Positive for activity change and fatigue. Negative for appetite change, chills, diaphoresis, fever and unexpected weight change.   HENT: Negative.     Eyes: Negative.    Respiratory: Negative.     Cardiovascular:  Positive for leg swelling (Improved greatly on diuretic). Negative for chest pain and palpitations.   Gastrointestinal: Negative.    Endocrine: Negative.    Genitourinary: Negative.    Musculoskeletal:  Positive for back pain and gait problem. Negative for arthralgias, joint swelling, myalgias, neck pain and neck stiffness.   Skin: Negative.    Allergic/Immunologic: Negative.    Neurological:  Negative for dizziness, tremors, seizures, syncope, facial asymmetry, speech difficulty, weakness, light-headedness, numbness and headaches.   Hematological: Negative.    Psychiatric/Behavioral: Negative.         Objective  Vitals:  /62   Pulse 65   Temp 36.4 °C (97.6 °F)   Ht 1.626 m (5' 4\")   Wt 75.8 kg (167 lb)   SpO2 96%   BMI 28.67 kg/m²     Physical Exam  Vitals and nursing note reviewed.   Constitutional:       General: She is not in acute distress.     Appearance: Normal appearance. She is not ill-appearing, toxic-appearing or diaphoretic.   HENT:      Head: Normocephalic and atraumatic.      Right Ear: External ear normal.      Left Ear: External ear normal.      Nose: Nose normal. No congestion.      Mouth/Throat:      Mouth: Mucous membranes are moist.      Pharynx: No oropharyngeal exudate or posterior oropharyngeal erythema.   Eyes:      General:         Right eye: No discharge.         Left eye: No discharge.      Extraocular Movements: Extraocular movements intact.      Conjunctiva/sclera: Conjunctivae normal.      Pupils: Pupils are equal, round, and reactive to light.   Neck:      Vascular: No " carotid bruit.   Cardiovascular:      Rate and Rhythm: Normal rate and regular rhythm.      Pulses: Normal pulses.      Heart sounds: Normal heart sounds.   Pulmonary:      Effort: Pulmonary effort is normal. No respiratory distress.      Breath sounds: Normal breath sounds. No stridor. No wheezing, rhonchi or rales.   Chest:      Chest wall: No tenderness.   Musculoskeletal:         General: No swelling, tenderness, deformity or signs of injury. Normal range of motion.      Cervical back: Normal range of motion. No rigidity or tenderness.      Right lower leg: Edema (Trace nonpitting) present.      Left lower leg: Edema (Trace nonpitting) present.   Lymphadenopathy:      Cervical: No cervical adenopathy.   Skin:     General: Skin is warm.      Capillary Refill: Capillary refill takes less than 2 seconds.      Coloration: Skin is not jaundiced or pale.      Findings: No bruising, erythema, lesion or rash.   Neurological:      General: No focal deficit present.      Mental Status: She is alert and oriented to person, place, and time.      Cranial Nerves: No cranial nerve deficit.      Sensory: No sensory deficit.      Motor: No weakness.      Coordination: Coordination normal.      Gait: Gait normal.      Deep Tendon Reflexes: Reflexes normal.   Psychiatric:         Mood and Affect: Mood normal.         Behavior: Behavior normal.         Thought Content: Thought content normal.         Judgment: Judgment normal.       CBC:   Lab Results   Component Value Date    WBC 10.8 05/24/2025    RBC 4.28 05/24/2025    HGB 13.8 05/24/2025    HCT 40.3 05/24/2025    MCV 94 05/24/2025    MCH 32.2 05/24/2025    MCHC 34.2 05/24/2025    RDWS 50.2 10/17/2022    RDWC 13.2 10/17/2022     05/24/2025    MPV 10.1 10/17/2022       CBC w/Diff:   Lab Results   Component Value Date    WBC 10.8 05/24/2025    NRBC 0.0 05/24/2025    RBC 4.28 05/24/2025    HGB 13.8 05/24/2025    HCT 40.3 05/24/2025    MCV 94 05/24/2025    MCHC 34.2  "05/24/2025     05/24/2025    RDW 12.5 05/24/2025    NEUTOPHILPCT 69.3 05/24/2025    IGPCT 0.5 05/24/2025    LYMPHOPCT 18.2 05/24/2025    MONOPCT 10.1 05/24/2025    EOSPCT 1.5 05/24/2025    BASOPCT 0.4 05/24/2025    NEUTROABS 7.45 05/24/2025    LYMPHSABS 1.96 05/24/2025    MONOSABS 1.09 (H) 05/24/2025    EOSABS 0.16 05/24/2025    BASOSABS 0.04 05/24/2025        CMP:  Lab Results   Component Value Date    GLUCOSE 117 (H) 06/02/2025     06/02/2025    K 3.9 06/02/2025    CL 98 06/02/2025    CO2 32 06/02/2025    ANIONGAP 10 06/02/2025    BUN 20 06/02/2025    CREATININE 0.93 06/02/2025    GFRF >90 09/06/2023    CALCIUM 9.7 06/02/2025    ALBUMIN 4.0 05/24/2025    ALKPHOS 80 05/24/2025    PROT 6.8 05/24/2025    AST 62 (H) 05/24/2025    BILITOT 0.7 05/24/2025    ALT 26 05/24/2025        BMP:  Lab Results   Component Value Date    GLUCOSE 117 (H) 06/02/2025    BUN 20 06/02/2025    CREATININE 0.93 06/02/2025    UREACREAUR 16.0 10/17/2022     06/02/2025    K 3.9 06/02/2025    CL 98 06/02/2025    CO2 32 06/02/2025    ANIONGAP 10 06/02/2025    CALCIUM 9.7 06/02/2025    EGFR 68 06/02/2025        Lipid:   Lab Results   Component Value Date    CHOL 122 05/23/2025    HDL 32.7 05/23/2025    CHHDL 3.7 05/23/2025    LDLCALC 48 05/23/2025    VLDL 41 (H) 05/23/2025    TRIG 205 (H) 05/23/2025       LDL Direct:  No results found for: \"LDLDIRECT\"     TSH:   Lab Results   Component Value Date    TSH 1.07 09/06/2023        B12:  Lab Results   Component Value Date    FHEPCAPC36 978 (H) 01/18/2023       Vitamin D:  Lab Results   Component Value Date    VITD25 48 08/31/2023        HgA1c:   Lab Results   Component Value Date    HGBA1C 5.8 (H) 05/23/2025    DBSJSTCW7C 120 05/23/2025       PSA:   No results found for: \"PSA\"    FreeT4:  No results found for: \"FREET4\"    T3:   No results found for: \"T3FREE\"       Contains abnormal data Comprehensive metabolic panel  Order: [id number]   Status: Final result    Test Result Released: " Yes (not seen)    0 Result Notes       2 HM Topics            Component  Ref Range & Units 10 d ago  (5/24/25) 11 d ago  (5/23/25) 12 d ago  (5/22/25) 1 mo ago  (4/30/25) 6 mo ago  (11/13/24) 1 yr ago  (5/15/24) 1 yr ago  (12/7/23)   Glucose  74 - 99 mg/dL 121 High  135 High  144 High  86 78 125 High  101 High  R   Sodium  136 - 145 mmol/L 136 136 136 141 140 139 138 R   Potassium  3.5 - 5.3 mmol/L 3.7 3.8 4.1 4.2 4.1 4.1 4.2 R   Chloride  98 - 107 mmol/L 103 104 99 104 104 98 95 Low  R   Bicarbonate  21 - 32 mmol/L 26 27 25 30 29 32 32 High  R   Anion Gap  10 - 20 mmol/L 11 9 Low  16 11 11 13 11 R   Urea Nitrogen  6 - 23 mg/dL 25 High  17 13 11 13 8 6 Low  R   Creatinine  0.50 - 1.05 mg/dL 1.16 High  0.84 0.92 0.65 0.60 0.69 0.70 R   eGFR  >60 mL/min/1.73m*2 52 Low  77 CM 69 CM >90 CM >90 CM >90 CM >90 CM   Comment: Calculations of estimated GFR are performed using the 2021 CKD-EPI Study Refit equation without the race variable for the IDMS-Traceable creatinine methods.  https://jasn.asnjournals.org/content/early/2021/09/22/ASN.4060508261   Calcium  8.6 - 10.3 mg/dL 8.7 9.1 9.6 9.4 10.0 10.1 9.0 R   Albumin  3.4 - 5.0 g/dL 4.0 4.0 4.6 4.4 4.3 4.5 3.5 R   Alkaline Phosphatase  33 - 136 U/L 80 85 95 87 88 83 133 High  R   Total Protein  6.4 - 8.2 g/dL 6.8 7.0 8.0 7.3 7.4 7.7 6.5 R   AST  9 - 39 U/L 62 High  144 High  279 High  14 14 15 34 R   Bilirubin, Total  0.0 - 1.2 mg/dL 0.7 0.7 0.6 0.3 0.3 0.6 0.5 R   ALT  7 - 45 U/L 26 36 CM 42 CM 10 CM 11 CM 11 CM 19 R   Comment: Patients treated with Sulfasalazine may generate falsely decreased results for ALT.   Resulting Agency [facility]             Specimen Collected: 05/24/25 04:15 Last Resulted: 05/24/25 05:42        Lab Flowsheet        Order Details        View Encounter        Lab and Collection Details           Contains abnormal data B-type natriuretic peptide  Order: [id number]   Status: Final result    Test Result Released: Yes (not  seen)    0 Result Notes      Component  Ref Range & Units 12 d ago   BNP  0 - 99 pg/mL 294 High    Resulting Agency [facility]              Narrative  Performed by: LAKEW     <100 pg/mL - Heart failure unlikely  100-299 pg/mL - Intermediate probability of acute heart                  failure exacerbation. Correlate with clinical                  context and patient history.    >=300 pg/mL - Heart Failure likely. Correlate with clinical                  context and patient history.    BNP testing is performed usi      Contains critical data Troponin, High Sensitivity, 1 Hour  Order: [id number] - Part of Panel Order [id number]   Status: Final result    Test Result Released: Yes (not seen)    0 Result Notes       Component  Ref Range & Units 12 d ago  (5/22/25) 12 d ago  (5/22/25)   Troponin I, High Sensitivity  0 - 13 ng/L 49,951 High Panic  54,983 High Panic    Comment: Previous result verified on 5/22/2025 1116 on specimen/case [id number] called with component TRPHS for procedure Troponin I, High Sensitivity, Initial with value 54,983 ng/L.   [facility]            Echo 5/2025  PHYSICIAN INTERPRETATION:  Left Ventricle: The left ventricular systolic function is moderately decreased, with a visually estimated ejection fraction of 35-40%. There is severe concentric left ventricular hypertrophy. Wall motion is abnormal. The left ventricular cavity size is normal. There is normal septal and mildly increased posterior left ventricular wall thickness. Spectral Doppler shows an abnormal pattern of left ventricular diastolic filling. There is severe hypokinesis of the mid and distal anteroseptal wall and the entire LV apex.  Left Atrium: The left atrial size is normal.  Right Ventricle: The right ventricle is normal in size. There is normal right ventriclar wall thickness. There is normal right ventricular global systolic function.  Right Atrium: The right atrial size is normal.  Aortic Valve: The aortic  valve is trileaflet. There is no aortic valve cusp calcification noted. There is no evidence of reduced aortic valve leaflet excursion excursion. The aortic valve dimensionless index is 0.80. There is no evidence of aortic valve regurgitation. The peak instantaneous gradient of the aortic valve is 9 mmHg. The mean gradient of the aortic valve is 4 mmHg.  Mitral Valve: The mitral valve is normal in structure. There is normal mitral valve leaflet mobility. There is trace mitral valve regurgitation.  Tricuspid Valve: The tricuspid valve is structurally normal. There is normal tricuspid valve leaflet mobility. There is trace tricuspid regurgitation.  Pulmonic Valve: The pulmonic valve is structurally normal. There is no indication of pulmonic valve regurgitation.  Pericardium: No pericardial effusion noted.  Aorta: The aortic root is normal. There is no dilatation of the aortic arch. There is no dilatation of the ascending aorta. There is no dilatation of the aortic root.  Pulmonary Artery: The pulmonary artery is normal in size. The estimated pulmonary artery pressure is normal.  Systemic Veins: The inferior vena cava appears normal in size, with IVC inspiratory collapse greater than 50%.        CONCLUSIONS:   1. The left ventricular systolic function is moderately decreased, with a visually estimated ejection fraction of 35-40%.   2. Abnormal wall motion.   3. Spectral Doppler shows an abnormal pattern of left ventricular diastolic filling.   4. There is severe hypokinesis of the mid and distal anteroseptal wall and the entire LV apex      CXR 5/22/2025  Narrative & Impression   Interpreted By:  James Tyson,   STUDY:  XR CHEST 1 VIEW;  5/22/2025 10:26 am      INDICATION:  Signs/Symptoms:STEMI.          COMPARISON:  None.      ACCESSION NUMBER(S):  [id number]      ORDERING CLINICIAN:  JOSE SHIPMAN      FINDINGS:                  CARDIOMEDIASTINAL SILHOUETTE:  Cardiomediastinal silhouette is normal in size and  configuration.      LUNGS:  Increased perihilar lung markings present. Findings suggestive of  mild interstitial edema. There is no consolidation or effusion      ABDOMEN:  No remarkable upper abdominal findings.      BONES:  No acute osseous changes.      IMPRESSION:  1. Findings suggestive of mild interstitial edema                 XR Lumbar spine  Narrative & Impression   Interpreted By:  Keven Norton,   STUDY:  XR LUMBAR SPINE COMPLETE 4+ VIEWS      INDICATION:  Signs/Symptoms:Lower Back Pain.      COMPARISON:  None      ACCESSION NUMBER(S):  [id number]      ORDERING CLINICIAN:  MICHAEL MAGAÑA      FINDINGS:  Moderate lumbar degenerative changes L2-S1 with a minimal scoliosis.      Subluxation or pathologic motion.      IMPRESSION:  Moderate lumbar degenerative changes L2-S1 with a minimal scoliosis.     1. Hospital discharge follow-up     Overall doing well post MI/Stent. Weight is stable. Pt is feeling better other than back pain. Has upcoming appointment with cardiology and nephrology. EF was reduced on hospital echo, this may be from the MI. Unclear if she will continue with acute heart failure, encouraged patient to discuss obtaining a new echo w/ her cardiologist. If evidence of CHF is maintained, consider jardiance 10 mg/day.     2. Acute systolic heart failure  furosemide (Lasix) 20 mg tablet   See above. Unclear status given original dysfunction may have been from MI., LVEF may rebound, consider new echo.   3. History of MI (myocardial infarction)     LAD stent x 1.   4. Acute kidney injury     Resolved.   5. Chronic left-sided low back pain with left-sided sciatica  oxyCODONE-acetaminophen (Percocet) 5-325 mg tablet   Cannot take NSAIDS due to recent MI. PT got percocet from the ER but sent to mail order service, pt has not received this yet and is in significant pain. Will provide 3 days of percocet to local pharmacy. Recommend seeing acupuncturist. If insurance will cover, recommend further PT.  Recommend additional OTC tylenol. Not to exceed total dose of tylenol 1000 mg every 6 hours including the percocet.   OAARS checked and verified. No evidence of fraud or suspicious activity. UDS and controlled substance agreement not performed as I do not intend this to be long term due to risk of addiction. Referral to [facility]      6. Bilateral leg edema  furosemide (Lasix) 20 mg tablet   Vastly improved per patient on lasix. SIENA resolved, tolerating diuretic,   7. Prediabetes     Recommended cardiac rehab and mediterranean diet.      Schedule with accupuncture.  Keep appointment with cardiology and nephrology  Consider jardiance 10 mg/day of evidence of heart failure on echo (if ordered by cardiology)  Return to clinic in 3 month    If symptoms severely worsen or you experience any new concerning symptoms, go to the nearest emergency room or call 911 as needed.

## 2025-06-03 NOTE — PATIENT INSTRUCTIONS
Schedule with accupuncture.  Keep appointment with cardiology and nephrology  Consider jardiance 10 mg/day of evidence of heart failure on echo (if ordered by cardiology)  Return to clinic in 3 month    If symptoms severely worsen or you experience any new concerning symptoms, go to the nearest emergency room or call 911 as needed.

## 2025-06-04 ENCOUNTER — OFFICE VISIT (OUTPATIENT)
Facility: CLINIC | Age: 65
End: 2025-06-04
Payer: MEDICARE

## 2025-06-04 VITALS
HEIGHT: 61 IN | HEART RATE: 85 BPM | DIASTOLIC BLOOD PRESSURE: 50 MMHG | WEIGHT: 162 LBS | SYSTOLIC BLOOD PRESSURE: 88 MMHG | BODY MASS INDEX: 30.58 KG/M2 | OXYGEN SATURATION: 99 % | RESPIRATION RATE: 14 BRPM

## 2025-06-04 DIAGNOSIS — Z86.79 PERSONAL HISTORY OF OTHER DISEASES OF THE CIRCULATORY SYSTEM: ICD-10-CM

## 2025-06-04 DIAGNOSIS — I25.2 HISTORY OF MI (MYOCARDIAL INFARCTION): ICD-10-CM

## 2025-06-04 DIAGNOSIS — I50.21 ACUTE SYSTOLIC HEART FAILURE: ICD-10-CM

## 2025-06-04 DIAGNOSIS — I69.354 HEMIPLEGIA AND HEMIPARESIS FOLLOWING CEREBRAL INFARCTION AFFECTING LEFT NON-DOMINANT SIDE (MULTI): ICD-10-CM

## 2025-06-04 DIAGNOSIS — E78.2 MIXED HYPERLIPIDEMIA: ICD-10-CM

## 2025-06-04 DIAGNOSIS — I21.02 ST ELEVATION MYOCARDIAL INFARCTION INVOLVING LEFT ANTERIOR DESCENDING CORONARY ARTERY (MULTI): ICD-10-CM

## 2025-06-04 DIAGNOSIS — Z95.5 STENTED CORONARY ARTERY: Primary | ICD-10-CM

## 2025-06-04 DIAGNOSIS — C77.3 SECONDARY AND UNSPECIFIED MALIGNANT NEOPLASM OF AXILLA AND UPPER LIMB LYMPH NODES: ICD-10-CM

## 2025-06-04 DIAGNOSIS — I21.4 NON-ST ELEVATION (NSTEMI) MYOCARDIAL INFARCTION (MULTI): Primary | ICD-10-CM

## 2025-06-04 DIAGNOSIS — I21.3 STEMI (ST ELEVATION MYOCARDIAL INFARCTION) (MULTI): ICD-10-CM

## 2025-06-04 LAB
ATRIAL RATE: 69 BPM
P AXIS: 9 DEGREES
P OFFSET: 191 MS
P ONSET: 157 MS
PR INTERVAL: 142 MS
Q ONSET: 228 MS
QRS COUNT: 12 BEATS
QRS DURATION: 66 MS
QT INTERVAL: 428 MS
QTC CALCULATION(BAZETT): 458 MS
QTC FREDERICIA: 448 MS
R AXIS: 24 DEGREES
T AXIS: -27 DEGREES
T OFFSET: 442 MS
VENTRICULAR RATE: 69 BPM

## 2025-06-04 PROCEDURE — 1111F DSCHRG MED/CURRENT MED MERGE: CPT | Performed by: INTERNAL MEDICINE

## 2025-06-04 PROCEDURE — 99214 OFFICE O/P EST MOD 30 MIN: CPT | Performed by: INTERNAL MEDICINE

## 2025-06-04 PROCEDURE — 3008F BODY MASS INDEX DOCD: CPT | Performed by: INTERNAL MEDICINE

## 2025-06-04 PROCEDURE — 1126F AMNT PAIN NOTED NONE PRSNT: CPT | Performed by: INTERNAL MEDICINE

## 2025-06-04 PROCEDURE — 1159F MED LIST DOCD IN RCRD: CPT | Performed by: INTERNAL MEDICINE

## 2025-06-04 PROCEDURE — 99212 OFFICE O/P EST SF 10 MIN: CPT

## 2025-06-04 PROCEDURE — 1160F RVW MEDS BY RX/DR IN RCRD: CPT | Performed by: INTERNAL MEDICINE

## 2025-06-04 RX ORDER — TICAGRELOR 90 MG/1
90 TABLET, FILM COATED ORAL 2 TIMES DAILY
Qty: 60 TABLET | Refills: 11 | Status: SHIPPED | OUTPATIENT
Start: 2025-06-04 | End: 2025-06-04

## 2025-06-04 RX ORDER — TICAGRELOR 90 MG/1
90 TABLET, FILM COATED ORAL 2 TIMES DAILY
Qty: 180 TABLET | Refills: 3 | Status: SHIPPED | OUTPATIENT
Start: 2025-06-04 | End: 2026-06-04

## 2025-06-04 ASSESSMENT — LIFESTYLE VARIABLES
AUDIT TOTAL SCORE: 4
SKIP TO QUESTIONS 9-10: 1
AUDIT-C TOTAL SCORE: 4
HOW OFTEN DO YOU HAVE A DRINK CONTAINING ALCOHOL: 4 OR MORE TIMES A WEEK
HOW MANY STANDARD DRINKS CONTAINING ALCOHOL DO YOU HAVE ON A TYPICAL DAY: 1 OR 2
HAVE YOU OR SOMEONE ELSE BEEN INJURED AS A RESULT OF YOUR DRINKING: NO
HOW OFTEN DO YOU HAVE SIX OR MORE DRINKS ON ONE OCCASION: NEVER
HAS A RELATIVE, FRIEND, DOCTOR, OR ANOTHER HEALTH PROFESSIONAL EXPRESSED CONCERN ABOUT YOUR DRINKING OR SUGGESTED YOU CUT DOWN: NO

## 2025-06-04 ASSESSMENT — ENCOUNTER SYMPTOMS
DEPRESSION: 0
LOSS OF SENSATION IN FEET: 0
OCCASIONAL FEELINGS OF UNSTEADINESS: 0

## 2025-06-04 ASSESSMENT — PAIN SCALES - GENERAL: PAINLEVEL_OUTOF10: 0-NO PAIN

## 2025-06-04 NOTE — PROGRESS NOTES
[facility] [place]    Interventional Cardiology    History of present illness:  This is very pleasant 65-year-old female with history of smoking, hypertension, hyperlipidemia.  Patient presents to my office after hospitalization for non-STEMI on May 22, 2025 underwent cardiac catheterization showing critical 99% proximal LAD that was treated with drug-eluting stent without complications.  Ejection fraction 35 to 40% with anterior wall severe hypokinesis.  Patient was started on metoprolol did not tolerate any ACE inhibitors due to low blood pressure.  Patient is currently on aspirin Brilinta atorvastatin 40 mg oral daily with controlled LDL and metoprolol succinate 50 mg oral daily.  She also has been taking furosemide 20 mg oral twice daily.  Feeling better.  Reports some shortness of breath with moderate activity.  Denies any nausea vomiting diaphoresis or syncope.    Medical History[1]    Surgical History[2]    Allergies[3]     reports that she has been smoking cigarettes. She started smoking about 40 years ago. She has a 60.6 pack-year smoking history. She has never been exposed to tobacco smoke. She has never used smokeless tobacco. She reports current alcohol use of about 21.0 standard drinks of alcohol per week. She reports that she does not use drugs.    Family History[4]    Patient's Medications   New Prescriptions    No medications on file   Previous Medications    ACETAMINOPHEN (TYLENOL) 500 MG TABLET    Take 2 tablets (1,000 mg) by mouth every 6 hours if needed for mild pain (1 - 3) for up to 10 days.    ANASTROZOLE (ARIMIDEX) 1 MG TABLET    Take 1 tablet (1 mg total) by mouth once daily.  Swallow whole with a drink of water.    ASPIRIN 81 MG EC TABLET    Take 1 tablet (81 mg) by mouth once daily.    ATORVASTATIN (LIPITOR) 40 MG TABLET    Take 1 tablet (40 mg) by mouth once daily.    CYANOCOBALAMIN (VITAMIN B-12) 1,000 MCG TABLET    Take 1 tablet (1,000 mcg)  by mouth once daily.    FAMOTIDINE (PEPCID) 20 MG TABLET    Take 1 tablet (20 mg) by mouth 2 times a day.    FUROSEMIDE (LASIX) 20 MG TABLET    Take 1 tablet (20 mg) by mouth 2 times daily (morning and late afternoon).    IBUPROFEN 400 MG TABLET    Take 1 tablet (400 mg) by mouth every 6 hours for 7 days.    MAGNESIUM OXIDE 400 MG MAGNESIUM CAPSULE    Take by mouth.    METOPROLOL SUCCINATE XL (TOPROL-XL) 50 MG 24 HR TABLET    Take 1 tablet (50 mg) by mouth once daily. Do not crush or chew.    MULTIVITAMIN TABLET    Take 1 tablet by mouth once daily.    NITROGLYCERIN (NITROSTAT) 0.4 MG SL TABLET    Place 1 tablet (0.4 mg) under the tongue every 5 minutes if needed for chest pain for up to 1 dose or as directed by provider. If no relief, call 911 as directed.    NON FORMULARY    Folic acid 1mg tab daily    OXYCODONE-ACETAMINOPHEN (PERCOCET) 5-325 MG TABLET    Take 1 tablet by mouth every 6 hours if needed for severe pain (7 - 10) for up to 3 days.    OXYCODONE-ACETAMINOPHEN (PERCOCET) 5-325 MG TABLET    Take 1 tablet by mouth every 6 hours if needed for severe pain (7 - 10) for up to 3 days.    TICAGRELOR (BRILINTA) 90 MG TABLET    Take 1 tablet (90 mg) by mouth 2 times a day.    TRAZODONE (DESYREL) 100 MG TABLET    Take 1 tablet (100 mg) by mouth once daily at bedtime.   Modified Medications    No medications on file   Discontinued Medications    No medications on file       Objective   Physical Exam  General: Patient in no acute distress   HEENT: Atraumatic normocephalic.  Neck: Supple, jugular venous pressure within normal limit.  No bruits  Lungs: Clear to auscultation bilaterally  Cardiovascular: Regular rate and rhythm, normal heart sounds, no murmurs rubs or gallops  Abdomen: Soft nontender nondistended.  Normal bowel sounds.  Extremities: Warm to touch, 2+ edema.      Lab Review   Admission on 06/01/2025, Discharged on 06/01/2025   Component Date Value    Ventricular Rate 06/01/2025 69     Atrial Rate  06/01/2025 69     [place] Interval 06/01/2025 142     QRS Duration 06/01/2025 66     QT Interval 06/01/2025 428     QTC Calculation(Bazett) 06/01/2025 458     P Axis 06/01/2025 9     R [place] 06/01/2025 24     T Axis 06/01/2025 -27     QRS Count 06/01/2025 12     Q Onset 06/01/2025 228     P Onset 06/01/2025 157     P Offset 06/01/2025 191     T Offset 06/01/2025 442     QTC Fredericia 06/01/2025 448    Orders Only on 05/31/2025   Component Date Value    GLUCOSE 06/02/2025 117 (H)     UREA NITROGEN (BUN) 06/02/2025 20     CREATININE 06/02/2025 0.93     EGFR 06/02/2025 68     BUN/CREATININE RATIO 06/02/2025 SEE NOTE:     SODIUM 06/02/2025 140     POTASSIUM 06/02/2025 3.9     CHLORIDE 06/02/2025 98     CARBON DIOXIDE 06/02/2025 32     ELECTROLYTE BALANCE 06/02/2025 10     CALCIUM 06/02/2025 9.7    Admission on 05/22/2025, Discharged on 05/24/2025   Component Date Value    aPTT 05/22/2025 34.6 (H)     Protime 05/22/2025 11.2     INR 05/22/2025 1.0     WBC 05/22/2025 14.3 (H)     nRBC 05/22/2025 0.0     RBC 05/22/2025 4.74     Hemoglobin 05/22/2025 15.4     Hematocrit 05/22/2025 45.1     MCV 05/22/2025 95     MCH 05/22/2025 32.5     MCHC 05/22/2025 34.1     RDW 05/22/2025 12.5     Platelets 05/22/2025 230     Neutrophils % 05/22/2025 78.0     Immature Granulocytes %,* 05/22/2025 0.4     Lymphocytes % 05/22/2025 13.8     Monocytes % 05/22/2025 7.2     Eosinophils % 05/22/2025 0.3     Basophils % 05/22/2025 0.3     Neutrophils Absolute 05/22/2025 11.16 (H)     Immature Granulocytes Ab* 05/22/2025 0.06     Lymphocytes Absolute 05/22/2025 1.97     Monocytes Absolute 05/22/2025 1.03 (H)     Eosinophils Absolute 05/22/2025 0.04     Basophils Absolute 05/22/2025 0.05     Glucose 05/22/2025 144 (H)     Sodium 05/22/2025 136     Potassium 05/22/2025 4.1     Chloride 05/22/2025 99     Bicarbonate 05/22/2025 25     Anion Gap 05/22/2025 16     Urea Nitrogen 05/22/2025 13     Creatinine 05/22/2025 0.92     eGFR 05/22/2025 69     Calcium  05/22/2025 9.6     Albumin 05/22/2025 4.6     Alkaline Phosphatase 05/22/2025 95     Total Protein 05/22/2025 8.0     AST 05/22/2025 279 (H)     Bilirubin, Total 05/22/2025 0.6     ALT 05/22/2025 42     Troponin I, High Sensiti* 05/22/2025 54,983 (HH)     Troponin I, High Sensiti* 05/22/2025 49,951 (HH)     Ventricular Rate 05/22/2025 85     Atrial Rate 05/22/2025 85     [place] Interval 05/22/2025 162     QRS Duration 05/22/2025 70     QT Interval 05/22/2025 366     QTC Calculation(Bazett) 05/22/2025 435     P Axis 05/22/2025 54     R Axis 05/22/2025 183     T Axis 05/22/2025 19     QRS Count 05/22/2025 14     Q Onset 05/22/2025 228     P Onset 05/22/2025 147     P Offset 05/22/2025 197     T Offset 05/22/2025 411     QTC Fredericia 05/22/2025 411     POCT Activated Clotting * 05/22/2025 287 (H)     AV pk cecile 05/22/2025 1.47     LVOT diam 05/22/2025 1.90     AV mn grad 05/22/2025 4     MV E/A ratio 05/22/2025 0.84     LV Biplane EF 05/22/2025 49     LV EF 05/22/2025 38     LVIDd 05/22/2025 5.47     AV pk grad 05/22/2025 9     Aortic Valve Area by Con* 05/22/2025 2.28     Aortic Valve Area by Con* 05/22/2025 2.29     LV A4C EF 05/22/2025 52.7     Magnesium 05/22/2025 2.14     Phosphorus 05/22/2025 2.9     BNP 05/22/2025 294 (H)     WBC 05/23/2025 11.7 (H)     nRBC 05/23/2025 0.0     RBC 05/23/2025 4.33     Hemoglobin 05/23/2025 14.0     Hematocrit 05/23/2025 41.4     MCV 05/23/2025 96     MCH 05/23/2025 32.3     MCHC 05/23/2025 33.8     RDW 05/23/2025 12.5     Platelets 05/23/2025 185     Neutrophils % 05/23/2025 67.8     Immature Granulocytes %,* 05/23/2025 0.3     Lymphocytes % 05/23/2025 19.3     Monocytes % 05/23/2025 10.8     Eosinophils % 05/23/2025 1.4     Basophils % 05/23/2025 0.4     Neutrophils Absolute 05/23/2025 7.89 (H)     Immature Granulocytes Ab* 05/23/2025 0.04     Lymphocytes Absolute 05/23/2025 2.25     Monocytes Absolute 05/23/2025 1.26 (H)     Eosinophils Absolute 05/23/2025 0.16     Basophils  Absolute 05/23/2025 0.05     Glucose 05/23/2025 135 (H)     Sodium 05/23/2025 136     Potassium 05/23/2025 3.8     Chloride 05/23/2025 104     Bicarbonate 05/23/2025 27     Anion Gap 05/23/2025 9 (L)     Urea Nitrogen 05/23/2025 17     Creatinine 05/23/2025 0.84     eGFR 05/23/2025 77     Calcium 05/23/2025 9.1     Albumin 05/23/2025 4.0     Alkaline Phosphatase 05/23/2025 85     Total Protein 05/23/2025 7.0     AST 05/23/2025 144 (H)     Bilirubin, Total 05/23/2025 0.7     ALT 05/23/2025 36     Magnesium 05/23/2025 2.09     Phosphorus 05/23/2025 3.2     Cholesterol 05/23/2025 122     HDL-Cholesterol 05/23/2025 32.7     Cholesterol/HDL Ratio 05/23/2025 3.7     LDL Calculated 05/23/2025 48     VLDL 05/23/2025 41 (H)     Triglycerides 05/23/2025 205 (H)     Non HDL Cholesterol 05/23/2025 89     POCT Calcium, Ionized 05/23/2025 1.14     Hemoglobin A1C 05/23/2025 5.8 (H)     Estimated Average Glucose 05/23/2025 120     Ventricular Rate 05/23/2025 70     Atrial Rate 05/23/2025 70     OH Interval 05/23/2025 156     QRS Duration 05/23/2025 82     QT Interval 05/23/2025 450     QTC Calculation(Bazett) 05/23/2025 486     P Axis 05/23/2025 61     R [place] 05/23/2025 -35     T Axis 05/23/2025 125     QRS Count 05/23/2025 12     Q Onset 05/23/2025 221     P Onset 05/23/2025 143     P Offset 05/23/2025 196     T Offset 05/23/2025 446     QTC Fredericia 05/23/2025 474     WBC 05/24/2025 10.8     nRBC 05/24/2025 0.0     RBC 05/24/2025 4.28     Hemoglobin 05/24/2025 13.8     Hematocrit 05/24/2025 40.3     MCV 05/24/2025 94     MCH 05/24/2025 32.2     MCHC 05/24/2025 34.2     RDW 05/24/2025 12.5     Platelets 05/24/2025 186     Neutrophils % 05/24/2025 69.3     Immature Granulocytes %,* 05/24/2025 0.5     Lymphocytes % 05/24/2025 18.2     Monocytes % 05/24/2025 10.1     Eosinophils % 05/24/2025 1.5     Basophils % 05/24/2025 0.4     Neutrophils Absolute 05/24/2025 7.45     Immature Granulocytes Ab* 05/24/2025 0.05     Lymphocytes  Absolute 05/24/2025 1.96     Monocytes Absolute 05/24/2025 1.09 (H)     Eosinophils Absolute 05/24/2025 0.16     Basophils Absolute 05/24/2025 0.04     Glucose 05/24/2025 121 (H)     Sodium 05/24/2025 136     Potassium 05/24/2025 3.7     Chloride 05/24/2025 103     Bicarbonate 05/24/2025 26     Anion Gap 05/24/2025 11     Urea Nitrogen 05/24/2025 25 (H)     Creatinine 05/24/2025 1.16 (H)     eGFR 05/24/2025 52 (L)     Calcium 05/24/2025 8.7     Albumin 05/24/2025 4.0     Alkaline Phosphatase 05/24/2025 80     Total Protein 05/24/2025 6.8     AST 05/24/2025 62 (H)     Bilirubin, Total 05/24/2025 0.7     ALT 05/24/2025 26     Ventricular Rate 05/24/2025 70     Atrial Rate 05/24/2025 70     MS Interval 05/24/2025 166     QRS Duration 05/24/2025 80     QT Interval 05/24/2025 478     QTC Calculation(Bazett) 05/24/2025 516     P Axis 05/24/2025 63     R [place] 05/24/2025 -21     T Axis 05/24/2025 126     QRS Count 05/24/2025 11     Q Onset 05/24/2025 220     P Onset 05/24/2025 137     P Offset 05/24/2025 191     T Offset 05/24/2025 459     QTC Fredericia 05/24/2025 503    Lab on 04/30/2025   Component Date Value    Glucose 04/30/2025 86     Sodium 04/30/2025 141     Potassium 04/30/2025 4.2     Chloride 04/30/2025 104     Bicarbonate 04/30/2025 30     Anion Gap 04/30/2025 11     Urea Nitrogen 04/30/2025 11     Creatinine 04/30/2025 0.65     eGFR 04/30/2025 >90     Calcium 04/30/2025 9.4     Albumin 04/30/2025 4.4     Alkaline Phosphatase 04/30/2025 87     Total Protein 04/30/2025 7.3     AST 04/30/2025 14     Bilirubin, Total 04/30/2025 0.3     ALT 04/30/2025 10     Magnesium 04/30/2025 2.03     Phosphorus 04/30/2025 2.7         Assessment/Plan   Problem List[5]   This is very pleasant 65-year-old female with history of smoking, hypertension, hyperlipidemia.  Patient presents to my office after hospitalization for non-STEMI on May 22, 2025 underwent cardiac catheterization showing critical 99% proximal LAD that was treated  with drug-eluting stent without complications.  Ejection fraction 35 to 40% with anterior wall severe hypokinesis.  Patient was started on metoprolol did not tolerate any ACE inhibitors due to low blood pressure.  Patient is currently on aspirin Brilinta atorvastatin 40 mg oral daily with controlled LDL and metoprolol succinate 50 mg oral daily.  She also has been taking furosemide 20 mg oral twice daily.  Feeling better.  Reports some shortness of breath with moderate activity.  Denies any nausea vomiting diaphoresis or syncope.    Patient still has lower extremity edema likely secondary to venous stasis.  JVP looks okay.  She is on optimal medical therapy for the time being.  Unfortunately she will not tolerate any distal dilator due to low blood pressure overall but she is asymptomatic.  Reviewed her labs from June 2, 2025.  Renal function stable.  Bicarbonate stable potassium stable.  Continue current medications.  Will follow-up in 3 months.  Will repeat another echo before she comes in see me next visit to make sure that ejection fraction has improved after PCI.  I discussed with her lifestyle modification and slowly cessation.      Maribel Bateman MD              [1]   Past Medical History:  Diagnosis Date    Breast cancer     Cancer (Multi)     Deficiency of other specified B group vitamins     Folate deficiency    Personal history of diseases of the blood and blood-forming organs and certain disorders involving the immune mechanism     History of macrocytosis    Personal history of other diseases of the circulatory system     History of hypertension    Personal history of other diseases of the nervous system and sense organs     History of peripheral neuropathy    Personal history of other endocrine, nutritional and metabolic disease     History of hypokalemia    Personal history of transient ischemic attack (TIA), and cerebral infarction without residual deficits     History of stroke    Sleep apnea      Stroke (Multi)    [2]   Past Surgical History:  Procedure Laterality Date    BI STEREOTACTIC GUIDED BREAST LEFT LOCALIZATION AND BIOPSY Left 03/28/2022    BI STEREOTACTIC GUIDED BREAST LOCALIZATION AND BIOPSY LEFT [facility]    BI US GUIDED BREAST LOCALIZATION AND BIOPSY RIGHT Right 03/28/2022    BI US GUIDED BREAST LOCALIZATION AND BIOPSY RIGHT [facility]    BI US GUIDED BREAST LOCALIZATION RIGHT Right 09/26/2022    BI US GUIDED BREAST LOCALIZATION RIGHT [facility]    BREAST SURGERY      CARDIAC CATHETERIZATION N/A 05/22/2025    Procedure: LHC, No LV;  Surgeon: Maribel Bateman MD;  Location: [facility];  Service: Cardiovascular;  Laterality: N/A;    CARDIAC CATHETERIZATION N/A 05/22/2025    Procedure: PCI;  Surgeon: Maribel Bateman MD;  Location: [facility];  Service: Cardiovascular;  Laterality: N/A;    EYE SURGERY      HYSTERECTOMY      LYMPH NODE BIOPSY      MASTECTOMY      MR HEAD ANGIO WO IV CONTRAST  10/26/2021    MR HEAD ANGIO WO IV CONTRAST [facility]    OTHER SURGICAL HISTORY  01/04/2022    Cataract surgery    OTHER SURGICAL HISTORY  01/04/2022    Hysterectomy    TUBAL LIGATION      US GUIDED BIOPSY LYMPH NODE SUPERFICIAL  03/28/2022    US GUIDED BIOPSY LYMPH NODE SUPERFICIAL [facility]   [3] No Known Allergies  [4]   Family History  Problem Relation Name Age of Onset    Cancer Sister La     Breast cancer Sister La    [5]   Patient Active Problem List  Diagnosis    Personal history of breast cancer    Acid reflux    Deficiency of other specified B group vitamins    Personal history of diseases of the blood and blood-forming organs and certain disorders involving the immune mechanism    Personal history of other diseases of the circulatory system    Personal history of other diseases of the nervous system and sense organs    Personal history of other endocrine, nutritional and metabolic disease    Personal history of transient ischemic  attack (TIA), and cerebral infarction without residual deficits    Malignant neoplasm of right breast in female, estrogen receptor positive    Hyperlipidemia    Low back pain    Bilateral leg edema    Acute systolic heart failure    Myocardial infarction involving left anterior descending (LAD) coronary artery (Multi)    Prediabetes    History of MI (myocardial infarction)

## 2025-06-05 PROBLEM — C77.3 SECONDARY AND UNSPECIFIED MALIGNANT NEOPLASM OF AXILLA AND UPPER LIMB LYMPH NODES: Status: ACTIVE | Noted: 2025-06-05

## 2025-06-05 PROBLEM — I69.354 HEMIPLEGIA AND HEMIPARESIS FOLLOWING CEREBRAL INFARCTION AFFECTING LEFT NON-DOMINANT SIDE (MULTI): Status: ACTIVE | Noted: 2025-06-05

## 2025-06-09 ENCOUNTER — APPOINTMENT (OUTPATIENT)
Dept: CARDIAC REHAB | Facility: CLINIC | Age: 65
End: 2025-06-09
Payer: MEDICARE

## 2025-06-09 DIAGNOSIS — I21.4 NON-ST ELEVATION (NSTEMI) MYOCARDIAL INFARCTION (MULTI): ICD-10-CM

## 2025-06-09 NOTE — PROGRESS NOTES
Cardiac Rehabilitation Individual Treatment Plan  Initial Treatment Plan      Today's Date:   6/9/2025                       Program Location: [address], [place]    Name: Julia Carrino                            Medical Record Number: [id number]                          YOB: 1960    Referring Physician: Maribel Bateman MD  [address]  [address]  [place] [address] [phone]  Primary Care Physician: Jayson Hinson PA-C    Referring Diagnosis / Date of Diagnosis  STEMI 5/22/25  STENT 5/22/25      AACVPR Risk Stratification:  High    Learning Assessment:  Cardiac Knowledge Test:       Pre: Given at eval/15                           Post:        /15  Learning assessment/barriers: Transportation, chronic pain - spouse will be driving pt  Preferred learning method: Auditory, Visual, Reading handout, and Writing handout      Education Classes: Schedule given with education manual    Psychosocial Initial Assessment    Psychosocial Assessment  Pt reported/currently experiencing stress : Yes, Stress and Depression. No dx of depression but low mood and tearful at intake  Current Stress Level (1-10 scale):  4/10  History of anxiety/depression: No  Currently seeing a mental health provider: No   Psychosocial Meds: No  Medication changes: No    Social Support: Yes, Whom:spouse, Joselito and adult children     Psychosocial Test:  PHQ-9  PHQ-9 score:   Pre:  given at eval  Mid:     Post:     Interpretation:    Needs to complete     Psychosocial Plan   Goal Status:   In progress   Psychosocial Goals:  Attend Art Therapy, Attend Chair Yoga, Improve stress level, Participate in music therapy cooldown   Psychosocial Interventions/Education:  Family support, Staff and patient discussion      Nutrition Initial Assessment    Nutrition Assessment  Picture Your Plate Score:  Pre: given at eval  Post:  Picture Your Plate Interpretation:   given at evaluation to complete   Special Diet:   none   Dietary  "Goal:  Mediterranean Diet;  PYP >60     Weight Management:   Weight:  160 lb   Height:  61 \"  BMI:  30  Weighing Daily: no - has home scale, given daily wt calendar and CHF zone chart  Goal Weight:   n/a    Heart Failure Assessment:   Most recent EF: 35%  Date: 5/22/25  Hx of Heart Failure: No    Heart Failure medications: Yes - Lasix    Heart Failure medication changes: No     Comments: pt plans to hold Lasix prior to exercise session    Nutrition Plan   Goal Status: In progress  Nutrition Goals:   BMI <30, develop heart healthy diet, daily weights, verbalize understanding of s/sx CHF    RD Recommendations:        Nutrition Interventions/Education: complete PYP diet survey, CHF zone chart and daily wt log (date given 6/9/25)      Exercise Initial Assessment    Exercise Assessment    Fall Risk Results:  medium  Initial Intake: Starting MET Level 2.4   Current MET level 2.4    Discharge MET level n/a    6-Min Walk Test:     PRE:     Feet:    990       METS:  2.4                 POST:  Feet:    n/a       METS:  n/a                    Current Home Exercise:   PT exercises only  Mode: n/a  Frequency (days/week):   n/a  Duration(mins/day):    n/a    Exercise Plan  Exercise Prescription based on 6MWT and/or evaluation   Frequency:   2-3   days/week   Duration:   40  minutes    Intensity: RPE (0-10 Dilma Scale):  2-4 (light to somewhat hard)  Target HR: rest + 30 bpm  Intensity: rest + 30  SpO2 Range   >88%, interval training prn    O2 Flow Rate  N/A   Progression:   Aim to progress 0.2- 0.3 METs/week or as tolerated  Mode:  Aerobic exercise  Initial MET level determined by the exercise physiologist from 1:1 evaluation and/or 6MWT, physician approved as documented.     Modality METS Load    Duration   1 Pre-Exercise/Rest      20:00   2 Treadmill N/a N/a mph   N/a%   40:00   3 Sci RB 2.9 1.5 level   50-60RPM   40:00   4 NuStep 1.8 18 level   60-80SPM   40:00   5 Physiostepper 2.5 2 level   30-50SPM   40:00   6 Recumbent Bike " 2.6 1 level   50-60RPM   40:00   7 Magnum UBE N/a 0 load   40-50RPM   40:00   8 N/A      40:00   9 N/A      40:00   10 Final Cooldown      20:00     Resistance Training: no      Home Exercise Prescription/Self Report Log given: no     Goal Status: In progress  Exercise Goals: Improve 6MWT by 10%, Increase 30-40% in functional capacity overall, Safe & comfortable with exercise equipment, Uses RPE Dilma Scale (0-10) & exercises at RPE of 2-4, Increase 10-20% in functional capacity by next reassessment  Exercise Interventions/Education: Practice the RPE Dilma Scale (0-10)    Other Core Components/Risk Factors Initial Assessment    Other Core Components Assessment  MEDICATION Adherence:              Taking medications as prescribed: yes   Uses pill box/organizer: need to assess   Carries medication list: yes     HYPERTENSION  Management:  Hx of Hypertension: Yes   Resting BP: 104/66  Peak Exercise BP:   124/60  Current Medications: Yes - metoprolol    Hypertension medication changes: No     TOBACCO/SMOKING Cessation:  Tobacco Use:    YES, cigarettes one half pack per day  Date started:   1985  Date quit:    Quit Date set:   Medications:   Comment:  reduced from 2PPD- pt not ready to quit    DIABETES Management:  Diabetes:  Yes borderline  Medication: No  Medication changes: N/A  Hemoglobin A1C:   Lab Results   Component Value Date    HGBA1C 5.8 (H) 05/23/2025                 Pt monitors BS at home:  No  Comment:  pre-diabetic, needs education    HYPERLIPIDEMIA Management:      Lipids: see lab values below   Medications:  Yes - atorvastatin  Medication changes:   No  Lab Results   Component Value Date    CHOL 122 05/23/2025    CHOL 161 10/26/2021     Lab Results   Component Value Date    HDL 32.7 05/23/2025    HDL 42 (L) 10/26/2021     Lab Results   Component Value Date    LDLCALC 48 05/23/2025    LDLCALC 83 10/26/2021     Lab Results   Component Value Date    TRIG 205 (H) 05/23/2025    TRIG 182 (H) 10/26/2021  "          Other Core Components Plan  Goal Status: In progress  Other Core Component Goals:   Achieve & maintain a resting blood pressure less than 130/80  Gain knowledge of cardiac disease and lifestyle modifications by discharge  Compliant with medications & carries medication list  Cholesterol <180, HDL >45, LDL <70, Trig <150  Lifestyle modification for risk factor reduction, Tobacco free    Other Core Component Interventions/Education:   Staff:Patient Discussion    Individual Patient Goals:  Increase strength & endurance  Aerobic exercise 3-6x per week  \"Live a little bit longer\", be able to attend family functions    General Comments:  Education class schedule given with manual by 1st session  Lecture handouts provided  Individual education & counseling    Rehab Staff Signature: Savi Rodríguez RN       "

## 2025-06-10 ENCOUNTER — APPOINTMENT (OUTPATIENT)
Dept: PRIMARY CARE | Facility: CLINIC | Age: 65
End: 2025-06-10
Payer: MEDICARE

## 2025-06-11 ENCOUNTER — CLINICAL SUPPORT (OUTPATIENT)
Dept: CARDIAC REHAB | Facility: CLINIC | Age: 65
End: 2025-06-11
Payer: MEDICARE

## 2025-06-11 DIAGNOSIS — Z95.5 STENTED CORONARY ARTERY: ICD-10-CM

## 2025-06-11 DIAGNOSIS — I21.02 ST ELEVATION MYOCARDIAL INFARCTION INVOLVING LEFT ANTERIOR DESCENDING CORONARY ARTERY (MULTI): ICD-10-CM

## 2025-06-11 PROCEDURE — 93798 PHYS/QHP OP CAR RHAB W/ECG: CPT | Performed by: INTERNAL MEDICINE

## 2025-06-13 ENCOUNTER — CLINICAL SUPPORT (OUTPATIENT)
Dept: CARDIAC REHAB | Facility: CLINIC | Age: 65
End: 2025-06-13
Payer: MEDICARE

## 2025-06-13 DIAGNOSIS — I21.02 ST ELEVATION MYOCARDIAL INFARCTION INVOLVING LEFT ANTERIOR DESCENDING CORONARY ARTERY (MULTI): ICD-10-CM

## 2025-06-13 DIAGNOSIS — Z95.5 STENTED CORONARY ARTERY: ICD-10-CM

## 2025-06-13 PROCEDURE — 93798 PHYS/QHP OP CAR RHAB W/ECG: CPT | Performed by: INTERNAL MEDICINE

## 2025-06-16 ENCOUNTER — CLINICAL SUPPORT (OUTPATIENT)
Dept: CARDIAC REHAB | Facility: CLINIC | Age: 65
End: 2025-06-16
Payer: MEDICARE

## 2025-06-16 DIAGNOSIS — Z95.5 STENTED CORONARY ARTERY: ICD-10-CM

## 2025-06-16 DIAGNOSIS — I21.02 ST ELEVATION MYOCARDIAL INFARCTION INVOLVING LEFT ANTERIOR DESCENDING CORONARY ARTERY (MULTI): ICD-10-CM

## 2025-06-16 PROCEDURE — 93798 PHYS/QHP OP CAR RHAB W/ECG: CPT | Performed by: INTERNAL MEDICINE

## 2025-06-17 ENCOUNTER — OFFICE VISIT (OUTPATIENT)
Dept: PRIMARY CARE | Facility: CLINIC | Age: 65
End: 2025-06-17
Payer: MEDICARE

## 2025-06-17 ENCOUNTER — HOSPITAL ENCOUNTER (INPATIENT)
Facility: HOSPITAL | Age: 65
LOS: 1 days | Discharge: HOME | DRG: 280 | End: 2025-06-18
Attending: STUDENT IN AN ORGANIZED HEALTH CARE EDUCATION/TRAINING PROGRAM | Admitting: INTERNAL MEDICINE
Payer: MEDICARE

## 2025-06-17 ENCOUNTER — APPOINTMENT (OUTPATIENT)
Dept: CARDIOLOGY | Facility: HOSPITAL | Age: 65
DRG: 280 | End: 2025-06-17
Payer: MEDICARE

## 2025-06-17 ENCOUNTER — APPOINTMENT (OUTPATIENT)
Dept: RADIOLOGY | Facility: HOSPITAL | Age: 65
DRG: 280 | End: 2025-06-17
Payer: MEDICARE

## 2025-06-17 VITALS
HEART RATE: 91 BPM | DIASTOLIC BLOOD PRESSURE: 78 MMHG | SYSTOLIC BLOOD PRESSURE: 132 MMHG | OXYGEN SATURATION: 95 % | TEMPERATURE: 97.8 F | BODY MASS INDEX: 31.37 KG/M2 | WEIGHT: 166 LBS

## 2025-06-17 DIAGNOSIS — M79.89 LEG SWELLING: ICD-10-CM

## 2025-06-17 DIAGNOSIS — G89.29 CHRONIC LEFT-SIDED LOW BACK PAIN WITH LEFT-SIDED SCIATICA: ICD-10-CM

## 2025-06-17 DIAGNOSIS — E78.2 MIXED HYPERLIPIDEMIA: ICD-10-CM

## 2025-06-17 DIAGNOSIS — R73.03 PREDIABETES: ICD-10-CM

## 2025-06-17 DIAGNOSIS — M54.42 CHRONIC LEFT-SIDED LOW BACK PAIN WITH LEFT-SIDED SCIATICA: ICD-10-CM

## 2025-06-17 DIAGNOSIS — R60.0 BILATERAL LEG EDEMA: ICD-10-CM

## 2025-06-17 DIAGNOSIS — Z71.85 IMMUNIZATION COUNSELING: ICD-10-CM

## 2025-06-17 DIAGNOSIS — M79.89 LEG SWELLING: Primary | ICD-10-CM

## 2025-06-17 DIAGNOSIS — Z85.3 HISTORY OF BREAST CANCER: ICD-10-CM

## 2025-06-17 DIAGNOSIS — I50.9 ACUTE ON CHRONIC HEART FAILURE, UNSPECIFIED HEART FAILURE TYPE: ICD-10-CM

## 2025-06-17 DIAGNOSIS — Z12.31 BREAST CANCER SCREENING BY MAMMOGRAM: ICD-10-CM

## 2025-06-17 DIAGNOSIS — Z12.31 ENCOUNTER FOR SCREENING MAMMOGRAM FOR MALIGNANT NEOPLASM OF BREAST: Primary | ICD-10-CM

## 2025-06-17 DIAGNOSIS — J96.01 ACUTE HYPOXEMIC RESPIRATORY FAILURE: ICD-10-CM

## 2025-06-17 DIAGNOSIS — S90.31XA: ICD-10-CM

## 2025-06-17 LAB
ALBUMIN SERPL BCP-MCNC: 4.2 G/DL (ref 3.4–5)
ALP SERPL-CCNC: 67 U/L (ref 33–136)
ALT SERPL W P-5'-P-CCNC: 10 U/L (ref 7–45)
ANION GAP SERPL CALCULATED.3IONS-SCNC: 13 MMOL/L (ref 10–20)
APPEARANCE UR: CLEAR
AST SERPL W P-5'-P-CCNC: 12 U/L (ref 9–39)
BASOPHILS # BLD AUTO: 0.04 X10*3/UL (ref 0–0.1)
BASOPHILS NFR BLD AUTO: 0.4 %
BILIRUB SERPL-MCNC: 0.4 MG/DL (ref 0–1.2)
BILIRUB UR STRIP.AUTO-MCNC: NEGATIVE MG/DL
BNP SERPL-MCNC: 616 PG/ML (ref 0–99)
BUN SERPL-MCNC: 13 MG/DL (ref 6–23)
CALCIUM SERPL-MCNC: 9.5 MG/DL (ref 8.6–10.3)
CARDIAC TROPONIN I PNL SERPL HS: 29 NG/L (ref 0–13)
CARDIAC TROPONIN I PNL SERPL HS: 29 NG/L (ref 0–13)
CHLORIDE SERPL-SCNC: 101 MMOL/L (ref 98–107)
CO2 SERPL-SCNC: 30 MMOL/L (ref 21–32)
COLOR UR: YELLOW
CREAT SERPL-MCNC: 0.79 MG/DL (ref 0.5–1.05)
EGFRCR SERPLBLD CKD-EPI 2021: 83 ML/MIN/1.73M*2
EOSINOPHIL # BLD AUTO: 0.13 X10*3/UL (ref 0–0.7)
EOSINOPHIL NFR BLD AUTO: 1.4 %
ERYTHROCYTE [DISTWIDTH] IN BLOOD BY AUTOMATED COUNT: 12.7 % (ref 11.5–14.5)
GLUCOSE SERPL-MCNC: 101 MG/DL (ref 74–99)
GLUCOSE UR STRIP.AUTO-MCNC: NORMAL MG/DL
HCT VFR BLD AUTO: 39.6 % (ref 36–46)
HGB BLD-MCNC: 13.2 G/DL (ref 12–16)
HOLD SPECIMEN: NORMAL
IMM GRANULOCYTES # BLD AUTO: 0.04 X10*3/UL (ref 0–0.7)
IMM GRANULOCYTES NFR BLD AUTO: 0.4 % (ref 0–0.9)
KETONES UR STRIP.AUTO-MCNC: NEGATIVE MG/DL
LEUKOCYTE ESTERASE UR QL STRIP.AUTO: NEGATIVE
LYMPHOCYTES # BLD AUTO: 1.82 X10*3/UL (ref 1.2–4.8)
LYMPHOCYTES NFR BLD AUTO: 19.2 %
MAGNESIUM SERPL-MCNC: 1.94 MG/DL (ref 1.6–2.4)
MCH RBC QN AUTO: 32.5 PG (ref 26–34)
MCHC RBC AUTO-ENTMCNC: 33.3 G/DL (ref 32–36)
MCV RBC AUTO: 98 FL (ref 80–100)
MONOCYTES # BLD AUTO: 0.68 X10*3/UL (ref 0.1–1)
MONOCYTES NFR BLD AUTO: 7.2 %
NEUTROPHILS # BLD AUTO: 6.76 X10*3/UL (ref 1.2–7.7)
NEUTROPHILS NFR BLD AUTO: 71.4 %
NITRITE UR QL STRIP.AUTO: NEGATIVE
NRBC BLD-RTO: 0 /100 WBCS (ref 0–0)
PH UR STRIP.AUTO: 5.5 [PH]
PLATELET # BLD AUTO: 183 X10*3/UL (ref 150–450)
POTASSIUM SERPL-SCNC: 3.7 MMOL/L (ref 3.5–5.3)
PROT SERPL-MCNC: 7.4 G/DL (ref 6.4–8.2)
PROT UR STRIP.AUTO-MCNC: NEGATIVE MG/DL
RBC # BLD AUTO: 4.06 X10*6/UL (ref 4–5.2)
RBC # UR STRIP.AUTO: NEGATIVE MG/DL
SODIUM SERPL-SCNC: 140 MMOL/L (ref 136–145)
SP GR UR STRIP.AUTO: 1.02
UROBILINOGEN UR STRIP.AUTO-MCNC: NORMAL MG/DL
WBC # BLD AUTO: 9.5 X10*3/UL (ref 4.4–11.3)

## 2025-06-17 PROCEDURE — 84484 ASSAY OF TROPONIN QUANT: CPT | Performed by: PHYSICIAN ASSISTANT

## 2025-06-17 PROCEDURE — 80053 COMPREHEN METABOLIC PANEL: CPT | Performed by: PHYSICIAN ASSISTANT

## 2025-06-17 PROCEDURE — 73610 X-RAY EXAM OF ANKLE: CPT | Mod: RIGHT SIDE | Performed by: RADIOLOGY

## 2025-06-17 PROCEDURE — G0378 HOSPITAL OBSERVATION PER HR: HCPCS

## 2025-06-17 PROCEDURE — 73610 X-RAY EXAM OF ANKLE: CPT | Mod: RT

## 2025-06-17 PROCEDURE — 83735 ASSAY OF MAGNESIUM: CPT | Performed by: PHYSICIAN ASSISTANT

## 2025-06-17 PROCEDURE — 2500000004 HC RX 250 GENERAL PHARMACY W/ HCPCS (ALT 636 FOR OP/ED): Performed by: STUDENT IN AN ORGANIZED HEALTH CARE EDUCATION/TRAINING PROGRAM

## 2025-06-17 PROCEDURE — G2211 COMPLEX E/M VISIT ADD ON: HCPCS | Performed by: FAMILY MEDICINE

## 2025-06-17 PROCEDURE — 1200000002 HC GENERAL ROOM WITH TELEMETRY DAILY

## 2025-06-17 PROCEDURE — 4004F PT TOBACCO SCREEN RCVD TLK: CPT | Performed by: FAMILY MEDICINE

## 2025-06-17 PROCEDURE — 99222 1ST HOSP IP/OBS MODERATE 55: CPT

## 2025-06-17 PROCEDURE — 36415 COLL VENOUS BLD VENIPUNCTURE: CPT | Performed by: PHYSICIAN ASSISTANT

## 2025-06-17 PROCEDURE — 99214 OFFICE O/P EST MOD 30 MIN: CPT | Performed by: FAMILY MEDICINE

## 2025-06-17 PROCEDURE — 81003 URINALYSIS AUTO W/O SCOPE: CPT | Performed by: PHYSICIAN ASSISTANT

## 2025-06-17 PROCEDURE — 73630 X-RAY EXAM OF FOOT: CPT | Mod: RT

## 2025-06-17 PROCEDURE — 1125F AMNT PAIN NOTED PAIN PRSNT: CPT | Performed by: FAMILY MEDICINE

## 2025-06-17 PROCEDURE — 71045 X-RAY EXAM CHEST 1 VIEW: CPT

## 2025-06-17 PROCEDURE — 2500000002 HC RX 250 W HCPCS SELF ADMINISTERED DRUGS (ALT 637 FOR MEDICARE OP, ALT 636 FOR OP/ED): Performed by: INTERNAL MEDICINE

## 2025-06-17 PROCEDURE — 83880 ASSAY OF NATRIURETIC PEPTIDE: CPT | Performed by: PHYSICIAN ASSISTANT

## 2025-06-17 PROCEDURE — 73630 X-RAY EXAM OF FOOT: CPT | Mod: RIGHT SIDE | Performed by: RADIOLOGY

## 2025-06-17 PROCEDURE — 85025 COMPLETE CBC W/AUTO DIFF WBC: CPT | Performed by: PHYSICIAN ASSISTANT

## 2025-06-17 PROCEDURE — 99291 CRITICAL CARE FIRST HOUR: CPT | Performed by: STUDENT IN AN ORGANIZED HEALTH CARE EDUCATION/TRAINING PROGRAM

## 2025-06-17 PROCEDURE — 1111F DSCHRG MED/CURRENT MED MERGE: CPT | Performed by: FAMILY MEDICINE

## 2025-06-17 PROCEDURE — 93970 EXTREMITY STUDY: CPT | Performed by: RADIOLOGY

## 2025-06-17 PROCEDURE — 1160F RVW MEDS BY RX/DR IN RCRD: CPT | Performed by: FAMILY MEDICINE

## 2025-06-17 PROCEDURE — 93005 ELECTROCARDIOGRAM TRACING: CPT

## 2025-06-17 PROCEDURE — 93970 EXTREMITY STUDY: CPT

## 2025-06-17 PROCEDURE — 2500000001 HC RX 250 WO HCPCS SELF ADMINISTERED DRUGS (ALT 637 FOR MEDICARE OP): Performed by: INTERNAL MEDICINE

## 2025-06-17 PROCEDURE — 1159F MED LIST DOCD IN RCRD: CPT | Performed by: FAMILY MEDICINE

## 2025-06-17 PROCEDURE — 99223 1ST HOSP IP/OBS HIGH 75: CPT | Performed by: INTERNAL MEDICINE

## 2025-06-17 PROCEDURE — 71045 X-RAY EXAM CHEST 1 VIEW: CPT | Performed by: RADIOLOGY

## 2025-06-17 RX ORDER — FOLIC ACID 1 MG/1
1 TABLET ORAL DAILY
COMMUNITY

## 2025-06-17 RX ORDER — ASPIRIN 81 MG/1
81 TABLET ORAL DAILY
Status: DISCONTINUED | OUTPATIENT
Start: 2025-06-17 | End: 2025-06-18 | Stop reason: HOSPADM

## 2025-06-17 RX ORDER — ACETAMINOPHEN 500 MG
1000 TABLET ORAL EVERY 6 HOURS PRN
Status: DISCONTINUED | OUTPATIENT
Start: 2025-06-17 | End: 2025-06-18 | Stop reason: HOSPADM

## 2025-06-17 RX ORDER — LANOLIN ALCOHOL/MO/W.PET/CERES
1000 CREAM (GRAM) TOPICAL DAILY
Status: DISCONTINUED | OUTPATIENT
Start: 2025-06-18 | End: 2025-06-18 | Stop reason: HOSPADM

## 2025-06-17 RX ORDER — TRAZODONE HYDROCHLORIDE 100 MG/1
100 TABLET ORAL NIGHTLY
Status: DISCONTINUED | OUTPATIENT
Start: 2025-06-17 | End: 2025-06-18 | Stop reason: HOSPADM

## 2025-06-17 RX ORDER — METOPROLOL SUCCINATE 50 MG/1
50 TABLET, EXTENDED RELEASE ORAL DAILY
Status: DISCONTINUED | OUTPATIENT
Start: 2025-06-17 | End: 2025-06-18 | Stop reason: HOSPADM

## 2025-06-17 RX ORDER — ATORVASTATIN CALCIUM 40 MG/1
40 TABLET, FILM COATED ORAL NIGHTLY
Status: DISCONTINUED | OUTPATIENT
Start: 2025-06-17 | End: 2025-06-18 | Stop reason: HOSPADM

## 2025-06-17 RX ORDER — FUROSEMIDE 10 MG/ML
40 INJECTION INTRAMUSCULAR; INTRAVENOUS ONCE
Status: COMPLETED | OUTPATIENT
Start: 2025-06-17 | End: 2025-06-17

## 2025-06-17 RX ORDER — LANOLIN ALCOHOL/MO/W.PET/CERES
400 CREAM (GRAM) TOPICAL DAILY
Status: DISCONTINUED | OUTPATIENT
Start: 2025-06-18 | End: 2025-06-18 | Stop reason: HOSPADM

## 2025-06-17 RX ORDER — FAMOTIDINE 20 MG/1
20 TABLET, FILM COATED ORAL 2 TIMES DAILY
Status: DISCONTINUED | OUTPATIENT
Start: 2025-06-17 | End: 2025-06-18 | Stop reason: HOSPADM

## 2025-06-17 RX ORDER — TICAGRELOR 90 MG/1
90 TABLET, FILM COATED ORAL 2 TIMES DAILY
Status: DISCONTINUED | OUTPATIENT
Start: 2025-06-17 | End: 2025-06-18 | Stop reason: HOSPADM

## 2025-06-17 RX ORDER — FUROSEMIDE 10 MG/ML
40 INJECTION INTRAMUSCULAR; INTRAVENOUS ONCE
Status: DISCONTINUED | OUTPATIENT
Start: 2025-06-18 | End: 2025-06-18

## 2025-06-17 RX ORDER — FOLIC ACID 1 MG/1
1 TABLET ORAL DAILY
Status: DISCONTINUED | OUTPATIENT
Start: 2025-06-18 | End: 2025-06-18 | Stop reason: HOSPADM

## 2025-06-17 RX ORDER — FUROSEMIDE 20 MG/1
20 TABLET ORAL
Status: DISCONTINUED | OUTPATIENT
Start: 2025-06-17 | End: 2025-06-18 | Stop reason: HOSPADM

## 2025-06-17 RX ADMIN — ASPIRIN 81 MG: 81 TABLET, COATED ORAL at 17:36

## 2025-06-17 RX ADMIN — FUROSEMIDE 40 MG: 10 INJECTION, SOLUTION INTRAMUSCULAR; INTRAVENOUS at 14:22

## 2025-06-17 RX ADMIN — ACETAMINOPHEN 1000 MG: 500 TABLET ORAL at 17:36

## 2025-06-17 RX ADMIN — TRAZODONE HYDROCHLORIDE 100 MG: 100 TABLET ORAL at 21:33

## 2025-06-17 RX ADMIN — ATORVASTATIN CALCIUM 40 MG: 40 TABLET, FILM COATED ORAL at 21:33

## 2025-06-17 RX ADMIN — FAMOTIDINE 20 MG: 20 TABLET, FILM COATED ORAL at 21:33

## 2025-06-17 RX ADMIN — TICAGRELOR 90 MG: 90 TABLET ORAL at 21:33

## 2025-06-17 ASSESSMENT — PAIN SCALES - GENERAL
PAINLEVEL_OUTOF10: 8
PAINLEVEL_OUTOF10: 10 - WORST POSSIBLE PAIN
PAINLEVEL_OUTOF10: 8
PAINLEVEL_OUTOF10: 8

## 2025-06-17 ASSESSMENT — PAIN DESCRIPTION - ORIENTATION: ORIENTATION: LOWER

## 2025-06-17 ASSESSMENT — PAIN - FUNCTIONAL ASSESSMENT
PAIN_FUNCTIONAL_ASSESSMENT: 0-10
PAIN_FUNCTIONAL_ASSESSMENT: 0-10

## 2025-06-17 ASSESSMENT — PAIN DESCRIPTION - DESCRIPTORS: DESCRIPTORS: ACHING

## 2025-06-17 ASSESSMENT — PAIN DESCRIPTION - LOCATION: LOCATION: BACK

## 2025-06-17 NOTE — ED TRIAGE NOTES
Pt to ED from PCP for swelling in bilateral feet and legs. Pt with swelling and bruising in the right foot with pain that has no known injury. Pt denies increased SOB and chest pain. MI on 5/22 with stent placement, on thinners. Pt has been on lasix which were helping, but no longer. She went to PCP today for the bruising in the foot.

## 2025-06-17 NOTE — CONSULTS
Inpatient consult to Cardiology  Consult performed by: KATHERINE Chakraborty-CNP  Consult ordered by: Naeem Richards MD  Reason for consult: CHF        History Of Present Illness:    Julia Carrion is a 65 y.o. female presenting with lower extremity edema.  She follows with Dr. Bateman for cardiology.  She has a past medical history of non-ST elevation myocardial infarction in May of this year with percutaneous coronary intervention to the proximal LAD, ischemic cardiomyopathy, chronic systolic heart failure, current tobacco use, hyperlipidemia, chronic obstructive pulmonary disease, history of cerebrovascular accident, history of breast cancer.  The patient reports worsening lower extremity swelling over the last week.  She also noted bruising on the top of her feet which she found particularly concerning. She denies orthopnea.  She denies chest pain, pressure or palpitations.  Denies shortness of breath.  Denies syncope.  Denies nausea or vomiting. She was seen by her primary care provider today who sent her to the [facility] emergency department for further evaluation. The patient did have a bilateral lower extremity ultrasound completed in the emergency department which ruled out deep fine thrombosis.  Lab work in the emergency department feeling a sodium of 140, potassium 3.7, creatinine 0.79 and hemoglobin 13.2.  High-sensitivity when I levels elevated and flat pattern 29 and 29.  BNP elevated at 616.  Chest x-ray with small amount of left basilar atelectasis but no other acute intrathoracic abnormality.  X-ray of the right foot with generalized soft tissue swelling of the right foot and ankle without evidence of acute osseous abnormality.  X-ray of the right ankle with no acute findings.  The patient was somewhat hypoxic on a mission and was placed on nasal cannula oxygen.  She was given 1 dose of IV furosemide in the emergency department and admitted to the hospital on telemetry for further assessment and  management.    Review of Systems   Cardiovascular:  Positive for leg swelling.   Skin:         Bruising of bilateral feet   All other systems reviewed and are negative.        Last Recorded Vitals:  Vitals:    06/17/25 1200 06/17/25 1230 06/17/25 1300 06/17/25 1400   BP: 114/69 94/62 94/63    BP Location:       Patient Position:       Pulse: 80 74 71 61   Resp: (!) 22 19 19 13   Temp:       TempSrc:       SpO2: (!) 89% 94% (!) 89% 99%   Weight:       Height:           Last Labs:  CBC - 6/17/2025: 12:10 PM  9.5 13.2 183    39.6      CMP - 6/17/2025: 12:10 PM  9.5 7.4 12 --- 0.4   3.2 4.2 10 67      PTT - 5/22/2025: 10:21 AM  1.0   11.2 34.6     Troponin I, High Sensitivity   Date/Time Value Ref Range Status   06/17/2025 01:28 PM 29 (H) 0 - 13 ng/L Final   06/17/2025 12:10 PM 29 (H) 0 - 13 ng/L Final   05/22/2025 01:32 PM 49,951 (HH) 0 - 13 ng/L Final     Comment:     Previous result verified on 5/22/2025 1116 on specimen/case [id number] called with component [facility] for procedure Troponin I, High Sensitivity, Initial with value 54,983 ng/L.     BNP   Date/Time Value Ref Range Status   06/17/2025 12:10  (H) 0 - 99 pg/mL Final   05/22/2025 10:21  (H) 0 - 99 pg/mL Final     Hemoglobin A1C   Date/Time Value Ref Range Status   05/23/2025 04:48 AM 5.8 (H) See comment % Final   10/26/2021 06:22 AM 5.4 4.0 - 6.0 % Final     Comment:     Hemoglobin A1C levels are related to mean blood glucose during the   preceding 2-3 months. The relationship table below may be used as a   general guide. Each 1% increase in HGB A1C is a reflection of an   increase in mean glucose of approximately 30 mg/dl.   Reference: Diabetes Care, volume 29, supplement 1 Jan. 2006                        HGB A1C ................. Approx. Mean Glucose   _______________________________________________   6%   ...............................  120 mg/dl   7%   ...............................  150 mg/dl   8%   ...............................  180  mg/dl   9%   ...............................  210 mg/dl   10%  ...............................  240 mg/dl  Performed [facility] [address] [facility] [address]     10/10/2021 05:04 AM 5.6 4.3 - 5.6 % Final     Comment:     American Diabetes Association guidelines indicate that patients with HgbA1c in   the range 5.7-6.4% are at increased risk for development of diabetes, and   intervention by lifestyle modification may be beneficial. HgbA1c greater or   equal to 6.5% is considered diagnostic of diabetes.     LDL Calculated   Date/Time Value Ref Range Status   05/23/2025 04:48 AM 48 <=99 mg/dL Final     Comment:                                 Near   Borderline      AGE      Desirable  Optimal    High     High     Very High     0-19 Y     0 - 109     ---    110-129   >/= 130     ----    20-24 Y     0 - 119     ---    120-159   >/= 160     ----      >24 Y     0 -  99   100-129  130-159   160-189     >/=190     10/26/2021 06:22 AM 83 65 - 130 MG/DL Final     VLDL   Date/Time Value Ref Range Status   05/23/2025 04:48 AM 41 (H) 0 - 40 mg/dL Final      Last I/O:  No intake/output data recorded.    Past Cardiology Tests (Last 3 Years):  EKG:  ECG 12 lead 06/01/2025    Echo:  Transthoracic Echo Complete 05/22/2025  CONCLUSIONS:   1. The left ventricular systolic function is moderately decreased, with a visually estimated ejection fraction of 35-40%.   2. Abnormal wall motion.   3. Spectral Doppler shows an abnormal pattern of left ventricular diastolic filling.   4. There is severe hypokinesis of the mid and distal anteroseptal wall and the entire LV apex.    Ejection Fractions:  EF   Date/Time Value Ref Range Status   05/22/2025 01:31 PM 38 %      Cath:  Cardiac Catheterization Procedure 05/22/2025  CONCLUSIONS:   1. Successful percutaneous coronary convention to proximal LAD with drug-eluting stent in the setting of anterior STEMI with excellent result. Patient is fully revascularized.   2. Elevated filling  pressures.   3. Do antiplatelet therapy at least for 12 months and aggressive risk factor modification.   4. Ejection fraction 40 to 45% with severe anterior and apical wall hypokinesis secondary to hibernation.      Past Medical History:  She has a past medical history of Breast cancer, Cancer (Multi), Deficiency of other specified B group vitamins, Personal history of diseases of the blood and blood-forming organs and certain disorders involving the immune mechanism, Personal history of other diseases of the circulatory system, Personal history of other diseases of the nervous system and sense organs, Personal history of other endocrine, nutritional and metabolic disease, Personal history of transient ischemic attack (TIA), and cerebral infarction without residual deficits, Sleep apnea, and Stroke (Multi).    Past Surgical History:  She has a past surgical history that includes Other surgical history (01/04/2022); Other surgical history (01/04/2022); MR angio head wo IV contrast (10/26/2021); US guided biopsy lymph node superficial (03/28/2022); BI US guided breast localization and biopsy right (Right, 03/28/2022); BI stereotactic guided breast left localization and biopsy (Left, 03/28/2022); BI US guided breast localization right (Right, 09/26/2022); Cardiac catheterization (N/A, 05/22/2025); Cardiac catheterization (N/A, 05/22/2025); Hysterectomy; Mastectomy; Lymph node biopsy; Tubal ligation; Breast surgery; and Eye surgery.      Social History:  She reports that she has been smoking cigarettes. She started smoking about 40 years ago. She has a 60.6 pack-year smoking history. She has never been exposed to tobacco smoke. She has never used smokeless tobacco. She reports current alcohol use of about 21.0 standard drinks of alcohol per week. She reports that she does not use drugs.    Family History:  Family History[1]     Allergies:  Patient has no known allergies.    Inpatient Medications:  Scheduled  Medications[2]  PRN Medications[3]  Continuous Medications[4]  Outpatient Medications:  Current Outpatient Medications   Medication Instructions    acetaminophen (TYLENOL) 1,000 mg, oral, Every 6 hours PRN    anastrozole (ARIMIDEX) 1 mg, oral, Daily, Swallow whole with a drink of water.    aspirin 81 mg, oral, Daily    atorvastatin (LIPITOR) 40 mg, oral, Daily    cyanocobalamin (VITAMIN B-12) 1,000 mcg, oral, Daily    famotidine (PEPCID) 20 mg, oral, 2 times daily    folic acid (FOLVITE) 1 mg, oral, Daily    furosemide (LASIX) 20 mg, oral, 2 times daily (morning and late afternoon)    magnesium oxide 400 mg magnesium capsule oral    metoprolol succinate XL (TOPROL-XL) 50 mg, oral, Daily, Do not crush or chew.    multivitamin tablet 1 tablet, oral, Daily    nitroglycerin (Nitrostat) 0.4 mg SL tablet Place 1 tablet (0.4 mg) under the tongue every 5 minutes if needed for chest pain for up to 1 dose or as directed by provider. If no relief, call 911 as directed.    ticagrelor (BRILINTA) 90 mg, oral, 2 times daily    traZODone (DESYREL) 100 mg, oral, Nightly     Physical Exam  Cardiovascular:      Rate and Rhythm: Normal rate and regular rhythm.      Heart sounds: No murmur heard.     No friction rub. No gallop.   Musculoskeletal:      Right lower leg: Edema present.      Left lower leg: Edema present.   Skin:     General: Skin is warm and dry.      Capillary Refill: Capillary refill takes less than 2 seconds.   Neurological:      Mental Status: She is alert and oriented to person, place, and time.   Psychiatric:         Mood and Affect: Mood normal.         Behavior: Behavior normal.           Assessment/Plan   Acute on Chronic Heart failure with reduced ejection fraction: Ejection fraction of 35 to 40% in the setting of her non-ST elevation myocardial infarction. The patient does have a lower extremity edema, chest x-ray without evidence of pulmonary vascular congestion.  The patient is on nasal cannula oxygen, but  with a pulse oximetry of 99% believe she could be weaned off.  She received a dose of IV furosemide in the emergency department, will reevaluate in the morning.  Ischemic Cardiomyopathy: We have been unable to use standard guideline directed medical therapy in the outpatient setting giving her issues with hypotension.  Continue metoprolol succinate with parameters for blood pressure and heart rate.  Recent non-ST elevation myocardial infarction: Patient without anginal symptoms.  Continue patient on aspirin and Brilinta for dual antiplatelet therapy.  Continue atorvastatin 40 mg nightly.  No anginal type symptoms at this point.  Chronic obstructive pulmonary disease: Management per primary  Hyperlipidemia: Continue statin  History of cerebrovascular accident: Management per primary    Impression and Plan:    6/17: Patient was sent to the emergency department with complaints of lower extremity swelling as well as bruising to the tops of both feet.  She reports compliance with her oral furosemide 20 mg twice daily at home.  She did have a recent non-ST elevation myocardial infarction at the end of May with percutaneous coronary intervention to the proximal LAD.  Ejection fraction at that time was found to be about 35 to 40%.  The patient has no complaints of worsening shortness of breath or orthopnea.  No complaints of chest pain or pressure. She was given a dose of IV Lasix in the emergency department.  Chest x-ray without evidence of pulmonary vascular congestion, but her BNP is elevated.  The patient did just have an echocardiogram completed in May, no need to repeat this study this admission as she will have one prior to her next follow up with Dr. Bateman. Continue dual platelet therapy with aspirin and Brilinta.  Continue high intensity statin.  Again, we have been limited on what we can use for guideline directed therapy given the patient's issues with hypotension.  Continue metoprolol succinate 50 mg daily with  parameters for blood pressure and heart rate.  Agree with gentle IV diuresis, will reevaluate the patient in the morning.  Will follow with you.    Peripheral IV 06/17/25 20 G Right Antecubital (Active)   Site Assessment Clean;Dry;Intact 06/17/25 1215   Dressing Type Transparent 06/17/25 1215   Line Status Flushed 06/17/25 1215   Dressing Status Clean;Dry;Occlusive 06/17/25 1215   Number of days: 0       Code Status:  Full Code        DIONNE Chakraborty       [1]   Family History  Problem Relation Name Age of Onset    Cancer Sister La     Breast cancer Sister La    [2]   Scheduled medications   Medication Dose Route Frequency    [START ON 6/18/2025] furosemide  40 mg intravenous Once   [3]   PRN medications   Medication   [4]   Continuous Medications   Medication Dose Last Rate

## 2025-06-17 NOTE — ED PROVIDER NOTES
HPI   Chief Complaint   Patient presents with    Leg Swelling     Pt to ED from PCP for swelling in bilateral feet and legs. Pt with swelling and bruising in the right foot with pain that has no known injury. Pt denies increased SOB and chest pain. MI on 5/22 with stent placement, on thinners. Pt has been on lasix which were helping, but no longer. She went to PCP today for the bruising in the foot.        Patient was sent to the emergency department by PCP for bilateral lower extremity swelling.  Patient reports that the swelling has been intermittent over the last 1 year.  She states that she has noted some bruising on the legs without any obvious injuries.  She denies any shortness of breath.In the past, she was placed on Lasix with significant improvement.              Patient History   Medical History[1]  Surgical History[2]  Family History[3]  Social History[4]    Physical Exam   ED Triage Vitals [06/17/25 1141]   Temperature Heart Rate Respirations BP   36.5 °C (97.7 °F) (!) 101 18 88/71      Pulse Ox Temp Source Heart Rate Source Patient Position   97 % Temporal Monitor Sitting      BP Location FiO2 (%)     Left arm --       Physical Exam  HENT:      Head: Normocephalic.   Eyes:      General: No scleral icterus.  Cardiovascular:      Rate and Rhythm: Normal rate.   Pulmonary:      Effort: Pulmonary effort is normal.      Comments: Crackles in bases, particularly the left.  Musculoskeletal:      Comments: 1+ pretibial edema of bilateral lower extremities.   Skin:     Comments: Some bruising noted on the right calf as well as feet bilaterally   Neurological:      Mental Status: She is alert.           ED Course & MDM   ED Course as of 06/17/25 1359   Tue Jun 17, 2025   1217 EKG interpreted by me: Normal sinus rhythm, rate 78.  Borderline leftward axis.  T wave inversion in lead I and aVL.  Low voltage. [ML]      ED Course User Index  [ML] Alberto Dumont MD         Diagnoses as of 06/17/25 1359   Leg swelling  Name: PEEWEE CHRISTIANSON    [facility] IP Care Transitions (Call 2 (09D PD))     Question 1 Follow Up Transportation  Do you need transportation help to get to any of your healthcare appointments? Please press 1 for yes or press 2 for no Transportation Help (Issue Panel: [place] Clinical Intervention, Issue Alert: [place] Clinical Alert)    Call Status  Call Status: Answered (edited by AARON on 07/21/2023 12:59 PM CDT), Attempt 1 (edited by AARON on 07/21/2023 10:44 AM CDT), Attempt 2 (edited by AARON on 07/21/2023 12:59 PM CDT), Voicemail Left (edited by AARON on 07/21/2023 10:51 AM CDT)     Acute on chronic heart failure, unspecified heart failure type   Acute hypoxemic respiratory failure                 No data recorded     Sandi Coma Scale Score: 15 (06/17/25 1217 : Radha Romeo RN)                           Medical Decision Making  Patient noted to be consistently in the mid 80s percent oxygen saturation on room air.  Her x-ray does reveal some left basilar atelectasis and on examination, lung sounds do reveal crackles in this area.  Lower extremities are swollen.  BNP is elevated.  I have significant suspicion for heart failure exacerbation.  Patient is on Lasix 20 mg twice daily.  She was given 40 mg dose of IV Lasix in the emergency department and placed on supplemental oxygen.  Being on anticoagulation, my suspicion for PE is low.  Troponin x 2 is stable.  Acute coronary syndrome felt to be unlikely.  Patient accepted to medicine service for further management of acute hypoxemic respiratory failure felt to be secondary to heart failure exacerbation.  Parts of this chart were completed with dictation software, please excuse any errors in transcription.        Procedure  Critical Care    Performed by: Alberto Dumont MD  Authorized by: Alberto Dumont MD    Critical care provider statement:     Critical care time (minutes):  33    Critical care time was exclusive of:  Separately billable procedures and treating other patients    Critical care was necessary to treat or prevent imminent or life-threatening deterioration of the following conditions:  Respiratory failure and cardiac failure    Critical care was time spent personally by me on the following activities:  Development of treatment plan with patient or surrogate, evaluation of patient's response to treatment, examination of patient, obtaining history from patient or surrogate, ordering and performing treatments and interventions, ordering and review of laboratory studies, ordering and review of radiographic studies, pulse oximetry,  re-evaluation of patient's condition and review of old charts    I assumed direction of critical care for this patient from another provider in my specialty: no      Care discussed with: admitting provider             [1]   Past Medical History:  Diagnosis Date    Breast cancer     Cancer (Multi)     Deficiency of other specified B group vitamins     Folate deficiency    Personal history of diseases of the blood and blood-forming organs and certain disorders involving the immune mechanism     History of macrocytosis    Personal history of other diseases of the circulatory system     History of hypertension    Personal history of other diseases of the nervous system and sense organs     History of peripheral neuropathy    Personal history of other endocrine, nutritional and metabolic disease     History of hypokalemia    Personal history of transient ischemic attack (TIA), and cerebral infarction without residual deficits     History of stroke    Sleep apnea     Stroke (Multi)    [2]   Past Surgical History:  Procedure Laterality Date    BI STEREOTACTIC GUIDED BREAST LEFT LOCALIZATION AND BIOPSY Left 03/28/2022    BI STEREOTACTIC GUIDED BREAST LOCALIZATION AND BIOPSY LEFT [facility]    BI US GUIDED BREAST LOCALIZATION AND BIOPSY RIGHT Right 03/28/2022    BI US GUIDED BREAST LOCALIZATION AND BIOPSY RIGHT [facility]ICAL [facility]    BI US GUIDED BREAST LOCALIZATION RIGHT Right 09/26/2022    BI US GUIDED BREAST LOCALIZATION RIGHT [facility] INPATIENT [facility]GAC[facility]    BREAST SURGERY      CARDIAC CATHETERIZATION N/A 05/22/2025    Procedure: LHC, No LV;  Surgeon: Maribel Bateman MD;  Location: [facility] Cardiac Cath Lab;  Service: Cardiovascular;  Laterality: N/A;    CARDIAC CATHETERIZATION N/A 05/22/2025    Procedure: PCI;  Surgeon: Maribel Bateman MD;  Location: [facility] Cardiac Cath Lab;  Service: Cardiovascular;  Laterality: N/A;    EYE SURGERY      HYSTERECTOMY      LYMPH NODE BIOPSY      MASTECTOMY      MR HEAD ANGIO WO IV CONTRAST   10/26/2021    MR HEAD ANGIO WO IV CONTRAST [facility]    OTHER SURGICAL HISTORY  01/04/2022    Cataract surgery    OTHER SURGICAL HISTORY  01/04/2022    Hysterectomy    TUBAL LIGATION      US GUIDED BIOPSY LYMPH NODE SUPERFICIAL  03/28/2022    US GUIDED BIOPSY LYMPH NODE SUPERFICIAL [facility]   [3]   Family History  Problem Relation Name Age of Onset    Cancer Sister La     Breast cancer Sister La    [4]   Social History  Tobacco Use    Smoking status: Every Day     Current packs/day: 0.25     Average packs/day: 1.5 packs/day for 40.5 years (60.6 ttl pk-yrs)     Types: Cigarettes     Start date: 1985     Passive exposure: Never    Smokeless tobacco: Never   Vaping Use    Vaping status: Never Used   Substance Use Topics    Alcohol use: Yes     Alcohol/week: 21.0 standard drinks of alcohol     Types: 21 Cans of beer per week    Drug use: Never        Alberto Dumont MD  06/17/25 1382

## 2025-06-17 NOTE — PROGRESS NOTES
Subjective   Julia Carrion is a 65 y.o. female who presents for SICK VISIT with complaints of swelling in feet and bruised right foot, and follow up for DLD, and other health issues    HPI:      65 y.o. female SMOKER (1.5 ppd x 45 years) who presents for SICK VISIT with complaints of swelling in feet and bruised right foot, and follow up for DLD, and other health issues.       EMR/EPIC records reviewed     Last seen by me on 12/12/24 ESTABLISH PCP as a TRANSFER OF CARE FROM Dr. HINES  for a  Medicare Wellness Visit and FOLLOW UP VISIT FOR DLD, GERD, and OTHER CHRONIC CONDITIONS.  At visit:    Medicare wellness Visit and Annual Physical: completed today      Chronic lower back pain: no red flag signs/sxs  -lumbar spine XR ordered  -medrol dose pack  -lidocaine patches as needed  -referral to PT and orthopedic spine ordered  -Emergency Dept precautions discussed and reviwed with patient     hyperlipidemia  -lipid panel ordered  -cont statin  - low cholesterol diet, regularly exercise, and limit alcohol intake     History of breast cancer: followed by oncology  -cont medications and management by oncology      Diabetes Screening  -HgBA1c ordered     Vitamin D deficiency  -Vit D levels ordered     Hep C screening  -Hep C antibody ordered     STI Screening: declined HIV, syphilis screening     Cervical Cancer Screening  -GYN referral ordered for well woman visit and pap smear     Breast Cancer Screening  -mammogram ordered     Smoking dependence: precontemplative  -encouraged patient to cut back and quit smoking  -patient declined smoking cessation resources        Colon cancer Screening  -colonoscopy and cologuard ordered; patient advised to only complete 1 of these screenings     Depression Screening  Depression screening completed using the PHQ-2 questions with results documented in the chart/encounter (15min)  (See Rooming Screening section for documentation, and/or progress note for additional information)      Cardiac Risk Assessment:  Cardiovascular risk was discussed and ,if needed, lifestyle modifications recommended, including nutritional choices, exercise, and elimination of habits contributing to risk.  We agreed on a plan to reduce the current cardiovascular risk based on above discussion as needed.  Aspirin use/disuse was discussed and documented in the Problem List of the medical record (under Cardiac Risk Counseling) after reviewing the updated guidelines below:  Consider low dose Aspirin ( mg) use if the benefit for cardiovascular disease prevention outweighs risk for bleeding complications.  In general, low dose ASA should be considered:  In patients WITHOUT prior MI/stroke/PAD (primary prevention):  a.Age <60: Use if 10-year cardiovascular disease risk >20%, with discussion of risks and benefits with patient  b.Age 60-<70: Use if 10-year cardiovascular disease risk >20% and low bleeding (e.g., gastrointestinal) risk, with discussion of risks and benefits with patient  c.Age >=70: Do not use  In patients WITH prior MI/stroke/PAD (secondary prevention):  Generally use unless extremely high bleeding (e.g., gastrointestinal) risk, with discussion of risks and benefits with patient  (~16 min spent discussing above)     Advance Directives Discussion  Advanced Care Planning (including a Living Will, Healthcare POA, as well as specific end of life choices and/or directives), was discussed with the patient and/or surrogate, voluntarily, and details of that discussion documented in the Problem List (under Advanced Directives Discussion) of the medical record.  (~16 min spent discussing above)         Immunizations Counseling  -flu and TDAP vaccine now due==> declined  -recommend updated COVID spike vaccine, shingles, and RSV that can be obtained at local pharmacy     FOLLOW-UP: 4 weeks to discuss and review test results         PMHx:  HTN  Hyperlipidemia  HFrEF  STEMI; 5/22/25  CAD s/p  cardiac catheterization  showing critical 99% proximal LAD that was treated with drug-eluting stent without complications  (5/2025)  GERD  Malignant neoplasm of right breast in female, estrogen receptor positive  Deficiency of other specified B group vitamins  Personal history of diseases of the blood and blood-forming organs and certain disorders involving the immune mechanism  Personal history of other diseases of the circulatory system  Personal history of other diseases of the nervous system and sense organs  Personal history of other endocrine, nutritional and metabolic disease  Personal history of transient ischemic attack (TIA), and cerebral infarction without residual deficits  DEVYN on CPAP  S/p hysterectomy      ECHO (5/22/25)  CONCLUSIONS:   1. The left ventricular systolic function is moderately decreased, with a visually estimated ejection fraction of 35-40%.   2. Abnormal wall motion.   3. Spectral Doppler shows an abnormal pattern of left ventricular diastolic filling.   4. There is severe hypokinesis of the mid and distal anteroseptal wall and the entire LV apex.               Healthcare Providers:  Oncology: Marimar Hutson PA-C ; breast cancer  Cardiology: Dr. Bateman and Bert Alfred MD  Nephrology:   [facility] pulmonology: KATHERINE Toledo.CNP  Dermatology: Dr. Ledesma   Prior PCP: DR. Abrams        Preventive Health Services:  -Last Medicare wellness Visit: 12/12/24  -last pap smear:  no longer indicated s/p hysterectomy  -last mammogram: 2022==> NOW DUE  -last colonoscopy/cologuard: ?==> NOW DUE  -last STI screening: ?  -Hep C screening: ?        Immunizations:  -Childhood vaccines: completed per patient  -updated COVID spike vaccine: NOW DUE  -TDAP: NOW DUE  -Flu vaccine: DUE NOW  -shingles: NOW DUE  -RSV: NOW DUE  -Prevnar 20: DUE NOW          There is no immunization history on file for this patient.        INTERVAL HISTORY:    -saw cardiology for STEMI and CAD s/p PCI (5/2025), Family Medicine NP for hospital follow up,  oncology, and orthopedic  surgery spine    -admit 5/22-5/24/25 for  STEMI s/p PCI found to have SIENA. Hospital discharge summary, labs, and imaging reviewed.           Today  Julia reports:     - bilateral foot  and leg swelling x 2 days and bruising of right foot x 2 days, rated in severity as 8/10,  worsening over time. She denies fevers/chills, headache, confusion, CP, heart palpitations, dizziness, SOB, cough, orthopnea, exertional dyspnea, diaphoresis, leg or foot pain, difficulty walking, or any known history of leg or right foot trauma.     -ongoing chronic lower back pain x 1+ years, rated in severity 5-7/10, not worsening over time. She reports in the past was seen in ED and given steroids that was helpful and is requesting medrol dose pack. She denies fevers/chills, neck pain, saddle anesthesia, urinary or fecal incontinence or retention, numbness/tingling, weakness, difficulty ambulating  or history of back trauma. She reports that she uses a  rollator walker. She expressed interest in referral to PT and orthopedic spine for evaluation.     -otherwise doing and feeling well     -taking all medications as prescribed with no reported adverse medication side effects     -has scheduled appt with cardiac rehab, pulmonology, and cardiology     Today  she  has no other reported complaints, issues, or problems.  ROS is NEG for HEADACHE, NAUSEA, VOMITING, DIARRHEA, CHEST PAIN, SOB, and BLEEDING.  Review of systems (12) is negative for all systems except for any identified issues in HPI above.       Objective     /78   Pulse 91   Temp 36.6 °C (97.8 °F)   Wt 75.3 kg (166 lb)   LMP  (LMP Unknown)   SpO2 95%   BMI 31.37 kg/m²      Physical Examination:       GENERAL           General Appearance: well-appearing, well-developed, well-hydrated, well-nourished, no acute distress.        HEENT           NECK supple, no masses or thyromegaly, no carotid bruit.        EYES           Extraocular Movements: normal,  bilateral eyes TRISH, no conjunctival injection.        HEART           Rate and Rhythm regular rate and rhythm. Heart sounds: normal S1S2, no S3 or S4. Murmurs: none.        CHEST           Breath sounds: Clear to IPPA, RR<16 no use of accessory muscles.        ABDOMEN           General: Neg for LKKS or masses, no scleral icterus or jaundice.        MUSCULOSKELETAL           Joints Demonstration: Neg for erythema, swelling or joint deformities. gross abnormalities no gross abnormalities.  SPINE (cervical==> coccyx): non-tender to palpation. Somewhat limited ROM of spine due to pain. + TTP of paraspinal lumbar muscles.        EXTREMITIES           Lower Extremities: 2+ pitting edema from feet to knees. No cyanosis, no clubbing, warm well perfused.  RIGHT FOOT: + bruising on dorsum of foot near 3rd and 4th toes, non-tender to palpation.       Assessment/Plan   Problem List Items Addressed This Visit       Hyperlipidemia    Bilateral leg edema    Prediabetes     Other Visit Diagnoses         Encounter for screening mammogram for malignant neoplasm of breast    -  Primary      Leg swelling          Contusion of sole of foot, right, initial encounter          History of breast cancer          Immunization counseling          Breast cancer screening by mammogram              LE swelling, right foot pain and bruising: Lung exam WNL.  R/o DVT and acute on chronic systolic heart failure. Patient is hemodynamically stable.  -patient advised to go to [facility] emergency room to rule out acute on chronic heart failure and DVT.   Patient is safe to drive herself to ED  -report called to Franky at [facility] Emergency Room  -will defer ordering bilateral duplex venous, XR right foot, and labs to [facility] ED  --emergency Dept precautions discussed ad reviewed with patient    CAD, STEMI s/p PCI, and HFrEF: followed by cardiology  -cont medications and management by cardiology  -911/EMS cardiac precautions discussed and reviewed with  patient    Hyperlipidemia  -cont statin  - low cholesterol diet, regularly exercise, and limit alcohol intake      Chronic lower back pain: no red flag signs/sxs, followed by orthopedic surgery spine  -cont management by orthopedic surgery spine  -Emergency Dept precautions discussed and reviwed with patient        History of breast cancer: followed by oncology  -cont medications and management by oncology      Smoking dependence: precontemplative  -encouraged patient to cut back and quit smoking  -patient declined smoking cessation resources        Immunizations Counseling  -DAP vaccine now due==> declined  -recommend updated COVID spike vaccine, shingles, TDAP, and RSV that can be obtained at local pharmacy     FOLLOW-UP: 4 weeks after ED/hospital discharge     I have personally reviewed all available pertinent labs, imaging, and consult notes with the patient.      Discussed recommended plan of care with patient. Patient expressed understanding and agreement with plan of care. All of patient's questions were answered at the time. Patient had no additional questions at the time.            India Johnston MD, PhD

## 2025-06-17 NOTE — H&P
History Of Present Illness  Julia Carrion is a 65 y.o. female presenting with worsening leg edema..  She did have an MI a few months ago and at that time had a EF of 35%.  She has been on metoprolol and low-dose furosemide.  Has also been on aspirin and Brilinta.  Her legs have gotten more swollen.  Her shoes are got more tight.  She just started cardiac rehab and then she noticed some ecchymosis on the dorsum of her right foot which she found very alarming.  She does not recall any trauma or particular incident that would explain this.  She is not particularly short of breath.  Has been given 40 mg IV Lasix in the ER..     Past Medical History  She has a past medical history of Breast cancer, Cancer (Multi), Deficiency of other specified B group vitamins, Personal history of diseases of the blood and blood-forming organs and certain disorders involving the immune mechanism, Personal history of other diseases of the circulatory system, Personal history of other diseases of the nervous system and sense organs, Personal history of other endocrine, nutritional and metabolic disease, Personal history of transient ischemic attack (TIA), and cerebral infarction without residual deficits, Sleep apnea, and Stroke (Multi).    Surgical History  She has a past surgical history that includes Other surgical history (01/04/2022); Other surgical history (01/04/2022); MR angio head wo IV contrast (10/26/2021); US guided biopsy lymph node superficial (03/28/2022); BI US guided breast localization and biopsy right (Right, 03/28/2022); BI stereotactic guided breast left localization and biopsy (Left, 03/28/2022); BI US guided breast localization right (Right, 09/26/2022); Cardiac catheterization (N/A, 05/22/2025); Cardiac catheterization (N/A, 05/22/2025); Hysterectomy; Mastectomy; Lymph node biopsy; Tubal ligation; Breast surgery; and Eye surgery.     Social History  She reports that she has been smoking cigarettes. She started  smoking about 40 years ago. She has a 60.6 pack-year smoking history. She has never been exposed to tobacco smoke. She has never used smokeless tobacco. She reports current alcohol use of about 21.0 standard drinks of alcohol per week. She reports that she does not use drugs.    Family History  Family History[1] Negative for heart disease.     Allergies  Patient has no known allergies.    Review of Systems HPI for pertinent positives and negatives otherwise 10 point review of systems negative     Physical Exam alert oriented undistressed  Head atraumatic normocephalic  Pupils equal round reactive to light extraocular motion intact  Mouth normal-appearing tongue and oropharynx  Neck supple without thyromegaly  Lymph nodes no cervical or axillary lymphadenopathy  Heart regular rate and rhythm without murmurs rubs or gallops  Lungs clear to auscultation without wheezing  Abdomen soft nontender nondistended  Extremities 2-3+ pitting bilateral leg edema there is some ecchymoses over her right MTP joints on the dorsal surface.  Skin see above  Neuro cranial nerves intact with good tone strength arms and legs  Musculoskeletal normal passive range of motion shoulders elbows hips and knees.     Last Recorded Vitals  BP 94/63   Pulse 61   Temp 36.5 °C (97.7 °F) (Temporal)   Resp 13   Wt 74.8 kg (165 lb)   SpO2 99%     Relevant Results  I reviewed the chest x-ray done today myself.  It is read as clear but I could possibly see some interstitial edema consistent with CHF bilateral diffuse.  As mentioned above an echocardiogram from May 2025 shows EF of 35%.  Assessment/Plan   Assessment & Plan  Leg swelling    Acute on chronic systolic heart failure.  She has not completely decompensated but the edema is starting to affect the way her shoes fit and I think is a major contributor to the bruising on her foot.  Will try to aggressively diurese her a bit.  She is not on any afterload reduction and she is on a pretty high dose  of metoprolol.  Perhaps this needs to be optimized.  Will keep her on telemetry.  Redose IV Lasix tomorrow morning.  Consult cardiology.  Continue metoprolol aspirin and Brilinta for now.    All the above requires inpatient hospitalization.  That is my conclusion I have written the order as such         Naeem Richards MD         [1]   Family History  Problem Relation Name Age of Onset    Cancer Sister La     Breast cancer Sister La

## 2025-06-17 NOTE — PROGRESS NOTES
Pharmacy Medication History Review    Julia Carrion is a 65 y.o. female admitted for Leg swelling. Pharmacy reviewed the patient's [id number] medications and allergies for accuracy.    Medications ADDED:  None   Medications CHANGED:  none  Medications REMOVED:   None      The list below reflects the updated PTA list. Comments regarding how patient may be taking medications differently can be found in the Admit Orders Activity  Prior to Admission Medications   Prescriptions Last Dose Informant   acetaminophen (Tylenol) 500 mg tablet 6/17/2025 self   Sig: Take 2 tablets (1,000 mg) by mouth every 6 hours if needed for mild pain (1 - 3) for up to 10 days.   anastrozole (Arimidex) 1 mg tablet 6/16/2025 self   Sig: Take 1 tablet (1 mg total) by mouth once daily.  Swallow whole with a drink of water.   aspirin 81 mg EC tablet 6/16/2025 self   Sig: Take 1 tablet (81 mg) by mouth once daily.   atorvastatin (Lipitor) 40 mg tablet 6/16/2025 self   Sig: Take 1 tablet (40 mg) by mouth once daily.   cyanocobalamin (Vitamin B-12) 1,000 mcg tablet 6/16/2025 self   Sig: Take 1 tablet (1,000 mcg) by mouth once daily.   famotidine (Pepcid) 20 mg tablet 6/16/2025 self   Sig: Take 1 tablet (20 mg) by mouth 2 times a day.   folic acid (Folvite) 1 mg tablet 6/16/2025 self   Sig: Take 1 tablet (1 mg) by mouth once daily.   furosemide (Lasix) 20 mg tablet 6/17/2025 self   Sig: Take 1 tablet (20 mg) by mouth 2 times daily (morning and late afternoon).   magnesium oxide 400 mg magnesium capsule 6/16/2025 self   Sig: Take by mouth.   metoprolol succinate XL (Toprol-XL) 50 mg 24 hr tablet 6/16/2025 self   Sig: Take 1 tablet (50 mg) by mouth once daily. Do not crush or chew.   multivitamin tablet 6/16/2025 self   Sig: Take 1 tablet by mouth once daily.   nitroglycerin (Nitrostat) 0.4 mg SL tablet Unknown self   Sig: Place 1 tablet (0.4 mg) under the tongue every 5 minutes if needed for chest pain for up to 1 dose or as directed by  provider. If no relief, call 911 as directed.   ticagrelor (Brilinta) 90 mg tablet 6/17/2025 self   Sig: Take 1 tablet (90 mg) by mouth 2 times a day.   traZODone (Desyrel) 100 mg tablet 6/16/2025 self   Sig: Take 1 tablet (100 mg) by mouth once daily at bedtime.      Facility-Administered Medications: None        The list below reflects the updated allergy list. Please review each documented allergy for additional clarification and justification.  Allergies  Reviewed by Meka Chavis CPhT on 6/17/2025   No Known Allergies         Pharmacy has been updated to [facility] [employer].    Sources used to complete the med history include dispense history, PTA medication list, patient interview. Patient is a good historian.    Below are additional concerns with the patient's PTA list.  None     Meka Chavis CPhT-Adv  Please reach out via [employer] Secure Chat for questions

## 2025-06-17 NOTE — ED TRIAGE NOTES
Triage Note:  Date of Encounter: [unfilled]   MRN: [id number]    I saw the patient as the clinician in triage and performed a brief history and physical exam established acuity and order appropriate test to develop basic plan of care.  Patient will be seen by CARRILLO and/or the physician who will independently evaluate  The patient.  Please see subsequent provider notes for further details and disposition      Brief HPI:   In brief, 65-year-old female coming in for lower extremity swelling.  On a water pill, patient does not appear in acute distress, currently resting comfortably.  Sent from PCP, has some bruising to the right foot and some pain and increased swelling in the right lower extremity extending up into the calf, sent over for ultrasound.  PCP also indicated concern for possible CHF    Focused physical exam:  2+ edema bilateral lower extremity little bit of bruising to the dorsal right foot.  Good distal sensation and pulses.    Plan/MDM:  X-ray, ultrasound, labs, chest x-ray, ekg        Please see subsequent provider note for details and disposition

## 2025-06-17 NOTE — CARE PLAN
15:53  Pt went to surgery for a fx hip; list of SNF given to pts  Florentin and her son to review. Requested that they review the list and choose 4 facilities from the list.  Care Coordination to assess pt on Wednesday

## 2025-06-18 ENCOUNTER — APPOINTMENT (OUTPATIENT)
Dept: CARDIAC REHAB | Facility: CLINIC | Age: 65
DRG: 280 | End: 2025-06-18
Payer: MEDICARE

## 2025-06-18 ENCOUNTER — PHARMACY VISIT (OUTPATIENT)
Dept: PHARMACY | Facility: CLINIC | Age: 65
End: 2025-06-18
Payer: MEDICARE

## 2025-06-18 VITALS
RESPIRATION RATE: 16 BRPM | SYSTOLIC BLOOD PRESSURE: 111 MMHG | DIASTOLIC BLOOD PRESSURE: 69 MMHG | OXYGEN SATURATION: 94 % | WEIGHT: 165 LBS | BODY MASS INDEX: 31.15 KG/M2 | HEART RATE: 75 BPM | TEMPERATURE: 98.1 F | HEIGHT: 61 IN

## 2025-06-18 LAB
ANION GAP SERPL CALCULATED.3IONS-SCNC: 13 MMOL/L (ref 10–20)
BUN SERPL-MCNC: 16 MG/DL (ref 6–23)
CALCIUM SERPL-MCNC: 8.9 MG/DL (ref 8.6–10.3)
CHLORIDE SERPL-SCNC: 100 MMOL/L (ref 98–107)
CO2 SERPL-SCNC: 31 MMOL/L (ref 21–32)
CREAT SERPL-MCNC: 0.81 MG/DL (ref 0.5–1.05)
EGFRCR SERPLBLD CKD-EPI 2021: 81 ML/MIN/1.73M*2
GLUCOSE SERPL-MCNC: 110 MG/DL (ref 74–99)
POTASSIUM SERPL-SCNC: 3.5 MMOL/L (ref 3.5–5.3)
SODIUM SERPL-SCNC: 140 MMOL/L (ref 136–145)

## 2025-06-18 PROCEDURE — 80048 BASIC METABOLIC PNL TOTAL CA: CPT | Performed by: INTERNAL MEDICINE

## 2025-06-18 PROCEDURE — 99238 HOSP IP/OBS DSCHRG MGMT 30/<: CPT | Performed by: INTERNAL MEDICINE

## 2025-06-18 PROCEDURE — 2500000001 HC RX 250 WO HCPCS SELF ADMINISTERED DRUGS (ALT 637 FOR MEDICARE OP): Performed by: INTERNAL MEDICINE

## 2025-06-18 PROCEDURE — 2500000002 HC RX 250 W HCPCS SELF ADMINISTERED DRUGS (ALT 637 FOR MEDICARE OP, ALT 636 FOR OP/ED): Performed by: INTERNAL MEDICINE

## 2025-06-18 PROCEDURE — 36415 COLL VENOUS BLD VENIPUNCTURE: CPT | Performed by: INTERNAL MEDICINE

## 2025-06-18 PROCEDURE — RXMED WILLOW AMBULATORY MEDICATION CHARGE

## 2025-06-18 PROCEDURE — 2500000004 HC RX 250 GENERAL PHARMACY W/ HCPCS (ALT 636 FOR OP/ED): Performed by: INTERNAL MEDICINE

## 2025-06-18 PROCEDURE — G0378 HOSPITAL OBSERVATION PER HR: HCPCS

## 2025-06-18 PROCEDURE — 99232 SBSQ HOSP IP/OBS MODERATE 35: CPT

## 2025-06-18 RX ORDER — POTASSIUM CHLORIDE 750 MG/1
10 TABLET, FILM COATED, EXTENDED RELEASE ORAL DAILY
Qty: 30 TABLET | Refills: 3 | Status: SHIPPED | OUTPATIENT
Start: 2025-06-18

## 2025-06-18 RX ORDER — POTASSIUM CHLORIDE 20 MEQ/1
20 TABLET, EXTENDED RELEASE ORAL DAILY
Status: DISCONTINUED | OUTPATIENT
Start: 2025-06-18 | End: 2025-06-18 | Stop reason: HOSPADM

## 2025-06-18 RX ADMIN — FOLIC ACID 1 MG: 1 TABLET ORAL at 09:50

## 2025-06-18 RX ADMIN — ACETAMINOPHEN 1000 MG: 500 TABLET ORAL at 06:38

## 2025-06-18 RX ADMIN — Medication 1000 MCG: at 09:50

## 2025-06-18 RX ADMIN — Medication 1 TABLET: at 09:51

## 2025-06-18 RX ADMIN — POTASSIUM CHLORIDE 20 MEQ: 1500 TABLET, EXTENDED RELEASE ORAL at 09:50

## 2025-06-18 RX ADMIN — METOPROLOL SUCCINATE 50 MG: 50 TABLET, EXTENDED RELEASE ORAL at 09:50

## 2025-06-18 RX ADMIN — ASPIRIN 81 MG: 81 TABLET, COATED ORAL at 09:51

## 2025-06-18 RX ADMIN — TICAGRELOR 90 MG: 90 TABLET ORAL at 09:51

## 2025-06-18 RX ADMIN — FAMOTIDINE 20 MG: 20 TABLET, FILM COATED ORAL at 09:50

## 2025-06-18 SDOH — SOCIAL STABILITY: SOCIAL INSECURITY: WITHIN THE LAST YEAR, HAVE YOU BEEN HUMILIATED OR EMOTIONALLY ABUSED IN OTHER WAYS BY YOUR PARTNER OR EX-PARTNER?: NO

## 2025-06-18 SDOH — SOCIAL STABILITY: SOCIAL INSECURITY: ABUSE: ADULT

## 2025-06-18 SDOH — ECONOMIC STABILITY: HOUSING INSECURITY: AT ANY TIME IN THE PAST 12 MONTHS, WERE YOU HOMELESS OR LIVING IN A SHELTER (INCLUDING NOW)?: NO

## 2025-06-18 SDOH — SOCIAL STABILITY: SOCIAL INSECURITY: DO YOU FEEL UNSAFE GOING BACK TO THE PLACE WHERE YOU ARE LIVING?: NO

## 2025-06-18 SDOH — ECONOMIC STABILITY: FOOD INSECURITY: WITHIN THE PAST 12 MONTHS, THE FOOD YOU BOUGHT JUST DIDN'T LAST AND YOU DIDN'T HAVE MONEY TO GET MORE.: NEVER TRUE

## 2025-06-18 SDOH — ECONOMIC STABILITY: INCOME INSECURITY: IN THE PAST 12 MONTHS HAS THE ELECTRIC, GAS, OIL, OR WATER COMPANY THREATENED TO SHUT OFF SERVICES IN YOUR HOME?: NO

## 2025-06-18 SDOH — ECONOMIC STABILITY: FOOD INSECURITY: HOW HARD IS IT FOR YOU TO PAY FOR THE VERY BASICS LIKE FOOD, HOUSING, MEDICAL CARE, AND HEATING?: VERY HARD

## 2025-06-18 SDOH — ECONOMIC STABILITY: HOUSING INSECURITY: IN THE PAST 12 MONTHS, HOW MANY TIMES HAVE YOU MOVED WHERE YOU WERE LIVING?: 0

## 2025-06-18 SDOH — SOCIAL STABILITY: SOCIAL INSECURITY: HAVE YOU HAD THOUGHTS OF HARMING ANYONE ELSE?: NO

## 2025-06-18 SDOH — ECONOMIC STABILITY: FOOD INSECURITY: WITHIN THE PAST 12 MONTHS, YOU WORRIED THAT YOUR FOOD WOULD RUN OUT BEFORE YOU GOT THE MONEY TO BUY MORE.: NEVER TRUE

## 2025-06-18 SDOH — SOCIAL STABILITY: SOCIAL INSECURITY: WERE YOU ABLE TO COMPLETE ALL THE BEHAVIORAL HEALTH SCREENINGS?: YES

## 2025-06-18 SDOH — SOCIAL STABILITY: SOCIAL INSECURITY: ARE THERE ANY APPARENT SIGNS OF INJURIES/BEHAVIORS THAT COULD BE RELATED TO ABUSE/NEGLECT?: NO

## 2025-06-18 SDOH — SOCIAL STABILITY: SOCIAL INSECURITY: WITHIN THE LAST YEAR, HAVE YOU BEEN AFRAID OF YOUR PARTNER OR EX-PARTNER?: NO

## 2025-06-18 SDOH — ECONOMIC STABILITY: HOUSING INSECURITY: IN THE LAST 12 MONTHS, WAS THERE A TIME WHEN YOU WERE NOT ABLE TO PAY THE MORTGAGE OR RENT ON TIME?: NO

## 2025-06-18 SDOH — SOCIAL STABILITY: SOCIAL INSECURITY: DO YOU FEEL ANYONE HAS EXPLOITED OR TAKEN ADVANTAGE OF YOU FINANCIALLY OR OF YOUR PERSONAL PROPERTY?: NO

## 2025-06-18 SDOH — SOCIAL STABILITY: SOCIAL INSECURITY: HAVE YOU HAD ANY THOUGHTS OF HARMING ANYONE ELSE?: NO

## 2025-06-18 SDOH — SOCIAL STABILITY: SOCIAL INSECURITY: ARE YOU OR HAVE YOU BEEN THREATENED OR ABUSED PHYSICALLY, EMOTIONALLY, OR SEXUALLY BY ANYONE?: NO

## 2025-06-18 SDOH — ECONOMIC STABILITY: TRANSPORTATION INSECURITY: IN THE PAST 12 MONTHS, HAS LACK OF TRANSPORTATION KEPT YOU FROM MEDICAL APPOINTMENTS OR FROM GETTING MEDICATIONS?: NO

## 2025-06-18 SDOH — SOCIAL STABILITY: SOCIAL INSECURITY: HAS ANYONE EVER THREATENED TO HURT YOUR FAMILY OR YOUR PETS?: NO

## 2025-06-18 SDOH — SOCIAL STABILITY: SOCIAL INSECURITY: DOES ANYONE TRY TO KEEP YOU FROM HAVING/CONTACTING OTHER FRIENDS OR DOING THINGS OUTSIDE YOUR HOME?: NO

## 2025-06-18 ASSESSMENT — PAIN - FUNCTIONAL ASSESSMENT
PAIN_FUNCTIONAL_ASSESSMENT: 0-10
PAIN_FUNCTIONAL_ASSESSMENT: 0-10

## 2025-06-18 ASSESSMENT — LIFESTYLE VARIABLES
AUDIT-C TOTAL SCORE: 0
HOW OFTEN DO YOU HAVE 6 OR MORE DRINKS ON ONE OCCASION: NEVER
SKIP TO QUESTIONS 9-10: 1
PRESCIPTION_ABUSE_PAST_12_MONTHS: NO
HOW MANY STANDARD DRINKS CONTAINING ALCOHOL DO YOU HAVE ON A TYPICAL DAY: PATIENT DOES NOT DRINK
HOW OFTEN DO YOU HAVE A DRINK CONTAINING ALCOHOL: NEVER
SUBSTANCE_ABUSE_PAST_12_MONTHS: NO
AUDIT-C TOTAL SCORE: 0

## 2025-06-18 ASSESSMENT — ACTIVITIES OF DAILY LIVING (ADL)
DRESSING YOURSELF: INDEPENDENT
TOILETING: INDEPENDENT
LACK_OF_TRANSPORTATION: NO
GROOMING: INDEPENDENT
HEARING - RIGHT EAR: FUNCTIONAL
FEEDING YOURSELF: INDEPENDENT
HEARING - LEFT EAR: FUNCTIONAL
ADEQUATE_TO_COMPLETE_ADL: YES
WALKS IN HOME: INDEPENDENT
PATIENT'S MEMORY ADEQUATE TO SAFELY COMPLETE DAILY ACTIVITIES?: YES
JUDGMENT_ADEQUATE_SAFELY_COMPLETE_DAILY_ACTIVITIES: YES
ASSISTIVE_DEVICE: NONE;WALKER
BATHING: INDEPENDENT

## 2025-06-18 ASSESSMENT — PAIN DESCRIPTION - LOCATION: LOCATION: BACK

## 2025-06-18 ASSESSMENT — COGNITIVE AND FUNCTIONAL STATUS - GENERAL
CLIMB 3 TO 5 STEPS WITH RAILING: A LITTLE
MOBILITY SCORE: 23
DAILY ACTIVITIY SCORE: 24
PATIENT BASELINE BEDBOUND: YES

## 2025-06-18 ASSESSMENT — PATIENT HEALTH QUESTIONNAIRE - PHQ9
1. LITTLE INTEREST OR PLEASURE IN DOING THINGS: NOT AT ALL
2. FEELING DOWN, DEPRESSED OR HOPELESS: NOT AT ALL
SUM OF ALL RESPONSES TO PHQ9 QUESTIONS 1 & 2: 0

## 2025-06-18 ASSESSMENT — PAIN SCALES - GENERAL
PAINLEVEL_OUTOF10: 4
PAINLEVEL_OUTOF10: 0 - NO PAIN

## 2025-06-18 ASSESSMENT — PAIN DESCRIPTION - DESCRIPTORS: DESCRIPTORS: ACHING

## 2025-06-18 NOTE — PROGRESS NOTES
06/18/25 1239   Discharge Planning   Home or Post Acute Services None   Expected Discharge Disposition Home     Per Dr. Richards-likely home self care today. Per PT OT patient has no needs.     Cleared for dc per [facility]

## 2025-06-18 NOTE — DISCHARGE SUMMARY
Discharge Diagnosis  Leg swelling           Issues Requiring Follow-Up  Congestive heart failure.  Follow-up with cardiology.  Discharge Meds     Medication List      START taking these medications     potassium chloride CR 10 mEq ER tablet; Commonly known as: Klor-Con;   Take 1 tablet (10 mEq) by mouth once daily. Do not crush, chew, or split.     CONTINUE taking these medications     anastrozole 1 mg tablet; Commonly known as: Arimidex; Take 1 tablet (1   mg total) by mouth once daily.  Swallow whole with a drink of water.   aspirin 81 mg EC tablet   atorvastatin 40 mg tablet; Commonly known as: Lipitor; Take 1 tablet (40   mg) by mouth once daily.   cyanocobalamin 1,000 mcg tablet; Commonly known as: Vitamin B-12   famotidine 20 mg tablet; Commonly known as: Pepcid; Take 1 tablet (20   mg) by mouth 2 times a day.   folic acid 1 mg tablet; Commonly known as: Folvite   furosemide 20 mg tablet; Commonly known as: Lasix; Take 1 tablet (20 mg)   by mouth 2 times daily (morning and late afternoon).   magnesium oxide 400 mg magnesium capsule   metoprolol succinate XL 50 mg 24 hr tablet; Commonly known as:   Toprol-XL; Take 1 tablet (50 mg) by mouth once daily. Do not crush or   chew.   multivitamin tablet   nitroglycerin 0.4 mg SL tablet; Commonly known as: Nitrostat; Place 1   tablet (0.4 mg) under the tongue every 5 minutes if needed for chest pain   for up to 1 dose or as directed by provider. If no relief, call 911 as   directed.   ticagrelor 90 mg tablet; Commonly known as: Brilinta; Take 1 tablet (90   mg) by mouth 2 times a day.   traZODone 100 mg tablet; Commonly known as: Desyrel; Take 1 tablet (100   mg) by mouth once daily at bedtime.     STOP taking these medications     acetaminophen 500 mg tablet; Commonly known as: Tylenol       Test Results Pending At Discharge  Pending Labs       Order Current Status    Extra Tubes In process    Lavender Top In process            Hospital Course   Admitted yesterday  with fluid overload.  Received few doses of IV Lasix.  Fluid overload better.  Cardiology recommending she be discharged on the same medications as previous including furosemide and metoprolol.  I am adding some potassium chloride.  Cardiology thinks that if she watches her salt and fluids more closely that should help alleviate the problem.  She is to follow-up with her cardiologist next week.  Pertinent Physical Exam At Time of Discharge  Physical Exam    Outpatient Follow-Up  Future Appointments   Date Time Provider Department Center   6/20/2025 10:00 AM [facility]   6/23/2025 10:00 AM [facility]   6/25/2025 10:00 AM ALRS [facility] REH[facility] CLASS [facility]   6/27/2025 10:00 AM [facility]   6/30/2025 10:00 AM [facility] CLASS [facility]   7/2/2025 10:00 AM [facility]   7/7/2025 10:00 AM LARS [facility]ARD[facility] REH[facility]   7/9/2025 10:00 AM LARS [facility] CLASS [facility]   7/11/2025 10:00 AM LARS [facility] CLASS [facility]   7/14/2025 10:00 AM [facility] CLASS [facility]   7/16/2025 10:00 AM [facility] CLASS [facility]   7/18/2025 10:00 AM [facility]ARS [facility] CLASS [facility]   7/21/2025 10:00 AM [facility]ARS [facility] CLASS [facility]   7/23/2025 10:00 AM LARS [facility] CLASS [facility]   7/25/2025 10:00 AM LARS [facility] CLASS [facility]   7/28/2025 10:00 AM L[facility] CLASS [facility]   7/30/2025 10:00 AM [facility] CLASS [facility]   8/1/2025 10:00 AM LARS [facility] CLASS [facility]   8/4/2025 10:00 AM LARS [facility] REH[facility] CLASS [facility]   8/6/2025 10:00 AM LARS [facility] CLASS [facility]   8/8/2025 10:00 AM LARS [facility] CLASS [facility]   8/11/2025 10:00 AM LARS [facility] CARDIAC REHAB CLASS  [facility]   8/13/2025 10:00 AM LARS [facility] CARD[facility] REHAB CLASS [facility]   8/15/2025 10:00 AM LARS [facility] REHAB CLASS [facility]   8/18/2025 10:00 AM LARS [facility]ARD[facility] REHAB CLASS [facility]   8/20/2025 10:00 AM LARS [facility] REH[facility] CLASS [facility]   8/22/2025 10:00 AM LARS [facility] CARD[facility] REHAB CLASS [facility]   8/25/2025 10:00 AM LARS [facility] REH[facility] CLASS [facility]   8/27/2025 10:00 AM LARS [facility] REH[facility] CLASS [facility]   8/29/2025 10:00 AM [facility] CLASS [facility]   9/3/2025  9:00 AM [facility] REH[facility] CLASS [facility]   9/3/2025 10:00 AM Jayson Hinson PA-C [id number] [facility]   9/5/2025 10:00 AM [facility]ARS [facility] REH[facility] CLASS [facility]   9/8/2025 10:00 AM LARS [facility] REH[facility] CLASS [facility]   9/24/2025 10:45 AM Maribel Bateman MD [id number] [facility]   11/12/2025  2:00 PM Marimar Hutson PA-C [id number] [facility]   11/12/2025  2:30 PM INF 01 [facility] [id number] [place]         Naeem Richards MD

## 2025-06-18 NOTE — CARE PLAN
The patient's goals for the shift include      The clinical goals for the shift include      Over the shift, the patient did not make progress toward the following goals. Barriers to progression include . Recommendations to address these barriers include .  Problem: Safety - Adult  Goal: Free from fall injury  Outcome: Progressing

## 2025-06-18 NOTE — PROGRESS NOTES
"Physical Therapy                 Therapy Communication Note -- Screen    Patient Name: Julia Carrion  MRN: [id number]  Department: [address]  Room: [address] [place] [address]-A  Today's Date: 6/18/2025     Discipline: Physical Therapy    Visit Reason: Screen    Time: 08:56    Comment: Order received and chart reviewed. This 65 y.o. F admitted for acute on chronic systolic HF reported no concerns with functional mobility within her home environment. Observed sit<>stand with Rollator walker at beside chair: nearly mod I but Vcs for brake management. Pt stated, paraphrased 'I know but I never lock the brakes.\" Standing bilat PF: steady with BUE supported on Rollator without brakes. Order completed and PT discontinued.      "

## 2025-06-18 NOTE — NURSING NOTE
Discussed cardiac rehab with patient/. Patient currently enrolled with cardiac rehab. Encouraged cardiology follow up post discharge. Smoking cessation education given to patient/.

## 2025-06-18 NOTE — PROGRESS NOTES
"Julia Carrion is a 65 y.o. female on day 1 of admission presenting with Leg swelling.    Subjective   Patient up to chair this morning. States she would like to be discharged home.        Objective     Physical Exam  Vitals reviewed.   Constitutional:       General: She is not in acute distress.  HENT:      Head: Normocephalic and atraumatic.   Cardiovascular:      Rate and Rhythm: Normal rate and regular rhythm.      Heart sounds: No murmur heard.     No friction rub. No gallop.   Pulmonary:      Effort: Pulmonary effort is normal.      Breath sounds: Normal breath sounds. No wheezing, rhonchi or rales.   Abdominal:      General: Bowel sounds are normal.      Palpations: Abdomen is soft.   Musculoskeletal:      Right lower leg: Edema present.      Left lower leg: Edema present.   Skin:     General: Skin is warm and dry.      Capillary Refill: Capillary refill takes less than 2 seconds.   Neurological:      Mental Status: She is alert and oriented to person, place, and time.   Psychiatric:         Mood and Affect: Mood normal.         Behavior: Behavior normal.         Last Recorded Vitals  Blood pressure 111/69, pulse 75, temperature 36.7 °C (98.1 °F), temperature source Oral, resp. rate 16, height 1.549 m (5' 1\"), weight 74.8 kg (165 lb), SpO2 94%.  Intake/Output last 3 Shifts:  No intake/output data recorded.    Relevant Results  Results for orders placed or performed during the hospital encounter of 06/17/25 (from the past 24 hours)   Urinalysis with Reflex Culture and Microscopic   Result Value Ref Range    Color, Urine Yellow Light-Yellow, Yellow, Dark-Yellow    Appearance, Urine Clear Clear    Specific Gravity, Urine 1.020 1.005 - 1.035    pH, Urine 5.5 5.0, 5.5, 6.0, 6.5, 7.0, 7.5, 8.0    Protein, Urine NEGATIVE NEGATIVE, 10 (TRACE), 20 (TRACE) mg/dL    Glucose, Urine Normal Normal mg/dL    Blood, Urine NEGATIVE NEGATIVE mg/dL    Ketones, Urine NEGATIVE NEGATIVE mg/dL    Bilirubin, Urine NEGATIVE " NEGATIVE mg/dL    Urobilinogen, Urine Normal Normal mg/dL    Nitrite, Urine NEGATIVE NEGATIVE    Leukocyte Esterase, Urine NEGATIVE NEGATIVE   Extra Urine Gray Tube   Result Value Ref Range    Extra Tube Hold for add-ons.    CBC and Auto Differential   Result Value Ref Range    WBC 9.5 4.4 - 11.3 x10*3/uL    nRBC 0.0 0.0 - 0.0 /100 WBCs    RBC 4.06 4.00 - 5.20 x10*6/uL    Hemoglobin 13.2 12.0 - 16.0 g/dL    Hematocrit 39.6 36.0 - 46.0 %    MCV 98 80 - 100 fL    MCH 32.5 26.0 - 34.0 pg    MCHC 33.3 32.0 - 36.0 g/dL    RDW 12.7 11.5 - 14.5 %    Platelets 183 150 - 450 x10*3/uL    Neutrophils % 71.4 40.0 - 80.0 %    Immature Granulocytes %, Automated 0.4 0.0 - 0.9 %    Lymphocytes % 19.2 13.0 - 44.0 %    Monocytes % 7.2 2.0 - 10.0 %    Eosinophils % 1.4 0.0 - 6.0 %    Basophils % 0.4 0.0 - 2.0 %    Neutrophils Absolute 6.76 1.20 - 7.70 x10*3/uL    Immature Granulocytes Absolute, Automated 0.04 0.00 - 0.70 x10*3/uL    Lymphocytes Absolute 1.82 1.20 - 4.80 x10*3/uL    Monocytes Absolute 0.68 0.10 - 1.00 x10*3/uL    Eosinophils Absolute 0.13 0.00 - 0.70 x10*3/uL    Basophils Absolute 0.04 0.00 - 0.10 x10*3/uL   Comprehensive Metabolic Panel   Result Value Ref Range    Glucose 101 (H) 74 - 99 mg/dL    Sodium 140 136 - 145 mmol/L    Potassium 3.7 3.5 - 5.3 mmol/L    Chloride 101 98 - 107 mmol/L    Bicarbonate 30 21 - 32 mmol/L    Anion Gap 13 10 - 20 mmol/L    Urea Nitrogen 13 6 - 23 mg/dL    Creatinine 0.79 0.50 - 1.05 mg/dL    eGFR 83 >60 mL/min/1.73m*2    Calcium 9.5 8.6 - 10.3 mg/dL    Albumin 4.2 3.4 - 5.0 g/dL    Alkaline Phosphatase 67 33 - 136 U/L    Total Protein 7.4 6.4 - 8.2 g/dL    AST 12 9 - 39 U/L    Bilirubin, Total 0.4 0.0 - 1.2 mg/dL    ALT 10 7 - 45 U/L   Magnesium   Result Value Ref Range    Magnesium 1.94 1.60 - 2.40 mg/dL   B-Type Natriuretic Peptide   Result Value Ref Range     (H) 0 - 99 pg/mL   Troponin I, High Sensitivity, Initial   Result Value Ref Range    Troponin I, High Sensitivity 29  (H) 0 - 13 ng/L   Troponin, High Sensitivity, 1 Hour   Result Value Ref Range    Troponin I, High Sensitivity 29 (H) 0 - 13 ng/L   Basic Metabolic Panel   Result Value Ref Range    Glucose 110 (H) 74 - 99 mg/dL    Sodium 140 136 - 145 mmol/L    Potassium 3.5 3.5 - 5.3 mmol/L    Chloride 100 98 - 107 mmol/L    Bicarbonate 31 21 - 32 mmol/L    Anion Gap 13 10 - 20 mmol/L    Urea Nitrogen 16 6 - 23 mg/dL    Creatinine 0.81 0.50 - 1.05 mg/dL    eGFR 81 >60 mL/min/1.73m*2    Calcium 8.9 8.6 - 10.3 mg/dL        Assessment & Plan  Leg swelling    Acute on Chronic Heart failure with reduced ejection fraction: Lower extremity edema improved. Patient on room air. I have resumed her home dose of oral furosemide 20 mg twice daily.   Ischemic Cardiomyopathy: We have been unable to use standard guideline directed medical therapy in the outpatient setting giving her issues with hypotension.  Continue metoprolol succinate with parameters for blood pressure and heart rate.  Recent non-ST elevation myocardial infarction: Patient without anginal symptoms.  Continue patient on aspirin and Brilinta for dual antiplatelet therapy.  Continue atorvastatin 40 mg nightly.  No anginal type symptoms at this point.  Chronic obstructive pulmonary disease: Management per primary  Hyperlipidemia: Continue statin  History of cerebrovascular accident: Management per primary  Current Tobacco Use: Patient down to 10 cigarettes per day from 2 packs per day. Smoking cessation education provided.      Impression and Plan:     6/17: Patient was sent to the emergency department with complaints of lower extremity swelling as well as bruising to the tops of both feet.  She reports compliance with her oral furosemide 20 mg twice daily at home.  She did have a recent non-ST elevation myocardial infarction at the end of May with percutaneous coronary intervention to the proximal LAD.  Ejection fraction at that time was found to be about 35 to 40%.  The patient  has no complaints of worsening shortness of breath or orthopnea.  No complaints of chest pain or pressure. She was given a dose of IV Lasix in the emergency department.  Chest x-ray without evidence of pulmonary vascular congestion, but her BNP is elevated.  The patient did just have an echocardiogram completed in May, no need to repeat this study this admission as she will have one prior to her next follow up with Dr. Bateman. Continue dual platelet therapy with aspirin and Brilinta.  Continue high intensity statin.  Again, we have been limited on what we can use for guideline directed therapy given the patient's issues with hypotension.  Continue metoprolol succinate 50 mg daily with parameters for blood pressure and heart rate.  Agree with gentle IV diuresis, will reevaluate the patient in the morning.  Will follow with you.    6/18: As above.  Patient reports significant improvement of lower extremity edema and that she is ready for discharge home.  She refused her morning dose of IV Lasix.  At the time my exam the patient is sitting up in the chair.  She is on room air with adequate pulse oximetry.  Remains in sinus rhythm on telemetry.  Blood pressures remain low normal with last recorded 111/69.  The patient I had a long conversation regarding her diet and lifestyle.  She does report to me that she frequently eats salty foods.  I advised that this could be the reason for her fluid overload and recommended a low salt diet.  The patient reports understanding. No objection from a cardiac perspective for discharge home today.  Will continue home dose of furosemide 20 mg twice daily.  Continue metoprolol succinate 50 mg daily.  Continue dual antiplatelet therapy with ticagrelor and aspirin.  I advised the patient to follow-up with Dr. Bateman in the outpatient setting in 1 to 2 weeks following discharge.         Marcia Prince, APRN-CNP

## 2025-06-18 NOTE — PROGRESS NOTES
Occupational Therapy                 Therapy Communication Note    Patient Name: Julia Carrion  MRN: [id number]  Department: [address]  Room: [facility]  Today's Date: 6/18/2025     Discipline: Occupational Therapy    Missed Visit: OT Missed Visit: Yes     Missed Visit Reason: Missed Visit Reason: Other (Comment) (Declines need for therapy at this time - will sign off)

## 2025-06-18 NOTE — PROGRESS NOTES
06/18/25 1352   Discharge Planning   Living Arrangements Spouse/significant other   Support Systems Spouse/significant other   Assistance Needed rollator at bedside   Type of Residence Private residence   Who is requesting discharge planning? Provider   Home or Post Acute Services None   Expected Discharge Disposition Home   Does the patient need discharge transport arranged? No     Abbreviated assessment completed due to Dr. Richards's presence at bedside and readiness to discharge the patient. Pt amenable to discharge and has ride home. Pt will resume her current outpatient rehab services     Dispo: home self care   Dago today    Cleared for dc by [facility]

## 2025-06-19 ENCOUNTER — PATIENT OUTREACH (OUTPATIENT)
Dept: PRIMARY CARE | Facility: CLINIC | Age: 65
End: 2025-06-19
Payer: MEDICARE

## 2025-06-19 NOTE — PROGRESS NOTES
Discharge Facility: [facility]  Discharge Diagnosis: leg swelling  Admission Date: 6/17/25  Discharge Date:  6/18/25    PCP Appointment Date:  Pt declined  Specialist Appointment Date: TBD - cardiology  Hospital Encounter and Summary Linked: Yes  ED to Hosp-Admission (Discharged) with Naeem Richards MD; Alberto Dumont MD (06/17/2025)   See discharge assessment below for further details     Wrap Up  Wrap Up Additional Comments: Successful transitions of care outreach to patient. Patient reports she is doing well since discharge. Reviewed new medication and she confirmed having and starting this today, no questions. She reports she is waiting to hear back from her cardiologist as she is unable to go back to cardiac rehab until given the okay by cardiology. Reviewed recommendation for patient to follow a low sodium diet and she denies any questions about this. She was seen by her PCP day of admission and instructed to follow up in four weeks from ER/hospital discharge. Offered to assist patient with scheduling this appt, however she declined and stated she would call office directly when she's ready to schedule.  No additional questions or concerns noted. (6/19/2025  2:41 PM)    Engagement  Call Start Time: 1434 (6/19/2025  2:41 PM)    Medications  Medications reviewed with patient/caregiver?: Yes (6/19/2025  2:41 PM)  Is the patient having any side effects they believe may be caused by any medication additions or changes?: No (6/19/2025  2:41 PM)  Does the patient have all medications ordered at discharge?: Yes (6/19/2025  2:41 PM)  Care Management Interventions: No intervention needed (6/19/2025  2:41 PM)  Prescription Comments: Start: potassium chloride 10meq once daily (6/19/2025  2:41 PM)  Is the patient taking all medications as directed (includes completed medication regime)?: Yes (6/19/2025  2:41 PM)  Medication Comments: Reviewed new medication with patient and she has no questions regarding meds (6/19/2025   2:41 PM)    Appointments  Does the patient have a primary care provider?: Yes (6/19/2025  2:41 PM)  Care Management Interventions: -- (Pt's PCP documented in note prior to her admission that she should follow up in four weeks after discharge. Pt declined to schedule at this time.) (6/19/2025  2:41 PM)  Has the patient kept scheduled appointments due by today?: Not applicable (6/19/2025  2:41 PM)    Self Management  What is the home health agency?: n/a (6/19/2025  2:41 PM)  Has home health visited the patient within 72 hours of discharge?: Not applicable (6/19/2025  2:41 PM)    Patient Teaching  Does the patient have access to their discharge instructions?: Yes (6/19/2025  2:41 PM)  Care Management Interventions: Reviewed instructions with patient (6/19/2025  2:41 PM)  What is the patient's perception of their health status since discharge?: Improving (6/19/2025  2:41 PM)  Is the patient/caregiver able to teach back the hierarchy of who to call/visit for symptoms/problems? PCP, Specialist, Home Health nurse, Urgent Care, ED, 911: Yes (6/19/2025  2:41 PM)

## 2025-06-20 ENCOUNTER — APPOINTMENT (OUTPATIENT)
Dept: CARDIAC REHAB | Facility: CLINIC | Age: 65
End: 2025-06-20
Payer: MEDICARE

## 2025-06-21 PROCEDURE — RXMED WILLOW AMBULATORY MEDICATION CHARGE

## 2025-06-23 ENCOUNTER — CLINICAL SUPPORT (OUTPATIENT)
Dept: CARDIAC REHAB | Facility: CLINIC | Age: 65
End: 2025-06-23
Payer: MEDICARE

## 2025-06-23 DIAGNOSIS — I21.02 ST ELEVATION MYOCARDIAL INFARCTION INVOLVING LEFT ANTERIOR DESCENDING CORONARY ARTERY (MULTI): ICD-10-CM

## 2025-06-23 DIAGNOSIS — Z95.5 STENTED CORONARY ARTERY: ICD-10-CM

## 2025-06-24 ENCOUNTER — TELEPHONE (OUTPATIENT)
Dept: HEMATOLOGY/ONCOLOGY | Facility: CLINIC | Age: 65
End: 2025-06-24
Payer: MEDICARE

## 2025-06-24 NOTE — TELEPHONE ENCOUNTER
Pt requesting furosemide rx ,famotidine rx and ibu rx to be sent to [facility] pharmacy  Marimar Hutson pt

## 2025-06-25 ENCOUNTER — APPOINTMENT (OUTPATIENT)
Dept: CARDIAC REHAB | Facility: CLINIC | Age: 65
End: 2025-06-25
Payer: MEDICARE

## 2025-06-26 ENCOUNTER — PHARMACY VISIT (OUTPATIENT)
Dept: PHARMACY | Facility: CLINIC | Age: 65
End: 2025-06-26
Payer: MEDICARE

## 2025-06-26 DIAGNOSIS — I50.21 ACUTE SYSTOLIC HEART FAILURE: Primary | ICD-10-CM

## 2025-06-27 ENCOUNTER — APPOINTMENT (OUTPATIENT)
Dept: CARDIAC REHAB | Facility: CLINIC | Age: 65
End: 2025-06-27
Payer: MEDICARE

## 2025-06-30 ENCOUNTER — APPOINTMENT (OUTPATIENT)
Dept: CARDIAC REHAB | Facility: CLINIC | Age: 65
End: 2025-06-30
Payer: MEDICARE

## 2025-06-30 DIAGNOSIS — R60.0 BILATERAL LEG EDEMA: Primary | ICD-10-CM

## 2025-06-30 DIAGNOSIS — I50.21 ACUTE SYSTOLIC HEART FAILURE: ICD-10-CM

## 2025-06-30 NOTE — TELEPHONE ENCOUNTER
Pt lvm stating hs is calling for refill. Last ov 6/17/25 pt asking for rx called ibu or idu 400 mgs please call to confirm    Not on med  list

## 2025-07-01 RX ORDER — FUROSEMIDE 20 MG/1
20 TABLET ORAL
Qty: 180 TABLET | Refills: 0 | Status: SHIPPED | OUTPATIENT
Start: 2025-07-01 | End: 2025-09-29

## 2025-07-01 NOTE — TELEPHONE ENCOUNTER
Called the patient back to obtain additional Rx. Patient stated  mg (Ibuprofen) which was prescribed in the ED. Rx not on current Rx list. Please advise.

## 2025-07-02 ENCOUNTER — APPOINTMENT (OUTPATIENT)
Dept: CARDIAC REHAB | Facility: CLINIC | Age: 65
End: 2025-07-02
Payer: MEDICARE

## 2025-07-03 ENCOUNTER — TELEPHONE (OUTPATIENT)
Facility: CLINIC | Age: 65
End: 2025-07-03
Payer: MEDICARE

## 2025-07-03 NOTE — TELEPHONE ENCOUNTER
----- Message from Nurse Sheridan SHOOK sent at 6/20/2025  1:57 PM EDT -----  Regarding: FW: pt needs a call back frrom the office    ----- Message -----  From: Phyllis Holm  Sent: 6/20/2025   1:21 PM EDT  To: Gerardo [phone] Card1 Clinical Support Staff; Gerardo#  Subject: pt needs a call back frrom the office            Good afternoon, pt sees Dr. Bateman for cardiology and had some issues regarding her physical therapy. Can someone in the office please give the pt a call back at their earliest. Thank you and have a great day.

## 2025-07-07 ENCOUNTER — APPOINTMENT (OUTPATIENT)
Dept: CARDIAC REHAB | Facility: CLINIC | Age: 65
End: 2025-07-07
Payer: MEDICARE

## 2025-07-07 ENCOUNTER — DOCUMENTATION (OUTPATIENT)
Dept: CARDIAC REHAB | Facility: CLINIC | Age: 65
End: 2025-07-07
Payer: MEDICARE

## 2025-07-07 NOTE — PROGRESS NOTES
Cardiac Rehabilitation Individual Treatment Plan  30 Day Reassessment      Today's Date:   7/7/2025                       Program Location: [address], [place]    Name: Julia Carrion                            Medical Record Number: [id number]                          YOB: 1960    Referring Physician: No referring provider defined for this encounter. N/A  Primary Care Physician: India Johnston MD PhD    Referring Diagnosis / Date of Diagnosis  STEMI 5/22/25  STENT 5/22/25      AACVPR Risk Stratification:  High    Learning Assessment:  Cardiac Knowledge Test:       Pre: 11/15                          Post:        /15  Learning assessment/barriers: Transportation, chronic pain - spouse will be driving pt  Preferred learning method: Auditory, Visual, Reading handout, and Writing handout      Education Classes: Schedule given with education manual.    Psychosocial REASSESSMENT    Psychosocial Assessment  Pt reported/currently experiencing stress : Yes, Stress and Depression. No dx of depression but low mood and tearful at intake  Current Stress Level (1-10 scale):  4/10 Patient has not be reevaluated for stress. On hold.   History of anxiety/depression: No  Currently seeing a mental health provider: No   Psychosocial Meds: No  Medication changes: No    Social Support: Yes, Whom:spouse, Joselito and adult children     Psychosocial Test:  PHQ-9  PHQ-9 score:   Pre:  7  Mid:     Post:     Interpretation:    5-9 PHQ-9 mild depression and Needs to complete     Psychosocial Plan   Goal Status:   In progress   Psychosocial Goals:  Attend Art Therapy, Attend Chair Yoga, Improve stress level, Participate in music therapy cooldown. Patient has been on hold for medical reasons.    Psychosocial Interventions/Education:  Family support, Staff and patient discussion      Nutrition REASSESSMENT    Nutrition Assessment  Picture Your Plate Score:  Pre: 45  Post:  Picture Your Plate Interpretation:   PYP 41-50  "needs improvement for good healthy patterns   Special Diet:   none   Dietary Goal:  Mediterranean Diet;  PYP >60     Weight Management:   Weight:  160 lb   Height:  61 \"  BMI:  30  Weighing Daily: no - has home scale, given daily wt calendar and CHF zone chart  Goal Weight:   145 lbs    Heart Failure Assessment:   Most recent EF: 35%  Date: 5/22/25  Hx of Heart Failure: No    Heart Failure medications: Yes - Lasix    Heart Failure medication changes: No     Comments: pt plans to hold Lasix prior to exercise session    Nutrition Plan   Goal Status: In progress  Nutrition Goals:   BMI <30, develop heart healthy diet, daily weights, verbalize understanding of s/sx CHF    RD Recommendations:        Nutrition Interventions/Education: CHF zone chart and daily wt log (date given 6/9/25) Returned diet survey.       Exercise REASSESSMENT    Exercise Assessment    Fall Risk Results:  medium  Initial Intake: Starting MET Level 2.4   Current MET level 2.4    Discharge MET level n/a    6-Min Walk Test:     PRE:     Feet:    990       METS:  2.4                 POST:  Feet:    n/a       METS:  n/a                    Current Home Exercise:   PT exercises only  Mode: n/a  Frequency (days/week):   n/a  Duration(mins/day):    n/a    Exercise Plan  Exercise Prescription based on 6MWT and/or evaluation   Frequency:   2-3   days/week   Duration:   40  minutes    Intensity: RPE (0-10 Dilma Scale):  2-4 (light to somewhat hard)  Target HR: rest + 30 bpm  Intensity: rest + 30  SpO2 Range   >88%, interval training prn    O2 Flow Rate  N/A   Progression:   Aim to progress 0.2- 0.3 METs/week or as tolerated  Mode:  Aerobic exercise  Initial MET level determined by the exercise physiologist from 1:1 evaluation and/or 6MWT, physician approved as documented.     Modality METS Load    Duration   1 Pre-Exercise/Rest      20:00   2 Treadmill N/a N/a mph   N/a%   40:00   3 Sci RB 2.9 1.5 level   50-60RPM   40:00   4 NuStep 1.8 18 level   60-80SPM   " 40:00   5 Physiostepper 2.5 2 level   30-50SPM   40:00   6 Recumbent Bike 2.6 1 level   50-60RPM   40:00   7 Magnum UBE N/a 0 load   40-50RPM   40:00   8 Steps  2 inch    40:00   9 N/A      40:00   10 Final Cooldown      20:00     Resistance Training: no      Home Exercise Prescription/Self Report Log given: no     Goal Status: In progress  Exercise Goals: Improve 6MWT by 10%, Increase 30-40% in functional capacity overall, Safe & comfortable with exercise equipment, Uses RPE Dilma Scale (0-10) & exercises at RPE of 2-4, Increase 10-20% in functional capacity by next reassessment  Exercise Interventions/Education: Practice the RPE Dilma Scale (0-10)    Other Core Components/Risk Factors REASSESSMENT    Other Core Components Assessment  MEDICATION Adherence:              Taking medications as prescribed: yes   Uses pill box/organizer: need to assess   Carries medication list: yes     HYPERTENSION  Management:  Hx of Hypertension: Yes   Resting BP: 104/66  Peak Exercise BP:   124/60  Current Medications: Yes - metoprolol    Hypertension medication changes: No     TOBACCO/SMOKING Cessation:  Tobacco Use:    YES, cigarettes one half pack per day  Date started:   1985  Date quit:    Quit Date set:   Medications:   Comment:  reduced from 2PPD- pt not ready to quit. Patient has not been to cardiac rehab for medical reasons. Has not been reassessed.     DIABETES Management:  Diabetes:  Yes borderline  Medication: No  Medication changes: N/A  Hemoglobin A1C:   Lab Results   Component Value Date    HGBA1C 5.8 (H) 05/23/2025                 Pt monitors BS at home:  No  Comment:  pre-diabetic, needs education. Has not be at cardiac rehab for medical reasons. Has not been reassessed.     HYPERLIPIDEMIA Management:      Lipids: see lab values below   Medications:  Yes - atorvastatin  Medication changes:   No  Lab Results   Component Value Date    CHOL 122 05/23/2025    CHOL 161 10/26/2021     Lab Results   Component Value Date     "HDL 32.7 05/23/2025    HDL 42 (L) 10/26/2021     Lab Results   Component Value Date    LDLCALC 48 05/23/2025    LDLCALC 83 10/26/2021     Lab Results   Component Value Date    TRIG 205 (H) 05/23/2025    TRIG 182 (H) 10/26/2021           Other Core Components Plan  Goal Status: In progress  Other Core Component Goals:   Achieve & maintain a resting blood pressure less than 130/80  Gain knowledge of cardiac disease and lifestyle modifications by discharge  Compliant with medications & carries medication list  Cholesterol <180, HDL >45, LDL <70, Trig <150  Lifestyle modification for risk factor reduction, Tobacco free    Other Core Component Interventions/Education:   Staff:Patient Discussion Patient has home BP cuff. Will start daily weights at home.     Individual Patient Goals:  Increase strength & endurance  Aerobic exercise 3-6x per week  \"Live a little bit longer\", be able to attend family functions    General Comments:  Education class schedule given with manual by 1st session  Lecture handouts provided  Individual education & counseling  Patient has been absent from cardiac rehab for medical reasons. Will reassess patient when she returns.     Rehab Staff Signature: Riana Dominguez       "

## 2025-07-09 ENCOUNTER — APPOINTMENT (OUTPATIENT)
Dept: CARDIAC REHAB | Facility: CLINIC | Age: 65
End: 2025-07-09
Payer: MEDICARE

## 2025-07-11 PROCEDURE — RXMED WILLOW AMBULATORY MEDICATION CHARGE

## 2025-07-15 ENCOUNTER — PHARMACY VISIT (OUTPATIENT)
Dept: PHARMACY | Facility: CLINIC | Age: 65
End: 2025-07-15
Payer: MEDICARE

## 2025-07-15 PROCEDURE — RXMED WILLOW AMBULATORY MEDICATION CHARGE

## 2025-07-16 ENCOUNTER — PHARMACY VISIT (OUTPATIENT)
Dept: PHARMACY | Facility: CLINIC | Age: 65
End: 2025-07-16
Payer: MEDICARE

## 2025-07-16 ENCOUNTER — OFFICE VISIT (OUTPATIENT)
Facility: CLINIC | Age: 65
End: 2025-07-16
Payer: MEDICARE

## 2025-07-16 VITALS
BODY MASS INDEX: 31.18 KG/M2 | HEART RATE: 71 BPM | SYSTOLIC BLOOD PRESSURE: 118 MMHG | OXYGEN SATURATION: 96 % | DIASTOLIC BLOOD PRESSURE: 60 MMHG | WEIGHT: 165 LBS

## 2025-07-16 DIAGNOSIS — E78.2 MIXED HYPERLIPIDEMIA: ICD-10-CM

## 2025-07-16 DIAGNOSIS — I25.2 HISTORY OF MI (MYOCARDIAL INFARCTION): ICD-10-CM

## 2025-07-16 DIAGNOSIS — I50.21 ACUTE SYSTOLIC HEART FAILURE: Primary | ICD-10-CM

## 2025-07-16 PROCEDURE — 1160F RVW MEDS BY RX/DR IN RCRD: CPT | Performed by: INTERNAL MEDICINE

## 2025-07-16 PROCEDURE — 99212 OFFICE O/P EST SF 10 MIN: CPT

## 2025-07-16 PROCEDURE — 1159F MED LIST DOCD IN RCRD: CPT | Performed by: INTERNAL MEDICINE

## 2025-07-16 PROCEDURE — 1125F AMNT PAIN NOTED PAIN PRSNT: CPT | Performed by: INTERNAL MEDICINE

## 2025-07-16 PROCEDURE — 99214 OFFICE O/P EST MOD 30 MIN: CPT | Performed by: INTERNAL MEDICINE

## 2025-07-16 ASSESSMENT — ENCOUNTER SYMPTOMS
DEPRESSION: 1
LOSS OF SENSATION IN FEET: 1
OCCASIONAL FEELINGS OF UNSTEADINESS: 1

## 2025-07-16 ASSESSMENT — PAIN SCALES - GENERAL: PAINLEVEL_OUTOF10: 10-WORST PAIN EVER

## 2025-07-16 ASSESSMENT — PATIENT HEALTH QUESTIONNAIRE - PHQ9
2. FEELING DOWN, DEPRESSED OR HOPELESS: NOT AT ALL
SUM OF ALL RESPONSES TO PHQ9 QUESTIONS 1 AND 2: 0
1. LITTLE INTEREST OR PLEASURE IN DOING THINGS: NOT AT ALL

## 2025-07-16 ASSESSMENT — LIFESTYLE VARIABLES: TOTAL SCORE: 0

## 2025-07-16 NOTE — PATIENT INSTRUCTIONS
Start Jardiance 10 mg 1 tablet daily.  This pill will help you get rid of fluids and may help with your legs.  If you feel you are getting dehydrated with the furosemide twice a day then take it just once a day while you are on Jardiance and you could always increase it to twice a day if more swelling you develop or shortness of breath.    Continue the aspirin, atorvastatin, metoprolol and Brilinta as it is.  May continue off of the potassium.  Let me see you in 6 months.

## 2025-07-16 NOTE — PROGRESS NOTES
[facility] [place]    Interventional Cardiology    History of present illness:  This is very pleasant 65-year-old female with history of smoking, hypertension, hyperlipidemia and coronary artery disease status post PCI to proximal LAD in the setting of non-STEMI in May 2025.  Patient returns to my office for follow-up.  Reports no chest pain or shortness of breath.  She still having lower extremity edema.  Denies having orthopnea PND or syncope.  Has been having a lot of issues with low back pain radiating to lower extremity.  Had recent hospitalization for lower extremity edema was discharged home.    Medical History[1]    Surgical History[2]    Allergies[3]     reports that she has been smoking cigarettes. She started smoking about 40 years ago. She has a 60.6 pack-year smoking history. She has never been exposed to tobacco smoke. She has never used smokeless tobacco. She reports current alcohol use of about 7.0 standard drinks of alcohol per week. She reports that she does not use drugs.    Family History[4]    Patient's Medications   New Prescriptions    No medications on file   Previous Medications    ANASTROZOLE (ARIMIDEX) 1 MG TABLET    Take 1 tablet (1 mg total) by mouth once daily.  Swallow whole with a drink of water.    ASPIRIN 81 MG EC TABLET    Take 1 tablet (81 mg) by mouth once daily.    ATORVASTATIN (LIPITOR) 40 MG TABLET    Take 1 tablet (40 mg) by mouth once daily.    CYANOCOBALAMIN (VITAMIN B-12) 1,000 MCG TABLET    Take 1 tablet (1,000 mcg) by mouth once daily.    FAMOTIDINE (PEPCID) 20 MG TABLET    Take 1 tablet (20 mg) by mouth 2 times a day.    FOLIC ACID (FOLVITE) 1 MG TABLET    Take 1 tablet (1 mg) by mouth once daily.    FUROSEMIDE (LASIX) 20 MG TABLET    Take 1 tablet (20 mg) by mouth 2 times daily (morning and late afternoon).    MAGNESIUM OXIDE 400 MG MAGNESIUM CAPSULE    Take by mouth.    METOPROLOL SUCCINATE XL (TOPROL-XL) 50 MG 24 HR TABLET     Take 1 tablet (50 mg) by mouth once daily. Do not crush or chew.    MULTIVITAMIN TABLET    Take 1 tablet by mouth once daily.    NITROGLYCERIN (NITROSTAT) 0.4 MG SL TABLET    Place 1 tablet (0.4 mg) under the tongue every 5 minutes if needed for chest pain for up to 1 dose or as directed by provider. If no relief, call 911 as directed.    POTASSIUM CHLORIDE CR (KLOR-CON) 10 MEQ ER TABLET    Take 1 tablet (10 mEq) by mouth once daily. Do not crush, chew, or split.    TICAGRELOR (BRILINTA) 90 MG TABLET    Take 1 tablet (90 mg) by mouth 2 times a day.    TRAZODONE (DESYREL) 100 MG TABLET    Take 1 tablet (100 mg) by mouth once daily at bedtime.   Modified Medications    No medications on file   Discontinued Medications    No medications on file       Objective   Physical Exam  General: Patient in no acute distress   HEENT: Atraumatic normocephalic.  Neck: Supple, jugular venous pressure within normal limit.  No bruits  Lungs: Clear to auscultation bilaterally  Cardiovascular: Regular rate and rhythm, normal heart sounds, no murmurs rubs or gallops  Abdomen: Soft nontender nondistended.  Normal bowel sounds.  Extremities: Warm to touch, no edema.      Lab Review   Admission on 06/17/2025, Discharged on 06/18/2025   Component Date Value    WBC 06/17/2025 9.5     nRBC 06/17/2025 0.0     RBC 06/17/2025 4.06     Hemoglobin 06/17/2025 13.2     Hematocrit 06/17/2025 39.6     MCV 06/17/2025 98     MCH 06/17/2025 32.5     MCHC 06/17/2025 33.3     RDW 06/17/2025 12.7     Platelets 06/17/2025 183     Neutrophils % 06/17/2025 71.4     Immature Granulocytes %,* 06/17/2025 0.4     Lymphocytes % 06/17/2025 19.2     Monocytes % 06/17/2025 7.2     Eosinophils % 06/17/2025 1.4     Basophils % 06/17/2025 0.4     Neutrophils Absolute 06/17/2025 6.76     Immature Granulocytes Ab* 06/17/2025 0.04     Lymphocytes Absolute 06/17/2025 1.82     Monocytes Absolute 06/17/2025 0.68     Eosinophils Absolute 06/17/2025 0.13     Basophils Absolute  06/17/2025 0.04     Glucose 06/17/2025 101 (H)     Sodium 06/17/2025 140     Potassium 06/17/2025 3.7     Chloride 06/17/2025 101     Bicarbonate 06/17/2025 30     Anion Gap 06/17/2025 13     Urea Nitrogen 06/17/2025 13     Creatinine 06/17/2025 0.79     eGFR 06/17/2025 83     Calcium 06/17/2025 9.5     Albumin 06/17/2025 4.2     Alkaline Phosphatase 06/17/2025 67     Total Protein 06/17/2025 7.4     AST 06/17/2025 12     Bilirubin, Total 06/17/2025 0.4     ALT 06/17/2025 10     Magnesium 06/17/2025 1.94     BNP 06/17/2025 616 (H)     Troponin I, High Sensiti* 06/17/2025 29 (H)     Color, Urine 06/17/2025 Yellow     Appearance, Urine 06/17/2025 Clear     Specific Gravity, Urine 06/17/2025 1.020     pH, Urine 06/17/2025 5.5     Protein, Urine 06/17/2025 NEGATIVE     Glucose, Urine 06/17/2025 Normal     Blood, Urine 06/17/2025 NEGATIVE     Ketones, Urine 06/17/2025 NEGATIVE     Bilirubin, Urine 06/17/2025 NEGATIVE     Urobilinogen, Urine 06/17/2025 Normal     Nitrite, Urine 06/17/2025 NEGATIVE     Leukocyte Esterase, Urine 06/17/2025 NEGATIVE     Extra Tube 06/17/2025 Hold for add-ons.     Troponin I, High Sensiti* 06/17/2025 29 (H)     Glucose 06/18/2025 110 (H)     Sodium 06/18/2025 140     Potassium 06/18/2025 3.5     Chloride 06/18/2025 100     Bicarbonate 06/18/2025 31     Anion Gap 06/18/2025 13     Urea Nitrogen 06/18/2025 16     Creatinine 06/18/2025 0.81     eGFR 06/18/2025 81     Calcium 06/18/2025 8.9    Admission on 06/01/2025, Discharged on 06/01/2025   Component Date Value    Ventricular Rate 06/01/2025 69     Atrial Rate 06/01/2025 69     HI Interval 06/01/2025 142     QRS Duration 06/01/2025 66     QT Interval 06/01/2025 428     QTC Calculation(Bazett) 06/01/2025 458     P Axis 06/01/2025 9     R [place] 06/01/2025 24     T Axis 06/01/2025 -27     QRS Count 06/01/2025 12     Q Onset 06/01/2025 228     P Onset 06/01/2025 157     P Offset 06/01/2025 191     T Offset 06/01/2025 442     QTC Fredericia  06/01/2025 448    Orders Only on 05/31/2025   Component Date Value    GLUCOSE 06/02/2025 117 (H)     UREA NITROGEN (BUN) 06/02/2025 20     CREATININE 06/02/2025 0.93     EGFR 06/02/2025 68     BUN/CREATININE RATIO 06/02/2025 SEE NOTE:     SODIUM 06/02/2025 140     POTASSIUM 06/02/2025 3.9     CHLORIDE 06/02/2025 98     CARBON DIOXIDE 06/02/2025 32     ELECTROLYTE BALANCE 06/02/2025 10     CALCIUM 06/02/2025 9.7    Admission on 05/22/2025, Discharged on 05/24/2025   Component Date Value    aPTT 05/22/2025 34.6 (H)     Protime 05/22/2025 11.2     INR 05/22/2025 1.0     WBC 05/22/2025 14.3 (H)     nRBC 05/22/2025 0.0     RBC 05/22/2025 4.74     Hemoglobin 05/22/2025 15.4     Hematocrit 05/22/2025 45.1     MCV 05/22/2025 95     MCH 05/22/2025 32.5     MCHC 05/22/2025 34.1     RDW 05/22/2025 12.5     Platelets 05/22/2025 230     Neutrophils % 05/22/2025 78.0     Immature Granulocytes %,* 05/22/2025 0.4     Lymphocytes % 05/22/2025 13.8     Monocytes % 05/22/2025 7.2     Eosinophils % 05/22/2025 0.3     Basophils % 05/22/2025 0.3     Neutrophils Absolute 05/22/2025 11.16 (H)     Immature Granulocytes Ab* 05/22/2025 0.06     Lymphocytes Absolute 05/22/2025 1.97     Monocytes Absolute 05/22/2025 1.03 (H)     Eosinophils Absolute 05/22/2025 0.04     Basophils Absolute 05/22/2025 0.05     Glucose 05/22/2025 144 (H)     Sodium 05/22/2025 136     Potassium 05/22/2025 4.1     Chloride 05/22/2025 99     Bicarbonate 05/22/2025 25     Anion Gap 05/22/2025 16     Urea Nitrogen 05/22/2025 13     Creatinine 05/22/2025 0.92     eGFR 05/22/2025 69     Calcium 05/22/2025 9.6     Albumin 05/22/2025 4.6     Alkaline Phosphatase 05/22/2025 95     Total Protein 05/22/2025 8.0     AST 05/22/2025 279 (H)     Bilirubin, Total 05/22/2025 0.6     ALT 05/22/2025 42     Troponin I, High Sensiti* 05/22/2025 54,983 ()     Troponin I, High Sensiti* 05/22/2025 49,951 ()     Ventricular Rate 05/22/2025 85     Atrial Rate 05/22/2025 85     OR  Interval 05/22/2025 162     QRS Duration 05/22/2025 70     QT Interval 05/22/2025 366     QTC Calculation(Bazett) 05/22/2025 435     P Axis 05/22/2025 54     R Axis 05/22/2025 183     T Axis 05/22/2025 19     QRS Count 05/22/2025 14     Q Onset 05/22/2025 228     P Onset 05/22/2025 147     P Offset 05/22/2025 197     T Offset 05/22/2025 411     QTC Fredericia 05/22/2025 411     POCT Activated Clotting * 05/22/2025 287 (H)     AV pk cecile 05/22/2025 1.47     LVOT diam 05/22/2025 1.90     AV mn grad 05/22/2025 4     MV E/A ratio 05/22/2025 0.84     LV Biplane EF 05/22/2025 49     LV EF 05/22/2025 38     LVIDd 05/22/2025 5.47     AV pk grad 05/22/2025 9     Aortic Valve Area by Con* 05/22/2025 2.28     Aortic Valve Area by Con* 05/22/2025 2.29     LV A4C EF 05/22/2025 52.7     Magnesium 05/22/2025 2.14     Phosphorus 05/22/2025 2.9     BNP 05/22/2025 294 (H)     WBC 05/23/2025 11.7 (H)     nRBC 05/23/2025 0.0     RBC 05/23/2025 4.33     Hemoglobin 05/23/2025 14.0     Hematocrit 05/23/2025 41.4     MCV 05/23/2025 96     MCH 05/23/2025 32.3     MCHC 05/23/2025 33.8     RDW 05/23/2025 12.5     Platelets 05/23/2025 185     Neutrophils % 05/23/2025 67.8     Immature Granulocytes %,* 05/23/2025 0.3     Lymphocytes % 05/23/2025 19.3     Monocytes % 05/23/2025 10.8     Eosinophils % 05/23/2025 1.4     Basophils % 05/23/2025 0.4     Neutrophils Absolute 05/23/2025 7.89 (H)     Immature Granulocytes Ab* 05/23/2025 0.04     Lymphocytes Absolute 05/23/2025 2.25     Monocytes Absolute 05/23/2025 1.26 (H)     Eosinophils Absolute 05/23/2025 0.16     Basophils Absolute 05/23/2025 0.05     Glucose 05/23/2025 135 (H)     Sodium 05/23/2025 136     Potassium 05/23/2025 3.8     Chloride 05/23/2025 104     Bicarbonate 05/23/2025 27     Anion Gap 05/23/2025 9 (L)     Urea Nitrogen 05/23/2025 17     Creatinine 05/23/2025 0.84     eGFR 05/23/2025 77     Calcium 05/23/2025 9.1     Albumin 05/23/2025 4.0     Alkaline Phosphatase 05/23/2025 85      Total Protein 05/23/2025 7.0     AST 05/23/2025 144 (H)     Bilirubin, Total 05/23/2025 0.7     ALT 05/23/2025 36     Magnesium 05/23/2025 2.09     Phosphorus 05/23/2025 3.2     Cholesterol 05/23/2025 122     HDL-Cholesterol 05/23/2025 32.7     Cholesterol/HDL Ratio 05/23/2025 3.7     LDL Calculated 05/23/2025 48     VLDL 05/23/2025 41 (H)     Triglycerides 05/23/2025 205 (H)     Non HDL Cholesterol 05/23/2025 89     POCT Calcium, Ionized 05/23/2025 1.14     Hemoglobin A1C 05/23/2025 5.8 (H)     Estimated Average Glucose 05/23/2025 120     Ventricular Rate 05/23/2025 70     Atrial Rate 05/23/2025 70     MI Interval 05/23/2025 156     QRS Duration 05/23/2025 82     QT Interval 05/23/2025 450     QTC Calculation(Bazett) 05/23/2025 486     P Axis 05/23/2025 61     R [place] 05/23/2025 -35     T Axis 05/23/2025 125     QRS Count 05/23/2025 12     Q Onset 05/23/2025 221     P Onset 05/23/2025 143     P Offset 05/23/2025 196     T Offset 05/23/2025 446     QTC Fredericia 05/23/2025 474     WBC 05/24/2025 10.8     nRBC 05/24/2025 0.0     RBC 05/24/2025 4.28     Hemoglobin 05/24/2025 13.8     Hematocrit 05/24/2025 40.3     MCV 05/24/2025 94     MCH 05/24/2025 32.2     MCHC 05/24/2025 34.2     RDW 05/24/2025 12.5     Platelets 05/24/2025 186     Neutrophils % 05/24/2025 69.3     Immature Granulocytes %,* 05/24/2025 0.5     Lymphocytes % 05/24/2025 18.2     Monocytes % 05/24/2025 10.1     Eosinophils % 05/24/2025 1.5     Basophils % 05/24/2025 0.4     Neutrophils Absolute 05/24/2025 7.45     Immature Granulocytes Ab* 05/24/2025 0.05     Lymphocytes Absolute 05/24/2025 1.96     Monocytes Absolute 05/24/2025 1.09 (H)     Eosinophils Absolute 05/24/2025 0.16     Basophils Absolute 05/24/2025 0.04     Glucose 05/24/2025 121 (H)     Sodium 05/24/2025 136     Potassium 05/24/2025 3.7     Chloride 05/24/2025 103     Bicarbonate 05/24/2025 26     Anion Gap 05/24/2025 11     Urea Nitrogen 05/24/2025 25 (H)     Creatinine 05/24/2025 1.16  (H)     eGFR 05/24/2025 52 (L)     Calcium 05/24/2025 8.7     Albumin 05/24/2025 4.0     Alkaline Phosphatase 05/24/2025 80     Total Protein 05/24/2025 6.8     AST 05/24/2025 62 (H)     Bilirubin, Total 05/24/2025 0.7     ALT 05/24/2025 26     Ventricular Rate 05/24/2025 70     Atrial Rate 05/24/2025 70     MD Interval 05/24/2025 166     QRS Duration 05/24/2025 80     QT Interval 05/24/2025 478     QTC Calculation(Bazett) 05/24/2025 516     P Axis 05/24/2025 63     R [place] 05/24/2025 -21     T Axis 05/24/2025 126     QRS Count 05/24/2025 11     Q Onset 05/24/2025 220     P Onset 05/24/2025 137     P Offset 05/24/2025 191     T Offset 05/24/2025 459     QTC Fredericia 05/24/2025 503         Assessment/Plan   Problem List[5]     This is very pleasant 65-year-old female with history of smoking, hypertension, hyperlipidemia and coronary artery disease status post PCI to proximal LAD in the setting of non-STEMI in May 2025.  Patient returns to my office for follow-up.  Reports no chest pain or shortness of breath.  She still having lower extremity edema.  Denies having orthopnea PND or syncope.  Has been having a lot of issues with low back pain radiating to lower extremity.  Had recent hospitalization for lower extremity edema was discharged home.    At this point I will start patient on Jardiance 10 mg oral daily.  Will monitor her renal function in the future.  Meanwhile she may continue all other medications the same.  She is on aspirin, Brilinta, furosemide 20 mg oral twice daily.  She is also on atorvastatin 40 mg oral daily.  We will repeat another echocardiogram in 2 months from now to make sure her ejection fraction has improved.  May restart cardiac rehab.  Patient not on ACE ARB's or Arni due to low blood pressure.  We may consider starting Entresto later.  Await her echocardiogram for final recommendation         Maribel Bateman MD              [1]   Past Medical History:  Diagnosis Date    Breast cancer      Cancer (Multi)     Deficiency of other specified B group vitamins     Folate deficiency    Myocardial infarction (Multi)     Personal history of diseases of the blood and blood-forming organs and certain disorders involving the immune mechanism     History of macrocytosis    Personal history of other diseases of the circulatory system     History of hypertension    Personal history of other diseases of the nervous system and sense organs     History of peripheral neuropathy    Personal history of other endocrine, nutritional and metabolic disease     History of hypokalemia    Personal history of transient ischemic attack (TIA), and cerebral infarction without residual deficits     History of stroke    Sleep apnea     Stroke (Multi)    [2]   Past Surgical History:  Procedure Laterality Date    BI STEREOTACTIC GUIDED BREAST LEFT LOCALIZATION AND BIOPSY Left 03/28/2022    BI STEREOTACTIC GUIDED BREAST LOCALIZATION AND BIOPSY LEFT [facility] CLINICAL LEGACY    BI US GUIDED BREAST LOCALIZATION AND BIOPSY RIGHT Right 03/28/2022    BI US GUIDED BREAST LOCALIZATION AND BIOPSY RIGHT [facility]GAC[facility]    BI US GUIDED BREAST LOCALIZATION RIGHT Right 09/26/2022    BI US GUIDED BREAST LOCALIZATION RIGHT [facility] INPATIENT LEGACY    BREAST SURGERY      CARDIAC CATHETERIZATION N/A 05/22/2025    Procedure: LHC, No LV;  Surgeon: Maribel Bateman MD;  Location: [facility] Cardiac Cath Lab;  Service: Cardiovascular;  Laterality: N/A;    CARDIAC CATHETERIZATION N/A 05/22/2025    Procedure: PCI;  Surgeon: Maribel Bateman MD;  Location: [facility] Cardiac Cath Lab;  Service: Cardiovascular;  Laterality: N/A;    EYE SURGERY      HYSTERECTOMY      LYMPH NODE BIOPSY      MASTECTOMY      MR HEAD ANGIO WO IV CONTRAST  10/26/2021    MR HEAD ANGIO WO IV CONTRAST [facility] EMERGENCY LEGACY    OTHER SURGICAL HISTORY  01/04/2022    Cataract surgery    OTHER SURGICAL HISTORY  01/04/2022    Hysterectomy    TUBAL LIGATION      US GUIDED BIOPSY LYMPH NODE SUPERFICIAL  03/28/2022     US GUIDED BIOPSY LYMPH NODE SUPERFICIAL [facility]   [3] No Known Allergies  [4]   Family History  Problem Relation Name Age of Onset    Cancer Sister La     Breast cancer Sister La    [5]   Patient Active Problem List  Diagnosis    Personal history of breast cancer    Acid reflux    Deficiency of other specified B group vitamins    Personal history of diseases of the blood and blood-forming organs and certain disorders involving the immune mechanism    Personal history of other diseases of the circulatory system    Personal history of other diseases of the nervous system and sense organs    Personal history of other endocrine, nutritional and metabolic disease    Personal history of transient ischemic attack (TIA), and cerebral infarction without residual deficits    Malignant neoplasm of right breast in female, estrogen receptor positive    Hyperlipidemia    Low back pain    Bilateral leg edema    Acute systolic heart failure    Myocardial infarction involving left anterior descending (LAD) coronary artery (Multi)    Prediabetes    History of MI (myocardial infarction)    Secondary and unspecified malignant neoplasm of axilla and upper limb lymph nodes    Hemiplegia and hemiparesis following cerebral infarction affecting left non-dominant side (Multi)    Leg swelling

## 2025-07-18 ENCOUNTER — APPOINTMENT (OUTPATIENT)
Dept: CARDIAC REHAB | Facility: CLINIC | Age: 65
End: 2025-07-18
Payer: MEDICARE

## 2025-07-21 ENCOUNTER — CLINICAL SUPPORT (OUTPATIENT)
Dept: CARDIAC REHAB | Facility: CLINIC | Age: 65
End: 2025-07-21
Payer: MEDICARE

## 2025-07-21 DIAGNOSIS — Z95.5 STENTED CORONARY ARTERY: ICD-10-CM

## 2025-07-21 DIAGNOSIS — I21.02 ST ELEVATION MYOCARDIAL INFARCTION INVOLVING LEFT ANTERIOR DESCENDING CORONARY ARTERY (MULTI): ICD-10-CM

## 2025-07-21 PROCEDURE — 93798 PHYS/QHP OP CAR RHAB W/ECG: CPT | Performed by: INTERNAL MEDICINE

## 2025-07-23 ENCOUNTER — CLINICAL SUPPORT (OUTPATIENT)
Dept: CARDIAC REHAB | Facility: CLINIC | Age: 65
End: 2025-07-23
Payer: MEDICARE

## 2025-07-23 DIAGNOSIS — I21.02 ST ELEVATION MYOCARDIAL INFARCTION INVOLVING LEFT ANTERIOR DESCENDING CORONARY ARTERY (MULTI): ICD-10-CM

## 2025-07-23 DIAGNOSIS — Z95.5 STENTED CORONARY ARTERY: ICD-10-CM

## 2025-07-23 PROCEDURE — 93798 PHYS/QHP OP CAR RHAB W/ECG: CPT | Performed by: INTERNAL MEDICINE

## 2025-07-25 ENCOUNTER — CLINICAL SUPPORT (OUTPATIENT)
Dept: CARDIAC REHAB | Facility: CLINIC | Age: 65
End: 2025-07-25
Payer: MEDICARE

## 2025-07-25 DIAGNOSIS — I21.02 ST ELEVATION MYOCARDIAL INFARCTION INVOLVING LEFT ANTERIOR DESCENDING CORONARY ARTERY (MULTI): ICD-10-CM

## 2025-07-25 DIAGNOSIS — Z95.5 STENTED CORONARY ARTERY: ICD-10-CM

## 2025-07-25 PROCEDURE — 93798 PHYS/QHP OP CAR RHAB W/ECG: CPT | Performed by: INTERNAL MEDICINE

## 2025-07-28 ENCOUNTER — CLINICAL SUPPORT (OUTPATIENT)
Dept: CARDIAC REHAB | Facility: CLINIC | Age: 65
End: 2025-07-28
Payer: MEDICARE

## 2025-07-28 ENCOUNTER — APPOINTMENT (OUTPATIENT)
Dept: RADIOLOGY | Facility: HOSPITAL | Age: 65
End: 2025-07-28
Payer: MEDICARE

## 2025-07-28 ENCOUNTER — TELEPHONE (OUTPATIENT)
Dept: PRIMARY CARE | Facility: CLINIC | Age: 65
End: 2025-07-28
Payer: MEDICARE

## 2025-07-28 ENCOUNTER — HOSPITAL ENCOUNTER (EMERGENCY)
Facility: HOSPITAL | Age: 65
Discharge: HOME | End: 2025-07-28
Payer: MEDICARE

## 2025-07-28 ENCOUNTER — APPOINTMENT (OUTPATIENT)
Dept: CARDIOLOGY | Facility: HOSPITAL | Age: 65
End: 2025-07-28
Payer: MEDICARE

## 2025-07-28 VITALS
TEMPERATURE: 98.1 F | DIASTOLIC BLOOD PRESSURE: 72 MMHG | WEIGHT: 160 LBS | RESPIRATION RATE: 16 BRPM | HEIGHT: 62 IN | SYSTOLIC BLOOD PRESSURE: 126 MMHG | HEART RATE: 86 BPM | OXYGEN SATURATION: 93 % | BODY MASS INDEX: 29.44 KG/M2

## 2025-07-28 DIAGNOSIS — I21.02 ST ELEVATION MYOCARDIAL INFARCTION INVOLVING LEFT ANTERIOR DESCENDING CORONARY ARTERY (MULTI): ICD-10-CM

## 2025-07-28 DIAGNOSIS — Z95.5 STENTED CORONARY ARTERY: ICD-10-CM

## 2025-07-28 DIAGNOSIS — M79.89 LEFT ARM SWELLING: Primary | ICD-10-CM

## 2025-07-28 PROCEDURE — 93971 EXTREMITY STUDY: CPT | Performed by: RADIOLOGY

## 2025-07-28 PROCEDURE — 99284 EMERGENCY DEPT VISIT MOD MDM: CPT | Mod: 25

## 2025-07-28 PROCEDURE — 93971 EXTREMITY STUDY: CPT

## 2025-07-28 PROCEDURE — 93005 ELECTROCARDIOGRAM TRACING: CPT

## 2025-07-28 ASSESSMENT — LIFESTYLE VARIABLES
EVER HAD A DRINK FIRST THING IN THE MORNING TO STEADY YOUR NERVES TO GET RID OF A HANGOVER: NO
TOTAL SCORE: 0
HAVE PEOPLE ANNOYED YOU BY CRITICIZING YOUR DRINKING: NO
HAVE YOU EVER FELT YOU SHOULD CUT DOWN ON YOUR DRINKING: NO
EVER FELT BAD OR GUILTY ABOUT YOUR DRINKING: NO

## 2025-07-28 NOTE — TELEPHONE ENCOUNTER
Called and informed patient of the providers recommendation to go to the ED. Patient expressed understanding to go to ED and NOT the UC. Patient stated OK and thanked me for calling.

## 2025-07-28 NOTE — PROGRESS NOTES
Subjective   Julia Carrion is a 65 y.o. female who presents for sick visit with complaints of swelling in arm, and follow-up for multiple health issues.    HPI:      65 y.o. female  SMOKER (1.5 ppd x 45 years)  who presents for sick visit with complaints of swelling in arm, and follow-up for multiple health issues.       EMR/EPIC records reviewed     Last seen by me on 6/17/2025 for SICK VISIT with complaints of swelling in feet and bruised right foot, and follow up for DLD, and other health issues.  At visit:     LE swelling, right foot pain and bruising: Lung exam WNL.  R/o DVT and acute on chronic systolic heart failure. Patient is hemodynamically stable.  -patient advised to go to [facility] emergency room to rule out acute on chronic heart failure and DVT.   Patient is safe to drive herself to ED  -report called to Franky at [facility] Emergency Room  -will defer ordering bilateral duplex venous, XR right foot, and labs to [facility] ED  -emergency Dept precautions discussed ad reviewed with patient     CAD, STEMI s/p PCI, and HFrEF: followed by cardiology  -cont medications and management by cardiology  -911/EMS cardiac precautions discussed and reviewed with patient     Hyperlipidemia  -cont statin  - low cholesterol diet, regularly exercise, and limit alcohol intake        Chronic lower back pain: no red flag signs/sxs, followed by orthopedic surgery spine  -cont management by orthopedic surgery spine  -Emergency Dept precautions discussed and reviwed with patient        History of breast cancer: followed by oncology  -cont medications and management by oncology      Smoking dependence: precontemplative  -encouraged patient to cut back and quit smoking  -patient declined smoking cessation resources        Immunizations Counseling  -DAP vaccine now due==> declined  -recommend updated COVID spike vaccine, shingles, TDAP, and RSV that can be obtained at local pharmacy     FOLLOW-UP: 4 weeks after ED/hospital  discharge        PMHx:  HTN  Hyperlipidemia  HFrEF  STEMI; 5/22/25  CAD s/p  cardiac catheterization showing critical 99% proximal LAD that was treated with drug-eluting stent without complications  (5/2025)  GERD  Malignant neoplasm of right breast in female, estrogen receptor positive  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 The left ventricular systolic function is moderately decreased, with a visually estimated ejection fraction of 35-40%.   2. Abnormal wall motion.   3. Spectral Doppler shows an abnormal pattern of left ventricular diastolic filling.   4. There is severe hypokinesis of the mid and distal anteroseptal wall and the entire LV apex.               Healthcare Providers:  Oncology: Marimar Hutson PA-C ; breast cancer  Cardiology: Dr. Bateman and Bret Alfred MD  Nephrology:   [facility] pulmonology: KATHERINE Toledo.CNP  Dermatology: Dr. Ledesma   Prior PCP: DR. Abrams        Preventive Health Services:  -Last Medicare wellness Visit: 12/12/24; NOW DUE  -last pap smear:  no longer indicated s/p hysterectomy  -last mammogram: 2022==> NOW DUE; ordered 12/12/24  -last colonoscopy/cologuard: ?==> NOW DUE  -last STI screening: ?  -Hep C screening: ?  -DEXA: 4/30/25: Osteopenia        Immunizations:  -Childhood vaccines: completed per patient  -updated COVID spike vaccine: NOW DUE  -TDAP: NOW DUE  -Flu vaccine: DUE NOW  -shingles: NOW DUE  -RSV: NOW DUE  -Prevnar 20: DUE NOW          There is no immunization  "history on file for this patient.         INTERVAL HISTORY:     -saw cardiology for STEMI and CAD s/p PCI (5/2025), hypertension, hyperlipidemia       -patient sent from clinic on 6/17/25 to ED due to concern for CHF exacerbation and to rule out DVT and was admitted 6/17-6/18/25 for CHF. ED and inpatient notes, Hospital discharge summary, labs, and imaging reviewed.  Per hospital discharge summary:    \"Discharge Diagnosis  Leg swelling           Issues Requiring Follow-Up  Congestive heart failure.  Follow-up with cardiology.  Discharge Meds     Medication List      START taking these medications     potassium chloride CR 10 mEq ER tablet; Commonly known as: Klor-Con;   Take 1 tablet (10 mEq) by mouth once daily. Do not crush, chew, or split.     CONTINUE taking these medications     anastrozole 1 mg tablet; Commonly known as: Arimidex; Take 1 tablet (1   mg total) by mouth once daily.  Swallow whole with a drink of water.   aspirin 81 mg EC tablet   atorvastatin 40 mg tablet; Commonly known as: Lipitor; Take 1 tablet (40   mg) by mouth once daily.   cyanocobalamin 1,000 mcg tablet; Commonly known as: Vitamin B-12   famotidine 20 mg tablet; Commonly known as: Pepcid; Take 1 tablet (20   mg) by mouth 2 times a day.   folic acid 1 mg tablet; Commonly known as: Folvite   furosemide 20 mg tablet; Commonly known as: Lasix; Take 1 tablet (20 mg)   by mouth 2 times daily (morning and late afternoon).   magnesium oxide 400 mg magnesium capsule   metoprolol succinate XL 50 mg 24 hr tablet; Commonly known as:   Toprol-XL; Take 1 tablet (50 mg) by mouth once daily. Do not crush or   chew.   multivitamin tablet   nitroglycerin 0.4 mg SL tablet; Commonly known as: Nitrostat; Place 1   tablet (0.4 mg) under the tongue every 5 minutes if needed for chest pain   for up to 1 dose or as directed by provider. If no relief, call 911 as   directed.   ticagrelor 90 mg tablet; Commonly known as: Brilinta; Take 1 tablet (90   mg) by mouth " 2 times a day.   traZODone 100 mg tablet; Commonly known as: Desyrel; Take 1 tablet (100   mg) by mouth once daily at bedtime.     STOP taking these medications     acetaminophen 500 mg tablet; Commonly known as: Tylenol        Test Results Pending At Discharge  Pending Labs         Order Current Status     Extra Tubes In process     Lavender Top In process                Hospital Course   Admitted yesterday with fluid overload.  Received few doses of IV Lasix.  Fluid overload better.  Cardiology recommending she be discharged on the same medications as previous including furosemide and metoprolol.  I am adding some potassium chloride.  Cardiology thinks that if she watches her salt and fluids more closely that should help alleviate the problem.  She is to follow-up with her cardiologist next week.  Pertinent Physical Exam At Time of Discharge  Physical Exam     Outpatient Follow-Up         Future Appointments   Date Time Provider Department Center   6/20/2025 10:00 AM [facility]   6/23/2025 10:00 AM [facility]   6/25/2025 10:00 AM [facility]   6/27/2025 10:00 AM [facility]   6/30/2025 10:00 AM [facility]   7/2/2025 10:00 AM [facility]   7/7/2025 10:00 AM [facility]   7/9/2025 10:00 AM [facility]   7/11/2025 10:00 AM [facility]   7/14/2025 10:00 AM [facility]   7/16/2025 10:00 AM [facility]   7/18/2025 10:00 AM [facility]   7/21/2025 10:00 AM [facility]   7/23/2025 10:00 AM [facility]   7/25/2025 10:00 AM [id number]   7/28/2025  "10:00 AM [facility]   7/30/2025 10:00 AM [facility]   8/1/2025 10:00 AM [facility]   8/4/2025 10:00 AM [facility]   8/6/2025 10:00 AM [facility]   8/8/2025 10:00 AM [facility]   8/11/2025 10:00 AM [facility]   8/13/2025 10:00 AM [facility]   8/15/2025 10:00 AM [facility]   8/18/2025 10:00 AM [facility]   8/20/2025 10:00 AM [facility]   8/22/2025 10:00 AM [facility]   8/25/2025 10:00 AM [facility]   8/27/2025 10:00 AM [facility]   8/29/2025 10:00 AM [facility]   9/3/2025  9:00 AM [facility]   9/3/2025 10:00 AM Jayson Hinson PA-C [id number]   9/5/2025 10:00 AM [facility]   9/8/2025 10:00 AM [facility]   9/24/2025 10:45 AM Maribel Bateman MD [id number] [facility]   11/12/2025  2:00 PM Marimar Hutson PA-C [id number] [facility]   11/12/2025  2:30 PM INF 01 [id number]       \"           Today  Julia reports:     - swelling of right arm x days, rated in severity as /10, not worsening over time.  She denies fever/chills, chest pain, heart palpitations, dizziness, shortness of breath, arm pain, cough, hemoptysis, orthopnea, diaphoresis, or any known arm trauma.    -IMPROVED bilateral foot  and leg swelling since hospital discharge for CHF exacerbation/volume overload. She denies fevers/chills, headache, confusion, CP, heart palpitations, dizziness, SOB, cough, orthopnea, exertional dyspnea, diaphoresis, leg or foot pain, " difficulty walking, or any known history of leg or right foot trauma.      -ongoing chronic lower back pain x 1+ years, rated in severity 5-7/10, unchanged since last visit not worsening over time. She reports in the past was seen in ED and given steroids that was helpful and is requesting medrol dose pack. She denies fevers/chills, neck pain, saddle anesthesia, urinary or fecal incontinence or retention, numbness/tingling, weakness, difficulty ambulating  or history of back trauma. She reports that she uses a  rollator walker.  Follows with orthopedic spine Dr. Courtney. At last visit  referral to PT and orthopedic spine for evaluation were ordered.      -otherwise doing and feeling well     -taking all medications as prescribed with no reported adverse medication side effects     -has scheduled appt with cardiac rehab, pulmonology, and cardiology     Today  she  has no other reported complaints, issues, or problems.  ROS is NEG for HEADACHE, NAUSEA, VOMITING, DIARRHEA, CHEST PAIN, SOB, and BLEEDING.  Review of systems (12) is negative for all systems except for any identified issues in HPI above.         Objective     LMP  (LMP Unknown)      Physical Examination:       GENERAL           General Appearance: well-appearing, well-developed, well-hydrated, well-nourished, no acute distress.        HEENT           NECK supple, no masses or thyromegaly, no carotid bruit.        EYES           Extraocular Movements: normal, bilateral eyes TRISH, no conjunctival injection.        HEART           Rate and Rhythm regular rate and rhythm. Heart sounds: normal S1S2, no S3 or S4. Murmurs: none.        CHEST           Breath sounds: Clear to IPPA, RR<16 no use of accessory muscles.        ABDOMEN           General: Neg for LKKS or masses, no scleral icterus or jaundice.        MUSCULOSKELETAL           Joints Demonstration: Neg for erythema, swelling or joint deformities. gross abnormalities no gross abnormalities.   SPINE  (cervical==> coccyx): non-tender to palpation. Somewhat limited ROM of spine due to pain. + TTP of paraspinal lumbar muscles. ARM: + swelling of        EXTREMITIES           Lower Extremities:   2+ pitting edema from feet to knees bilaterally. No cyanosis, no clubbing, warm well perfused.        Assessment/Plan   Problem List Items Addressed This Visit    None    Arm swelling: Lung exam WNL.  R/o DVT and acute on chronic systolic heart failure. Patient is hemodynamically stable.  -patient advised to go to [facility] emergency room to rule out acute on chronic heart failure and DVT.   Patient is safe to drive herself to ED  -report called to Franky at [facility] Emergency Room  -will defer ordering upper extremity  duplex venous and labs to [facility] ED  -911/EMS precautions discussed and reviewed with patient    LE swelling: Lung exam WNL.  R/o DVT and acute on chronic systolic heart failure. Patient is hemodynamically stable.  -patient advised to go to [facility] emergency room to rule out acute on chronic heart failure and DVT.   Patient is safe to drive herself to ED  -report called to [facility] Emergency Room  -will defer ordering bilateral duplex venous, XR right foot, and labs to [facility] ED  -emergency Dept precautions discussed ad reviewed with patient     CAD, STEMI s/p PCI, and HFrEF: followed by cardiology. Admit 6/17-6/18/25 for ADHF and volume overload.  -cont medications and management by cardiology  -Emergency Dept and 911/EMS cardiac precautions discussed and reviewed with patient     Hyperlipidemia  -cont statin  - low cholesterol diet, regularly exercise, and limit alcohol intake        Chronic lower back pain: no red flag signs/sxs, followed by orthopedic surgery spine  -cont management by orthopedic surgery spine  -Emergency Dept precautions discussed and reviwed with patient        History of breast cancer: followed by oncology  -cont medications and management by oncology      Smoking dependence:  precontemplative  -encouraged patient to cut back and quit smoking  -patient declined smoking cessation resources        Immunizations Counseling  --recommend updated COVID spike vaccine, shingles, TDAP, and RSV that can be obtained at local pharmacy     FOLLOW-UP: 4 weeks after ED/hospital discharge for 30 minute hospital follow up visit     I have personally reviewed all available pertinent labs, imaging, and consult notes with the patient.      Discussed recommended plan of care with patient. Patient expressed understanding and agreement with plan of care. All of patient's questions were answered at the time. Patient had no additional questions at the time.               India Johnston MD, PhD

## 2025-07-28 NOTE — DISCHARGE INSTRUCTIONS
Your ultrasound today showed no evidence of blood clot.  Please follow-up with your primary care physician tomorrow for reevaluation.  If you start to have changes of coloring to your left arm, worsening swelling, the entire redness or warmth, numbness, return to the emergency department for reevaluation

## 2025-07-28 NOTE — TELEPHONE ENCOUNTER
PT calling stating she recently started taking RX jardiance and is now experiencing swelling in her left arm. PT states she read that this could be a side effect of the medication and wanting to know if she should go to the ER, or wait for appointment with PCP 7/29/2025. PT requesting a return call at [phone]

## 2025-07-28 NOTE — TELEPHONE ENCOUNTER
Swelling  in her unlikely to be caused by Jardiance.  Please advise patient I recommend that she go to the emergency room for evaluation to rule out a blood clot clot in her arm, and not wait until tomorrow to see me.  Thank you

## 2025-07-28 NOTE — ED PROVIDER NOTES
HPI   Chief Complaint   Patient presents with    Arm Swelling       HPI  This is a 65-year-old female presenting to the emergency department for evaluation of left arm swelling.  Patient does have a history of breast cancer to the left side.  In remission.  No injury or trauma to her left arm.  No pain with lifting her left shoulder.  She does not have any chest pain or shortness of breath.  Patient does have history of stroke to the left side so has residual paresthesia but no worsening paresthesia or numbness.  No discoloration to the arm.  No fevers or chills.  Patient is on Brilinta.  Her son states that he noticed that this past Friday she had obvious swelling to the upper aspect of the left arm compared to the right.  Since Friday the swelling has gone down considerably.  Patient is on Brilinta for previous history of STEMI.  She does follow-up with cardiac rehab.    Please see HPI for pertinent positive and negative ROS.      Patient History   Medical History[1]  Surgical History[2]  Family History[3]  Social History[4]    Physical Exam   ED Triage Vitals [07/28/25 1146]   Temperature Heart Rate Respirations BP   36.7 °C (98.1 °F) 86 16 126/72      Pulse Ox Temp Source Heart Rate Source Patient Position   (!) 93 % Temporal Monitor Sitting      BP Location FiO2 (%)     Right arm --       Physical Exam  GENERAL APPEARANCE: Awake and alert. No acute respiratory distress.   VITAL SIGNS: As per the nurses' triage record.  HEENT: Normocephalic, atraumatic  NECK: Soft, nontender and supple  CHEST: Nontender to palpation. Clear to auscultation bilaterally. No rales, rhonchi, or wheezing. Symmetric rise and fall of chest wall.   HEART: Clear S1 and S2. Regular rate and rhythm. Strong and equal pulses in the extremities.  ABDOMEN: Soft, nontender, nondistended  MUSCULOSKELETAL: Full gross active range of motion of the left shoulder, left elbow and left wrist without any pain.  2+ radial pulses bilaterally.  Sensations  intact over median, radial and ulnar nerve distributions of left arm.  There is some tenderness to palpation over the biceps muscle.  There is no overlying redness, warmth, swelling or ecchymosis to the left arm.  Compartments are soft.  No bulging of any vessels of the neck on the left side.. Ambulating on own with no acute difficulties, no palpable lymphadenopathy in the left axillary region that I can appreciate.  NEUROLOGICAL: Awake, alert and oriented x 3. Motor power intact in the upper and lower extremities. Sensation is intact to light touch in the upper and lower extremities. Patient answering questions appropriately.   IMMUNOLOGICAL: No lymphatic streaking noted  DERMATOLOGIC: Warm and dry without petechiae, rashes, or ecchymosis noted on visible skin.   PYSCH: Cooperative with appropriate mood and affect.    ED Course & MDM   ED Course as of 07/30/25 1744   Mon Jul 28, 2025   1541 EKG personally interpreted by me performed at 1416  Sinus bradycardia with left axis deviation ventricular rate 54 no acute ischemic changes. [EF]      ED Course User Index  [EF] Padmini Bergman,          Diagnoses as of 07/30/25 1744   Left arm swelling                 No data recorded                                 Medical Decision Making  Parts of this chart have been completed using voice recognition software. Please excuse any errors of transcription.  My thought process and reason for plan has been formulated from the time that I saw the patient until the time of disposition and is not specific to one specific moment during their visit and furthermore my MDM encompasses this entire chart and not only this text box.      HPI: Detailed above.    Exam: A medically appropriate exam performed, outlined above, given the known history and presentation.    History obtained from: Patient    EKG: See my supervising physician's EKG interpretation      Medications given during visit:  Medications - No data to display      Diagnostic/tests  Labs Reviewed - No data to display   Vascular US upper extremity venous duplex left   Final Result   Negative study.  No thrombus in the central veins of the  left upper   extremities.        MACRO:   None        Signed by: Leonardo Baez 7/28/2025 2:18 PM   Dictation workstation:   [id number]           Considerations/further MDM:    Patient presents for history of swelling to the left arm compared to the right.  Patient presents today without the arm swelling however it was present over the weekend.  Differential diagnosis includes was not limited to DVT versus lymphadenopathy versus STEMI.  I did obtain ECG.  Patient has no pain in the chest.  ECG shows no evidence of STEMI.  The ultrasound of the left upper extremity shows no evidence of thrombus.  Patient swelling has since resolved, low suspicion for cardiac etiology.  Higher suspicion that this swelling could have been secondary to lymphadenopathy.  She was educated to follow-up closely with her primary care physician tomorrow on 7/29/2025 as scheduled.  Return precautions were discussed.  She verbalized understanding and was discharged in stable condition.    Procedure  Procedures         [1]   Past Medical History:  Diagnosis Date    Breast cancer     Cancer (Multi)     Deficiency of other specified B group vitamins     Folate deficiency    Myocardial infarction (Multi)     Personal history of diseases of the blood and blood-forming organs and certain disorders involving the immune mechanism     History of macrocytosis    Personal history of other diseases of the circulatory system     History of hypertension    Personal history of other diseases of the nervous system and sense organs     History of peripheral neuropathy    Personal history of other endocrine, nutritional and metabolic disease     History of hypokalemia    Personal history of transient ischemic attack (TIA), and cerebral infarction without residual deficits     History  of stroke    Sleep apnea     Stroke (Multi)    [2]   Past Surgical History:  Procedure Laterality Date    BI STEREOTACTIC GUIDED BREAST LEFT LOCALIZATION AND BIOPSY Left 03/28/2022    BI STEREOTACTIC GUIDED BREAST LOCALIZATION AND BIOPSY LEFT [facility]    BI US GUIDED BREAST LOCALIZATION AND BIOPSY RIGHT Right 03/28/2022    BI US GUIDED BREAST LOCALIZATION AND BIOPSY RIGHT [facility]    BI US GUIDED BREAST LOCALIZATION RIGHT Right 09/26/2022    BI US GUIDED BREAST LOCALIZATION RIGHT [facility]    BREAST SURGERY      CARDIAC CATHETERIZATION N/A 05/22/2025    Procedure: LHC, No LV;  Surgeon: Maribel Bateman MD;  Location: [facility] Cardiac Cath Lab;  Service: Cardiovascular;  Laterality: N/A;    CARDIAC CATHETERIZATION N/A 05/22/2025    Procedure: PCI;  Surgeon: Maribel Bateman MD;  Location: [facility] Cardiac Cath Lab;  Service: Cardiovascular;  Laterality: N/A;    EYE SURGERY      HYSTERECTOMY      LYMPH NODE BIOPSY      MASTECTOMY      MR HEAD ANGIO WO IV CONTRAST  10/26/2021    MR HEAD ANGIO WO IV CONTRAST [facility]    OTHER SURGICAL HISTORY  01/04/2022    Cataract surgery    OTHER SURGICAL HISTORY  01/04/2022    Hysterectomy    TUBAL LIGATION      US GUIDED BIOPSY LYMPH NODE SUPERFICIAL  03/28/2022    US GUIDED BIOPSY LYMPH NODE SUPERFICIAL [facility]   [3]   Family History  Problem Relation Name Age of Onset    Cancer Sister La     Breast cancer Sister La    [4]   Social History  Tobacco Use    Smoking status: Every Day     Current packs/day: 0.25     Average packs/day: 1.5 packs/day for 40.6 years (60.6 ttl pk-yrs)     Types: Cigarettes     Start date: 1/1/1985     Passive exposure: Never    Smokeless tobacco: Never   Vaping Use    Vaping status: Never Used   Substance Use Topics    Alcohol use: Yes     Alcohol/week: 7.0 standard drinks of alcohol     Types: 7 Cans of beer per week    Drug use: Never        Amber Webb PA-C  07/30/25 1743

## 2025-07-29 ENCOUNTER — APPOINTMENT (OUTPATIENT)
Dept: PRIMARY CARE | Facility: CLINIC | Age: 65
End: 2025-07-29
Payer: MEDICARE

## 2025-07-29 DIAGNOSIS — F17.200 HIGH DEPENDENCE ON SMOKING: ICD-10-CM

## 2025-07-29 DIAGNOSIS — E78.2 MIXED HYPERLIPIDEMIA: ICD-10-CM

## 2025-07-29 DIAGNOSIS — I50.9 HEART FAILURE, UNSPECIFIED HF CHRONICITY, UNSPECIFIED HEART FAILURE TYPE: ICD-10-CM

## 2025-07-29 DIAGNOSIS — M79.89 LEG SWELLING: ICD-10-CM

## 2025-07-29 DIAGNOSIS — Z12.31 ENCOUNTER FOR SCREENING MAMMOGRAM FOR MALIGNANT NEOPLASM OF BREAST: ICD-10-CM

## 2025-07-29 DIAGNOSIS — M54.42 CHRONIC LEFT-SIDED LOW BACK PAIN WITH LEFT-SIDED SCIATICA: ICD-10-CM

## 2025-07-29 DIAGNOSIS — G89.29 CHRONIC LEFT-SIDED LOW BACK PAIN WITH LEFT-SIDED SCIATICA: ICD-10-CM

## 2025-07-29 DIAGNOSIS — M79.89 ARM SWELLING: Primary | ICD-10-CM

## 2025-07-29 DIAGNOSIS — I25.2 HISTORY OF MI (MYOCARDIAL INFARCTION): ICD-10-CM

## 2025-07-29 DIAGNOSIS — Z71.85 IMMUNIZATION COUNSELING: ICD-10-CM

## 2025-07-29 DIAGNOSIS — Z85.3 HISTORY OF BREAST CANCER: ICD-10-CM

## 2025-07-29 DIAGNOSIS — R60.0 BILATERAL LEG EDEMA: ICD-10-CM

## 2025-07-29 LAB
ATRIAL RATE: 54 BPM
P AXIS: 51 DEGREES
P OFFSET: 191 MS
P ONSET: 141 MS
PR INTERVAL: 168 MS
Q ONSET: 225 MS
QRS COUNT: 9 BEATS
QRS DURATION: 72 MS
QT INTERVAL: 426 MS
QTC CALCULATION(BAZETT): 403 MS
QTC FREDERICIA: 411 MS
R AXIS: -33 DEGREES
T AXIS: 52 DEGREES
T OFFSET: 438 MS
VENTRICULAR RATE: 54 BPM

## 2025-07-30 ENCOUNTER — CLINICAL SUPPORT (OUTPATIENT)
Dept: CARDIAC REHAB | Facility: CLINIC | Age: 65
End: 2025-07-30
Payer: MEDICARE

## 2025-07-30 DIAGNOSIS — Z95.5 STENTED CORONARY ARTERY: ICD-10-CM

## 2025-07-30 DIAGNOSIS — I21.02 ST ELEVATION MYOCARDIAL INFARCTION INVOLVING LEFT ANTERIOR DESCENDING CORONARY ARTERY (MULTI): ICD-10-CM

## 2025-07-30 PROCEDURE — 93798 PHYS/QHP OP CAR RHAB W/ECG: CPT | Performed by: INTERNAL MEDICINE

## 2025-08-01 ENCOUNTER — CLINICAL SUPPORT (OUTPATIENT)
Dept: CARDIAC REHAB | Facility: CLINIC | Age: 65
End: 2025-08-01
Payer: MEDICARE

## 2025-08-01 DIAGNOSIS — I21.02 ST ELEVATION MYOCARDIAL INFARCTION INVOLVING LEFT ANTERIOR DESCENDING CORONARY ARTERY (MULTI): ICD-10-CM

## 2025-08-01 DIAGNOSIS — Z95.5 STENTED CORONARY ARTERY: ICD-10-CM

## 2025-08-01 PROCEDURE — 93798 PHYS/QHP OP CAR RHAB W/ECG: CPT | Performed by: INTERNAL MEDICINE

## 2025-08-04 ENCOUNTER — CLINICAL SUPPORT (OUTPATIENT)
Dept: CARDIAC REHAB | Facility: CLINIC | Age: 65
End: 2025-08-04
Payer: MEDICARE

## 2025-08-04 DIAGNOSIS — I21.02 ST ELEVATION MYOCARDIAL INFARCTION INVOLVING LEFT ANTERIOR DESCENDING CORONARY ARTERY (MULTI): ICD-10-CM

## 2025-08-04 DIAGNOSIS — Z95.5 STENTED CORONARY ARTERY: ICD-10-CM

## 2025-08-04 PROCEDURE — 93798 PHYS/QHP OP CAR RHAB W/ECG: CPT | Performed by: INTERNAL MEDICINE

## 2025-08-04 NOTE — PROGRESS NOTES
Cardiac Rehabilitation Individual Treatment Plan  60 Day Reassessment      Today's Date:   8/4/2025                       Program Location: [address], [place]    Name: Julia Carrion                            Medical Record Number: [id number]                          YOB: 1960    Referring Physician: Maribel Bateman MD  [address]  [address]  [place] [address] [phone]  Primary Care Physician: India Johnston MD PhD    Referring Diagnosis / Date of Diagnosis  STEMI 5/22/25  STENT 5/22/25      AACVPR Risk Stratification:  High    Learning Assessment:  Cardiac Knowledge Test:       Pre: 11/15                          Post:        /15  Learning assessment/barriers: Transportation, chronic pain - spouse will be driving pt  Preferred learning method: Auditory, Visual, Reading handout, and Writing handout      Education Classes: Schedule given with education manual.    Psychosocial REASSESSMENT    Psychosocial Assessment  Pt reported/currently experiencing stress : Yes, Stress and Depression. No dx of depression but low mood and tearful at intake  Current Stress Level (1-10 scale):  4/10 Patient has not be reevaluated for stress. On hold.   History of anxiety/depression: No  Currently seeing a mental health provider: No   Psychosocial Meds: No  Medication changes: No    Social Support: Yes, Whom:spouse, Joselito and adult children     Psychosocial Test:  PHQ-9  PHQ-9 score:   Pre:  7  Mid:     Post:     Interpretation:    5-9 PHQ-9 mild depression and Needs to complete     Psychosocial Plan   Goal Status:   In progress   Psychosocial Goals:  Attend Art Therapy, Attend Chair Yoga, Improve stress level, Participate in music therapy cooldown. Patient has been on hold for medical reasons.    Psychosocial Interventions/Education:  Family support, Staff and patient discussion      Nutrition REASSESSMENT    Nutrition Assessment  Picture Your Plate Score:  Pre: 45  Post:  Picture Your Plate  "Interpretation:   PYP 41-50 needs improvement for good healthy patterns   Special Diet:   none   Dietary Goal:  Mediterranean Diet;  PYP >60     Weight Management:   Weight:  165 lb   Height:  61 \"  BMI:  30  Weighing Daily: no - has home scale, given daily wt calendar and CHF zone chart  Goal Weight:   145 lbs    Heart Failure Assessment:   Most recent EF: 35%  Date: 5/22/25  Hx of Heart Failure: No    Heart Failure medications: Yes - Lasix    Heart Failure medication changes: No     Comments: pt plans to hold Lasix prior to exercise session    Nutrition Plan   Goal Status: In progress  Nutrition Goals:   BMI <30, develop heart healthy diet, daily weights, verbalize understanding of s/sx CHF    RD Recommendations:        Nutrition Interventions/Education: Picture Your Plate reviewed 7/21/25, CHF zone chart and daily wt log (date given 6/9/25), Encouraged dietician lecture attendance Returned diet survey.       Exercise REASSESSMENT    Exercise Assessment    Fall Risk Results:  medium  Initial Intake: Starting MET Level 2.4   Current MET level 2.4    Discharge MET level n/a    6-Min Walk Test:     PRE:     Feet:    990       METS:  2.4                 POST:  Feet:    n/a       METS:  n/a                    Current Home Exercise:   PT exercises only  Mode: n/a  Frequency (days/week):   n/a  Duration(mins/day):    n/a    Exercise Plan  Exercise Prescription based on 6MWT and/or evaluation   Frequency:   2-3   days/week   Duration:   40  minutes    Intensity: RPE (0-10 Dilma Scale):  2-4 (light to somewhat hard)  Target HR: rest + 30 bpm  Intensity: rest + 30  SpO2 Range   >88%, interval training prn    O2 Flow Rate  N/A   Progression:   Aim to progress 0.2- 0.3 METs/week or as tolerated  Mode:  Aerobic exercise  Initial MET level determined by the exercise physiologist from 1:1 evaluation and/or 6MWT, physician approved as documented.     Modality METS Load    Duration   1 Pre-Exercise/Rest      20:00   2 Treadmill N/a " N/a mph   N/a%   40:00   3 Sci RB 2.9 1.5 level   50-60RPM   40:00   4 NuStep 1.8 18 level   60-80SPM   40:00   5 Physiostepper 2.5 2 level   30-50SPM   40:00   6 Recumbent Bike 2.6 1 level   50-60RPM   40:00   7 Magnum UBE N/a 0 load   40-50RPM   40:00   8 Steps  2 inch    40:00   9 N/A      40:00   10 Final Cooldown      20:00     Resistance Training: no      Home Exercise Prescription/Self Report Log given: no     Goal Status: In progress  Exercise Goals: Improve 6MWT by 10%, Increase 30-40% in functional capacity overall, Safe & comfortable with exercise equipment, Uses RPE Dilma Scale (0-10) & exercises at RPE of 2-4, Increase 10-20% in functional capacity by next reassessment  Exercise Interventions/Education: Practice the RPE Dilma Scale (0-10)    Other Core Components/Risk Factors REASSESSMENT    Other Core Components Assessment  MEDICATION Adherence:              Taking medications as prescribed: yes   Uses pill box/organizer: need to assess   Carries medication list: yes     HYPERTENSION  Management:  Hx of Hypertension: Yes   Resting BP: 122/62  Peak Exercise BP:   138/68  Current Medications: Yes - metoprolol    Hypertension medication changes: No     TOBACCO/SMOKING Cessation:  Tobacco Use:    YES, cigarettes one half pack per day  Date started:   1985  Date quit:    Quit Date set:   Medications:   Comment:  reduced from 2PPD- pt not ready to quit. Patient has not been to cardiac rehab for medical reasons. Has not been reassessed.     DIABETES Management:  Diabetes:  Yes borderline  Medication: No  Medication changes: N/A  Hemoglobin A1C:   Lab Results   Component Value Date    HGBA1C 5.8 (H) 05/23/2025                 Pt monitors BS at home:  No  Comment:  pre-diabetic, needs education. Has not be at cardiac rehab for medical reasons. Has not been reassessed.     HYPERLIPIDEMIA Management:      Lipids: see lab values below   Medications:  Yes - atorvastatin  Medication changes:   No  Lab Results  "  Component Value Date    CHOL 122 05/23/2025    CHOL 161 10/26/2021     Lab Results   Component Value Date    HDL 32.7 05/23/2025    HDL 42 (L) 10/26/2021     Lab Results   Component Value Date    LDLCALC 48 05/23/2025    LDLCALC 83 10/26/2021     Lab Results   Component Value Date    TRIG 205 (H) 05/23/2025    TRIG 182 (H) 10/26/2021           Other Core Components Plan  Goal Status: In progress  Other Core Component Goals:   Achieve & maintain a resting blood pressure less than 130/80  Gain knowledge of cardiac disease and lifestyle modifications by discharge  Compliant with medications & carries medication list  Cholesterol <180, HDL >45, LDL <70, Trig <150  Lifestyle modification for risk factor reduction, Tobacco free    Other Core Component Interventions/Education:   Staff:Patient Discussion Patient has home BP cuff. Will start daily weights at home.     Individual Patient Goals:  Increase strength & endurance  Aerobic exercise 3-6x per week  \"Live a little bit longer\", be able to attend family functions    General Comments:  Education class schedule given with manual by 1st session  Lecture handouts provided  Individual education & counseling  Patient has been absent from cardiac rehab for medical reasons. Will reassess patient when she returns.     Rehab Staff Signature: Jessica Reed RN       "

## 2025-08-06 ENCOUNTER — CLINICAL SUPPORT (OUTPATIENT)
Dept: CARDIAC REHAB | Facility: CLINIC | Age: 65
End: 2025-08-06
Payer: MEDICARE

## 2025-08-06 DIAGNOSIS — Z95.5 STENTED CORONARY ARTERY: ICD-10-CM

## 2025-08-06 DIAGNOSIS — I21.02 ST ELEVATION MYOCARDIAL INFARCTION INVOLVING LEFT ANTERIOR DESCENDING CORONARY ARTERY (MULTI): ICD-10-CM

## 2025-08-06 PROCEDURE — 93798 PHYS/QHP OP CAR RHAB W/ECG: CPT | Performed by: INTERNAL MEDICINE

## 2025-08-07 ENCOUNTER — HOSPITAL ENCOUNTER (OUTPATIENT)
Dept: RADIOLOGY | Facility: HOSPITAL | Age: 65
Discharge: HOME | End: 2025-08-07
Payer: MEDICARE

## 2025-08-07 ENCOUNTER — PHARMACY VISIT (OUTPATIENT)
Dept: PHARMACY | Facility: CLINIC | Age: 65
End: 2025-08-07
Payer: MEDICARE

## 2025-08-07 ENCOUNTER — OFFICE VISIT (OUTPATIENT)
Dept: PRIMARY CARE | Facility: CLINIC | Age: 65
End: 2025-08-07
Payer: MEDICARE

## 2025-08-07 VITALS
DIASTOLIC BLOOD PRESSURE: 68 MMHG | BODY MASS INDEX: 30 KG/M2 | WEIGHT: 163 LBS | OXYGEN SATURATION: 97 % | HEART RATE: 79 BPM | HEIGHT: 62 IN | SYSTOLIC BLOOD PRESSURE: 110 MMHG | TEMPERATURE: 97.9 F

## 2025-08-07 DIAGNOSIS — G89.29 CHRONIC LEFT-SIDED BACK PAIN, UNSPECIFIED BACK LOCATION: ICD-10-CM

## 2025-08-07 DIAGNOSIS — M79.89 LEFT ARM SWELLING: ICD-10-CM

## 2025-08-07 DIAGNOSIS — M54.9 CHRONIC LEFT-SIDED BACK PAIN, UNSPECIFIED BACK LOCATION: ICD-10-CM

## 2025-08-07 DIAGNOSIS — M79.89 LEFT ARM SWELLING: Primary | ICD-10-CM

## 2025-08-07 PROCEDURE — G2211 COMPLEX E/M VISIT ADD ON: HCPCS | Performed by: PHYSICIAN ASSISTANT

## 2025-08-07 PROCEDURE — 99214 OFFICE O/P EST MOD 30 MIN: CPT | Performed by: PHYSICIAN ASSISTANT

## 2025-08-07 PROCEDURE — RXMED WILLOW AMBULATORY MEDICATION CHARGE

## 2025-08-07 PROCEDURE — 71046 X-RAY EXAM CHEST 2 VIEWS: CPT

## 2025-08-07 PROCEDURE — 1125F AMNT PAIN NOTED PAIN PRSNT: CPT | Performed by: PHYSICIAN ASSISTANT

## 2025-08-07 PROCEDURE — 3008F BODY MASS INDEX DOCD: CPT | Performed by: PHYSICIAN ASSISTANT

## 2025-08-07 PROCEDURE — 1159F MED LIST DOCD IN RCRD: CPT | Performed by: PHYSICIAN ASSISTANT

## 2025-08-07 PROCEDURE — 71046 X-RAY EXAM CHEST 2 VIEWS: CPT | Performed by: RADIOLOGY

## 2025-08-07 RX ORDER — DULOXETIN HYDROCHLORIDE 20 MG/1
20 CAPSULE, DELAYED RELEASE ORAL DAILY
Qty: 30 CAPSULE | Refills: 1 | Status: SHIPPED | OUTPATIENT
Start: 2025-08-07 | End: 2026-08-07

## 2025-08-07 ASSESSMENT — ENCOUNTER SYMPTOMS
DEPRESSION: 0
JOINT SWELLING: 0
ALLERGIC/IMMUNOLOGIC NEGATIVE: 1
HEADACHES: 0
FACIAL ASYMMETRY: 0
OCCASIONAL FEELINGS OF UNSTEADINESS: 0
NUMBNESS: 0
LOSS OF SENSATION IN FEET: 0
TREMORS: 0
RESPIRATORY NEGATIVE: 1
HEMATOLOGIC/LYMPHATIC NEGATIVE: 1
LIGHT-HEADEDNESS: 0
ENDOCRINE NEGATIVE: 1
CONSTITUTIONAL NEGATIVE: 1
SPEECH DIFFICULTY: 0
CARDIOVASCULAR NEGATIVE: 1
BACK PAIN: 1
SEIZURES: 0
DIZZINESS: 0
NECK PAIN: 0
NECK STIFFNESS: 0
MYALGIAS: 0
EYES NEGATIVE: 1
WEAKNESS: 0
ARTHRALGIAS: 1
PSYCHIATRIC NEGATIVE: 1
GASTROINTESTINAL NEGATIVE: 1

## 2025-08-07 ASSESSMENT — PAIN SCALES - GENERAL: PAINLEVEL_OUTOF10: 6

## 2025-08-07 NOTE — PATIENT INSTRUCTIONS
Start cymbalta 20 mg/day.  Get bloodwork  Return to clinic in 3 -4 weeks    If symptoms severely worsen or you experience any new concerning symptoms, go to the nearest emergency room or call 911 as needed.

## 2025-08-07 NOTE — PROGRESS NOTES
Subjective     Patient ID: Julia Carrion is a 65 y.o. female who presents for Follow-up (Arm swelling LEFT).    HPI  Julia Carrion is seen for her chronic issues.      Left arm swelling  - X 3 to 4 weeks, recently advised to go to emergency department to rule have a blood clot ruled out.  Ultrasound of the left upper extremity revealed no abnormalities or blood clots.  Patient was discharged from the ER instructed follow-up with primary care.  She denies any pain associated with the swelling, notes the swelling to be mild in nature.  No recent injury.  - Discussed with patient this most likely inflammation of the lymphatic system.  Will check CBC/CRP/ESR.  She denies any new neck pain or neck stiffness, chest pain, shortness of breath, dyspnea exertion or palpitations.  She denies any infectious symptoms.    Chronic left-sided low to mid back pain  - Seen spine back in the past, no surgical intervention was recommended at the time.  Physical therapy has not had been helpful per patient.  She has a prescription for Percocet for severe pain however is afraid to use it if she has to go upstairs in her house.  Given her risk of falls, will refrain from gabapentin or Lyrica at this time.  Will try low-dose Cymbalta, will see the patient back in 3 to 4 weeks.  Can consider further follow-up with spine clinic as needed.    Review of Systems   Constitutional: Negative.    HENT: Negative.     Eyes: Negative.    Respiratory: Negative.     Cardiovascular: Negative.    Gastrointestinal: Negative.    Endocrine: Negative.    Genitourinary: Negative.    Musculoskeletal:  Positive for arthralgias, back pain (Chronic without acute change) and gait problem (Chronic without acute change). Negative for joint swelling, myalgias, neck pain and neck stiffness.   Skin: Negative.    Allergic/Immunologic: Negative.    Neurological:  Negative for dizziness, tremors, seizures, syncope, facial asymmetry, speech difficulty, weakness,  "light-headedness, numbness and headaches.   Hematological: Negative.    Psychiatric/Behavioral: Negative.         Objective  Vitals:  /68   Pulse 79   Temp 36.6 °C (97.9 °F)   Ht 1.575 m (5' 2\")   Wt 73.9 kg (163 lb)   LMP  (LMP Unknown)   SpO2 97%   BMI 29.81 kg/m²     Physical Exam  Vitals and nursing note reviewed.   Constitutional:       General: She is not in acute distress.     Appearance: Normal appearance. She is not ill-appearing, toxic-appearing or diaphoretic.   HENT:      Head: Normocephalic and atraumatic.      Right Ear: External ear normal. There is no impacted cerumen.      Left Ear: External ear normal. There is no impacted cerumen.      Nose: Nose normal. No congestion.      Mouth/Throat:      Mouth: Mucous membranes are moist.      Pharynx: No oropharyngeal exudate or posterior oropharyngeal erythema.     Eyes:      General:         Right eye: No discharge.         Left eye: No discharge.      Extraocular Movements: Extraocular movements intact.      Pupils: Pupils are equal, round, and reactive to light.     Neck:      Vascular: No carotid bruit.     Cardiovascular:      Rate and Rhythm: Normal rate and regular rhythm.      Pulses: Normal pulses.      Heart sounds: Normal heart sounds.   Pulmonary:      Effort: Pulmonary effort is normal. No respiratory distress.      Breath sounds: Normal breath sounds. No stridor. No wheezing, rhonchi or rales.   Chest:      Chest wall: No tenderness.   Abdominal:      General: There is no distension.      Palpations: Abdomen is soft. There is no mass.      Tenderness: There is no abdominal tenderness. There is no right CVA tenderness, left CVA tenderness, guarding or rebound.      Hernia: No hernia is present.     Musculoskeletal:         General: Swelling (Trace (barely noticible) swelling of L humerus compared to R humerus. No abnormal warmth. No abnormal rash or lymphangic streaking) present. No tenderness, deformity or signs of injury.      " Cervical back: Normal range of motion. No rigidity or tenderness.      Right lower leg: No edema.      Left lower leg: No edema.   Lymphadenopathy:      Cervical: No cervical adenopathy.     Skin:     General: Skin is warm.      Capillary Refill: Capillary refill takes less than 2 seconds.      Coloration: Skin is not jaundiced or pale.      Findings: No bruising, erythema, lesion or rash.     Neurological:      General: No focal deficit present.      Mental Status: She is alert and oriented to person, place, and time.      Cranial Nerves: No cranial nerve deficit.      Sensory: No sensory deficit.      Motor: No weakness.      Coordination: Coordination normal.      Gait: Gait abnormal (Walker-> Chronic).      Deep Tendon Reflexes: Reflexes normal.     Psychiatric:         Mood and Affect: Mood normal.         Behavior: Behavior normal.         Thought Content: Thought content normal.         Judgment: Judgment normal.         STUDY:  [facility] US UPPER EXTREMITY VENOUS DUPLEX LEFT  7/28/2025 1:54 pm      INDICATION:  66 y/o   F with  Signs/Symptoms:swelling and left upper arm pain.          COMPARISON:  None.      ACCESSION NUMBER(S):  [id number]      ORDERING CLINICIAN:  SHEYLA RAMOS      TECHNIQUE:  Routine ultrasound of the  left upper extremity was performed with  duplex Doppler (color and spectral) evaluation.   Static images were  obtained for remote interpretation.      FINDINGS:  UPPER EXTREMITY VEINS:  Examination was performed with color and  duplex Doppler.      The internal jugular, subclavian, and axillary veins are patent and  free of thrombus.  The visualized brachiocephalic veins are patent.  The brachial, basilic, and cephalic veins are patent and compressible.      IMPRESSION:  Negative study.  No thrombus in the central veins of the  left upper  extremities.      MACRO:  None      STUDY:  [facility] US UPPER EXTREMITY VENOUS DUPLEX LEFT  7/28/2025 1:54 pm      INDICATION:  66 y/o   F with   Signs/Symptoms:swelling and left upper arm pain.          COMPARISON:  None.      ACCESSION NUMBER(S):  [id number]      ORDERING CLINICIAN:  SHEYLA RAMOS      TECHNIQUE:  Routine ultrasound of the  left upper extremity was performed with  duplex Doppler (color and spectral) evaluation.   Static images were  obtained for remote interpretation.      FINDINGS:  UPPER EXTREMITY VEINS:  Examination was performed with color and  duplex Doppler.      The internal jugular, subclavian, and axillary veins are patent and  free of thrombus.  The visualized brachiocephalic veins are patent.  The brachial, basilic, and cephalic veins are patent and compressible.      IMPRESSION:  Negative study.  No thrombus in the central veins of the  left upper  extremities.      MACRO:  None         1. Left arm swelling  XR chest 2 views   Likely lymphatic related, however given abnormal CT in mid July will get new CXR. Low suspicion for clot given normal ultrasound. Will obtain CBC/CRP/ESR as well.     2. Chronic left-sided back pain, unspecified back location  DULoxetine (Cymbalta) 20 mg DR capsule    C-reactive protein   See HPI Sedimentation Rate    CBC and Auto Differential    C-reactive protein    Sedimentation Rate    CBC and Auto Differential    XR chest 2 views         Start cymbalta 20 mg/day.  Get bloodwork  Get CXR  Return to clinic in 3 -4 weeks    If symptoms severely worsen or you experience any new concerning symptoms, go to the nearest emergency room or call 911 as needed.    Orders Placed This Encounter   Procedures    C-reactive protein     Standing Status:   Future     Number of Occurrences:   1     Expected Date:   8/7/2025     Expiration Date:   8/7/2026     Release result to [employer]:   Immediate [1]    Sedimentation Rate     Standing Status:   Future     Number of Occurrences:   1     Expected Date:   8/7/2025     Expiration Date:   8/7/2026     Release result to [employer]:   Immediate [1]    CBC and Auto Differential      Standing Status:   Future     Number of Occurrences:   1     Expected Date:   8/7/2025     Expiration Date:   8/7/2026     Release result to [employer]:   Immediate [1]

## 2025-08-08 ENCOUNTER — CLINICAL SUPPORT (OUTPATIENT)
Dept: CARDIAC REHAB | Facility: CLINIC | Age: 65
End: 2025-08-08
Payer: MEDICARE

## 2025-08-08 DIAGNOSIS — I21.02 ST ELEVATION MYOCARDIAL INFARCTION INVOLVING LEFT ANTERIOR DESCENDING CORONARY ARTERY (MULTI): ICD-10-CM

## 2025-08-08 DIAGNOSIS — Z95.5 STENTED CORONARY ARTERY: ICD-10-CM

## 2025-08-08 LAB
BASOPHILS # BLD AUTO: 55 CELLS/UL (ref 0–200)
BASOPHILS NFR BLD AUTO: 0.6 %
CRP SERPL-MCNC: 5.8 MG/L
EOSINOPHIL # BLD AUTO: 191 CELLS/UL (ref 15–500)
EOSINOPHIL NFR BLD AUTO: 2.1 %
ERYTHROCYTE [DISTWIDTH] IN BLOOD BY AUTOMATED COUNT: 12.7 % (ref 11–15)
ERYTHROCYTE [SEDIMENTATION RATE] IN BLOOD BY WESTERGREN METHOD: 45 MM/H
HCT VFR BLD AUTO: 42 % (ref 35–45)
HGB BLD-MCNC: 14 G/DL (ref 11.7–15.5)
LYMPHOCYTES # BLD AUTO: 2311 CELLS/UL (ref 850–3900)
LYMPHOCYTES NFR BLD AUTO: 25.4 %
MCH RBC QN AUTO: 32 PG (ref 27–33)
MCHC RBC AUTO-ENTMCNC: 33.3 G/DL (ref 32–36)
MCV RBC AUTO: 95.9 FL (ref 80–100)
MONOCYTES # BLD AUTO: 619 CELLS/UL (ref 200–950)
MONOCYTES NFR BLD AUTO: 6.8 %
NEUTROPHILS # BLD AUTO: 5924 CELLS/UL (ref 1500–7800)
NEUTROPHILS NFR BLD AUTO: 65.1 %
PLATELET # BLD AUTO: 209 THOUSAND/UL (ref 140–400)
PMV BLD REES-ECKER: 10.9 FL (ref 7.5–12.5)
RBC # BLD AUTO: 4.38 MILLION/UL (ref 3.8–5.1)
WBC # BLD AUTO: 9.1 THOUSAND/UL (ref 3.8–10.8)

## 2025-08-08 PROCEDURE — 93798 PHYS/QHP OP CAR RHAB W/ECG: CPT | Performed by: INTERNAL MEDICINE

## 2025-08-09 PROCEDURE — RXMED WILLOW AMBULATORY MEDICATION CHARGE

## 2025-08-11 ENCOUNTER — APPOINTMENT (OUTPATIENT)
Facility: CLINIC | Age: 65
End: 2025-08-11
Payer: MEDICARE

## 2025-08-11 ENCOUNTER — CLINICAL SUPPORT (OUTPATIENT)
Dept: CARDIAC REHAB | Facility: CLINIC | Age: 65
End: 2025-08-11
Payer: MEDICARE

## 2025-08-11 DIAGNOSIS — I21.02 ST ELEVATION MYOCARDIAL INFARCTION INVOLVING LEFT ANTERIOR DESCENDING CORONARY ARTERY (MULTI): ICD-10-CM

## 2025-08-11 DIAGNOSIS — Z95.5 STENTED CORONARY ARTERY: ICD-10-CM

## 2025-08-11 PROCEDURE — 93798 PHYS/QHP OP CAR RHAB W/ECG: CPT | Performed by: INTERNAL MEDICINE

## 2025-08-13 ENCOUNTER — CLINICAL SUPPORT (OUTPATIENT)
Dept: CARDIAC REHAB | Facility: CLINIC | Age: 65
End: 2025-08-13
Payer: MEDICARE

## 2025-08-13 DIAGNOSIS — Z95.5 STENTED CORONARY ARTERY: ICD-10-CM

## 2025-08-13 DIAGNOSIS — I21.02 ST ELEVATION MYOCARDIAL INFARCTION INVOLVING LEFT ANTERIOR DESCENDING CORONARY ARTERY (MULTI): ICD-10-CM

## 2025-08-13 PROCEDURE — 93798 PHYS/QHP OP CAR RHAB W/ECG: CPT | Performed by: INTERNAL MEDICINE

## 2025-08-14 ENCOUNTER — PHARMACY VISIT (OUTPATIENT)
Dept: PHARMACY | Facility: CLINIC | Age: 65
End: 2025-08-14
Payer: MEDICARE

## 2025-08-15 ENCOUNTER — CLINICAL SUPPORT (OUTPATIENT)
Dept: CARDIAC REHAB | Facility: CLINIC | Age: 65
End: 2025-08-15
Payer: MEDICARE

## 2025-08-15 DIAGNOSIS — Z95.5 STENTED CORONARY ARTERY: ICD-10-CM

## 2025-08-15 DIAGNOSIS — I21.02 ST ELEVATION MYOCARDIAL INFARCTION INVOLVING LEFT ANTERIOR DESCENDING CORONARY ARTERY (MULTI): ICD-10-CM

## 2025-08-15 PROCEDURE — 93798 PHYS/QHP OP CAR RHAB W/ECG: CPT | Performed by: INTERNAL MEDICINE

## 2025-08-18 ENCOUNTER — CLINICAL SUPPORT (OUTPATIENT)
Dept: CARDIAC REHAB | Facility: CLINIC | Age: 65
End: 2025-08-18
Payer: MEDICARE

## 2025-08-18 DIAGNOSIS — Z95.5 STENTED CORONARY ARTERY: ICD-10-CM

## 2025-08-18 DIAGNOSIS — I25.5 ISCHEMIC CARDIOMYOPATHY: ICD-10-CM

## 2025-08-18 DIAGNOSIS — I21.02 ST ELEVATION MYOCARDIAL INFARCTION INVOLVING LEFT ANTERIOR DESCENDING CORONARY ARTERY (MULTI): ICD-10-CM

## 2025-08-18 PROCEDURE — RXMED WILLOW AMBULATORY MEDICATION CHARGE

## 2025-08-18 PROCEDURE — 93798 PHYS/QHP OP CAR RHAB W/ECG: CPT | Performed by: INTERNAL MEDICINE

## 2025-08-18 RX ORDER — METOPROLOL SUCCINATE 50 MG/1
50 TABLET, EXTENDED RELEASE ORAL DAILY
Qty: 90 TABLET | Refills: 3 | Status: SHIPPED | OUTPATIENT
Start: 2025-08-18 | End: 2026-08-18

## 2025-08-20 ENCOUNTER — CLINICAL SUPPORT (OUTPATIENT)
Dept: CARDIAC REHAB | Facility: CLINIC | Age: 65
End: 2025-08-20
Payer: MEDICARE

## 2025-08-20 DIAGNOSIS — Z95.5 STENTED CORONARY ARTERY: ICD-10-CM

## 2025-08-20 DIAGNOSIS — I21.02 ST ELEVATION MYOCARDIAL INFARCTION INVOLVING LEFT ANTERIOR DESCENDING CORONARY ARTERY (MULTI): ICD-10-CM

## 2025-08-20 PROCEDURE — 93798 PHYS/QHP OP CAR RHAB W/ECG: CPT | Performed by: INTERNAL MEDICINE

## 2025-08-21 ENCOUNTER — PHARMACY VISIT (OUTPATIENT)
Dept: PHARMACY | Facility: CLINIC | Age: 65
End: 2025-08-21
Payer: MEDICARE

## 2025-08-22 ENCOUNTER — CLINICAL SUPPORT (OUTPATIENT)
Dept: CARDIAC REHAB | Facility: CLINIC | Age: 65
End: 2025-08-22
Payer: MEDICARE

## 2025-08-22 DIAGNOSIS — G47.00 INSOMNIA, UNSPECIFIED TYPE: ICD-10-CM

## 2025-08-22 DIAGNOSIS — R60.0 BILATERAL LEG EDEMA: Primary | ICD-10-CM

## 2025-08-22 DIAGNOSIS — E78.5 HYPERLIPIDEMIA, UNSPECIFIED HYPERLIPIDEMIA TYPE: ICD-10-CM

## 2025-08-22 DIAGNOSIS — I21.02 ST ELEVATION MYOCARDIAL INFARCTION INVOLVING LEFT ANTERIOR DESCENDING CORONARY ARTERY (MULTI): ICD-10-CM

## 2025-08-22 DIAGNOSIS — Z95.5 STENTED CORONARY ARTERY: ICD-10-CM

## 2025-08-22 DIAGNOSIS — I50.21 ACUTE SYSTOLIC HEART FAILURE: ICD-10-CM

## 2025-08-22 PROCEDURE — 93798 PHYS/QHP OP CAR RHAB W/ECG: CPT | Performed by: INTERNAL MEDICINE

## 2025-08-22 RX ORDER — ATORVASTATIN CALCIUM 40 MG/1
40 TABLET, FILM COATED ORAL DAILY
Qty: 90 TABLET | Refills: 1 | Status: SHIPPED | OUTPATIENT
Start: 2025-08-22

## 2025-08-22 RX ORDER — FUROSEMIDE 20 MG/1
20 TABLET ORAL
Qty: 180 TABLET | Refills: 1 | Status: SHIPPED | OUTPATIENT
Start: 2025-08-22 | End: 2025-11-20

## 2025-08-22 RX ORDER — TRAZODONE HYDROCHLORIDE 100 MG/1
100 TABLET ORAL NIGHTLY
Qty: 90 TABLET | Refills: 1 | Status: SHIPPED | OUTPATIENT
Start: 2025-08-22

## 2025-08-25 ENCOUNTER — CLINICAL SUPPORT (OUTPATIENT)
Dept: CARDIAC REHAB | Facility: CLINIC | Age: 65
End: 2025-08-25
Payer: MEDICARE

## 2025-08-25 DIAGNOSIS — Z95.5 STENTED CORONARY ARTERY: ICD-10-CM

## 2025-08-25 DIAGNOSIS — I21.02 ST ELEVATION MYOCARDIAL INFARCTION INVOLVING LEFT ANTERIOR DESCENDING CORONARY ARTERY (MULTI): ICD-10-CM

## 2025-08-25 PROCEDURE — 93798 PHYS/QHP OP CAR RHAB W/ECG: CPT | Performed by: INTERNAL MEDICINE

## 2025-08-27 ENCOUNTER — CLINICAL SUPPORT (OUTPATIENT)
Dept: CARDIAC REHAB | Facility: CLINIC | Age: 65
End: 2025-08-27
Payer: MEDICARE

## 2025-08-27 DIAGNOSIS — I21.02 ST ELEVATION MYOCARDIAL INFARCTION INVOLVING LEFT ANTERIOR DESCENDING CORONARY ARTERY (MULTI): ICD-10-CM

## 2025-08-27 DIAGNOSIS — Z95.5 STENTED CORONARY ARTERY: ICD-10-CM

## 2025-08-27 PROCEDURE — 93798 PHYS/QHP OP CAR RHAB W/ECG: CPT | Performed by: INTERNAL MEDICINE

## 2025-08-29 ENCOUNTER — CLINICAL SUPPORT (OUTPATIENT)
Dept: CARDIAC REHAB | Facility: CLINIC | Age: 65
End: 2025-08-29
Payer: MEDICARE

## 2025-08-29 DIAGNOSIS — Z95.5 STENTED CORONARY ARTERY: ICD-10-CM

## 2025-08-29 DIAGNOSIS — I21.02 ST ELEVATION MYOCARDIAL INFARCTION INVOLVING LEFT ANTERIOR DESCENDING CORONARY ARTERY (MULTI): ICD-10-CM

## 2025-08-29 PROCEDURE — 93798 PHYS/QHP OP CAR RHAB W/ECG: CPT | Performed by: INTERNAL MEDICINE

## 2025-08-30 PROCEDURE — RXMED WILLOW AMBULATORY MEDICATION CHARGE

## 2025-09-03 ENCOUNTER — APPOINTMENT (OUTPATIENT)
Dept: PRIMARY CARE | Facility: CLINIC | Age: 65
End: 2025-09-03
Payer: MEDICARE

## 2025-09-03 ENCOUNTER — PHARMACY VISIT (OUTPATIENT)
Dept: PHARMACY | Facility: CLINIC | Age: 65
End: 2025-09-03
Payer: MEDICARE

## 2025-09-03 ENCOUNTER — CLINICAL SUPPORT (OUTPATIENT)
Dept: CARDIAC REHAB | Facility: CLINIC | Age: 65
End: 2025-09-03
Payer: MEDICARE

## 2025-09-03 DIAGNOSIS — Z95.5 STENTED CORONARY ARTERY: ICD-10-CM

## 2025-09-03 DIAGNOSIS — I21.02 ST ELEVATION MYOCARDIAL INFARCTION INVOLVING LEFT ANTERIOR DESCENDING CORONARY ARTERY (MULTI): ICD-10-CM

## 2025-09-03 PROCEDURE — 93798 PHYS/QHP OP CAR RHAB W/ECG: CPT | Performed by: INTERNAL MEDICINE

## 2025-09-04 ENCOUNTER — APPOINTMENT (OUTPATIENT)
Dept: PRIMARY CARE | Facility: CLINIC | Age: 65
End: 2025-09-04
Payer: MEDICARE

## 2025-09-05 ENCOUNTER — CLINICAL SUPPORT (OUTPATIENT)
Dept: CARDIAC REHAB | Facility: CLINIC | Age: 65
End: 2025-09-05
Payer: MEDICARE

## 2025-09-05 DIAGNOSIS — Z95.5 STENTED CORONARY ARTERY: ICD-10-CM

## 2025-09-05 DIAGNOSIS — I21.02 ST ELEVATION MYOCARDIAL INFARCTION INVOLVING LEFT ANTERIOR DESCENDING CORONARY ARTERY (MULTI): ICD-10-CM

## 2025-09-05 PROCEDURE — 93798 PHYS/QHP OP CAR RHAB W/ECG: CPT | Performed by: INTERNAL MEDICINE

## 2025-09-08 ENCOUNTER — APPOINTMENT (OUTPATIENT)
Dept: CARDIAC REHAB | Facility: CLINIC | Age: 65
End: 2025-09-08
Payer: MEDICARE

## 2025-09-10 ENCOUNTER — APPOINTMENT (OUTPATIENT)
Dept: CARDIAC REHAB | Facility: CLINIC | Age: 65
End: 2025-09-10
Payer: MEDICARE

## 2025-09-12 ENCOUNTER — APPOINTMENT (OUTPATIENT)
Dept: CARDIAC REHAB | Facility: CLINIC | Age: 65
End: 2025-09-12
Payer: MEDICARE

## 2025-09-15 ENCOUNTER — APPOINTMENT (OUTPATIENT)
Dept: CARDIAC REHAB | Facility: CLINIC | Age: 65
End: 2025-09-15
Payer: MEDICARE

## 2025-09-17 ENCOUNTER — APPOINTMENT (OUTPATIENT)
Dept: CARDIAC REHAB | Facility: CLINIC | Age: 65
End: 2025-09-17
Payer: MEDICARE

## 2025-09-19 ENCOUNTER — APPOINTMENT (OUTPATIENT)
Dept: CARDIAC REHAB | Facility: CLINIC | Age: 65
End: 2025-09-19
Payer: MEDICARE

## 2025-09-22 ENCOUNTER — APPOINTMENT (OUTPATIENT)
Dept: CARDIAC REHAB | Facility: CLINIC | Age: 65
End: 2025-09-22
Payer: MEDICARE

## 2025-09-24 ENCOUNTER — APPOINTMENT (OUTPATIENT)
Facility: CLINIC | Age: 65
End: 2025-09-24
Payer: MEDICARE

## 2025-09-24 ENCOUNTER — APPOINTMENT (OUTPATIENT)
Dept: CARDIAC REHAB | Facility: CLINIC | Age: 65
End: 2025-09-24
Payer: MEDICARE

## 2025-09-26 ENCOUNTER — APPOINTMENT (OUTPATIENT)
Dept: CARDIAC REHAB | Facility: CLINIC | Age: 65
End: 2025-09-26
Payer: MEDICARE

## 2025-09-29 ENCOUNTER — APPOINTMENT (OUTPATIENT)
Dept: CARDIAC REHAB | Facility: CLINIC | Age: 65
End: 2025-09-29
Payer: MEDICARE

## 2025-09-29 ENCOUNTER — APPOINTMENT (OUTPATIENT)
Dept: HEMATOLOGY/ONCOLOGY | Facility: CLINIC | Age: 65
End: 2025-09-29
Payer: MEDICARE

## 2025-10-01 ENCOUNTER — APPOINTMENT (OUTPATIENT)
Dept: CARDIAC REHAB | Facility: CLINIC | Age: 65
End: 2025-10-01
Payer: MEDICARE

## 2025-10-03 ENCOUNTER — APPOINTMENT (OUTPATIENT)
Dept: CARDIAC REHAB | Facility: CLINIC | Age: 65
End: 2025-10-03
Payer: MEDICARE

## 2025-10-06 ENCOUNTER — APPOINTMENT (OUTPATIENT)
Dept: CARDIAC REHAB | Facility: CLINIC | Age: 65
End: 2025-10-06
Payer: MEDICARE

## (undated) DEVICE — TR BAND, RADIAL COMPRESSION, LONG, 29CM

## (undated) DEVICE — INFLATION KIT, ADVANTAGE, ENCORE 26 (1/BOX)

## (undated) DEVICE — Device

## (undated) DEVICE — GUIDEWIRE, RUN THROUGH WIRE, 180CM

## (undated) DEVICE — CATHETER, ANGIO, IMPULSE, PIG 155 DEG, 5 FR X 110 CM

## (undated) DEVICE — GUIDEWIRE, J TIP, 3 MM, 0.035 IN X 260 CM, PTFE

## (undated) DEVICE — CATHETER, BALLOON DILATION, EUPHORA SEMICOMPLIANT 2.50  X 12 MM X 142CM

## (undated) DEVICE — KIT, MANIFOLD NAMIC, STANDARD, LHK,PRM,3V,ON,DT,PS,CMSQ,S10

## (undated) DEVICE — CATHETER, OPTITORQUE, 5FR, TIG, 1H/100CM

## (undated) DEVICE — PACK, ANGIO P2, CUSTOM, LAKE

## (undated) DEVICE — INTRODUCER SHEATH, GLIDESHEATH, 6FR 25CM

## (undated) DEVICE — CATHETER, BALLOON, NC EUPHORA NONCOMPLIANT 3.0 X 12 X 142CM